# Patient Record
Sex: MALE | Race: WHITE | NOT HISPANIC OR LATINO
[De-identification: names, ages, dates, MRNs, and addresses within clinical notes are randomized per-mention and may not be internally consistent; named-entity substitution may affect disease eponyms.]

---

## 2017-05-16 ENCOUNTER — APPOINTMENT (OUTPATIENT)
Dept: NEPHROLOGY | Facility: CLINIC | Age: 72
End: 2017-05-16

## 2017-05-16 ENCOUNTER — APPOINTMENT (OUTPATIENT)
Dept: TRANSPLANT | Facility: CLINIC | Age: 72
End: 2017-05-16

## 2017-05-16 VITALS
HEIGHT: 71 IN | BODY MASS INDEX: 29.4 KG/M2 | SYSTOLIC BLOOD PRESSURE: 120 MMHG | WEIGHT: 210 LBS | OXYGEN SATURATION: 98 % | TEMPERATURE: 97.6 F | HEART RATE: 72 BPM | DIASTOLIC BLOOD PRESSURE: 78 MMHG | RESPIRATION RATE: 17 BRPM

## 2017-05-16 DIAGNOSIS — N20.0 CALCULUS OF KIDNEY: ICD-10-CM

## 2017-05-16 DIAGNOSIS — Z83.1 FAMILY HISTORY OF OTHER INFECTIOUS AND PARASITIC DISEASES: ICD-10-CM

## 2017-05-16 DIAGNOSIS — Z86.39 PERSONAL HISTORY OF OTHER ENDOCRINE, NUTRITIONAL AND METABOLIC DISEASE: ICD-10-CM

## 2017-05-16 DIAGNOSIS — Z98.890 OTHER SPECIFIED POSTPROCEDURAL STATES: ICD-10-CM

## 2017-05-16 DIAGNOSIS — Z87.19 PERSONAL HISTORY OF OTHER DISEASES OF THE DIGESTIVE SYSTEM: ICD-10-CM

## 2017-05-16 DIAGNOSIS — N18.9 CHRONIC KIDNEY DISEASE, UNSPECIFIED: ICD-10-CM

## 2017-05-16 DIAGNOSIS — Z90.49 ACQUIRED ABSENCE OF OTHER SPECIFIED PARTS OF DIGESTIVE TRACT: ICD-10-CM

## 2017-05-16 DIAGNOSIS — Z82.49 FAMILY HISTORY OF ISCHEMIC HEART DISEASE AND OTHER DISEASES OF THE CIRCULATORY SYSTEM: ICD-10-CM

## 2017-05-19 ENCOUNTER — APPOINTMENT (OUTPATIENT)
Dept: TRANSPLANT | Facility: CLINIC | Age: 72
End: 2017-05-19

## 2017-05-29 ENCOUNTER — FORM ENCOUNTER (OUTPATIENT)
Age: 72
End: 2017-05-29

## 2017-05-30 ENCOUNTER — APPOINTMENT (OUTPATIENT)
Dept: RADIOLOGY | Facility: CLINIC | Age: 72
End: 2017-05-30

## 2017-05-30 ENCOUNTER — OUTPATIENT (OUTPATIENT)
Dept: OUTPATIENT SERVICES | Facility: HOSPITAL | Age: 72
LOS: 1 days | End: 2017-05-30
Payer: COMMERCIAL

## 2017-05-30 ENCOUNTER — APPOINTMENT (OUTPATIENT)
Dept: ULTRASOUND IMAGING | Facility: CLINIC | Age: 72
End: 2017-05-30

## 2017-05-30 DIAGNOSIS — Z00.8 ENCOUNTER FOR OTHER GENERAL EXAMINATION: ICD-10-CM

## 2017-05-30 DIAGNOSIS — Z01.818 ENCOUNTER FOR OTHER PREPROCEDURAL EXAMINATION: ICD-10-CM

## 2017-05-30 PROCEDURE — 93975 VASCULAR STUDY: CPT

## 2017-05-30 PROCEDURE — 71046 X-RAY EXAM CHEST 2 VIEWS: CPT

## 2017-06-08 ENCOUNTER — NON-APPOINTMENT (OUTPATIENT)
Age: 72
End: 2017-06-08

## 2017-06-08 ENCOUNTER — APPOINTMENT (OUTPATIENT)
Dept: CARDIOLOGY | Facility: CLINIC | Age: 72
End: 2017-06-08

## 2017-06-08 VITALS
HEART RATE: 66 BPM | SYSTOLIC BLOOD PRESSURE: 119 MMHG | WEIGHT: 212 LBS | DIASTOLIC BLOOD PRESSURE: 82 MMHG | OXYGEN SATURATION: 96 % | BODY MASS INDEX: 29.68 KG/M2 | HEIGHT: 71 IN

## 2017-06-08 DIAGNOSIS — H35.30 UNSPECIFIED MACULAR DEGENERATION: ICD-10-CM

## 2017-06-08 DIAGNOSIS — Z87.891 PERSONAL HISTORY OF NICOTINE DEPENDENCE: ICD-10-CM

## 2017-06-21 ENCOUNTER — OUTPATIENT (OUTPATIENT)
Dept: OUTPATIENT SERVICES | Facility: HOSPITAL | Age: 72
LOS: 1 days | End: 2017-06-21
Payer: COMMERCIAL

## 2017-06-21 DIAGNOSIS — I10 ESSENTIAL (PRIMARY) HYPERTENSION: ICD-10-CM

## 2017-06-21 PROCEDURE — 93018 CV STRESS TEST I&R ONLY: CPT

## 2017-06-21 PROCEDURE — 93306 TTE W/DOPPLER COMPLETE: CPT | Mod: 26

## 2017-06-21 PROCEDURE — 93017 CV STRESS TEST TRACING ONLY: CPT

## 2017-06-21 PROCEDURE — 93016 CV STRESS TEST SUPVJ ONLY: CPT

## 2017-06-21 PROCEDURE — A9500: CPT

## 2017-06-21 PROCEDURE — 78452 HT MUSCLE IMAGE SPECT MULT: CPT

## 2017-06-21 PROCEDURE — 93306 TTE W/DOPPLER COMPLETE: CPT

## 2017-06-21 PROCEDURE — 78452 HT MUSCLE IMAGE SPECT MULT: CPT | Mod: 26

## 2017-08-08 ENCOUNTER — APPOINTMENT (OUTPATIENT)
Dept: NEPHROLOGY | Facility: CLINIC | Age: 72
End: 2017-08-08

## 2017-08-24 ENCOUNTER — OTHER (OUTPATIENT)
Age: 72
End: 2017-08-24

## 2017-09-25 ENCOUNTER — CHART COPY (OUTPATIENT)
Age: 72
End: 2017-09-25

## 2017-11-09 ENCOUNTER — CHART COPY (OUTPATIENT)
Age: 72
End: 2017-11-09

## 2018-01-02 ENCOUNTER — APPOINTMENT (OUTPATIENT)
Dept: TRANSPLANT | Facility: CLINIC | Age: 73
End: 2018-01-02
Payer: COMMERCIAL

## 2018-01-02 VITALS
HEART RATE: 79 BPM | DIASTOLIC BLOOD PRESSURE: 72 MMHG | TEMPERATURE: 97.5 F | SYSTOLIC BLOOD PRESSURE: 113 MMHG | WEIGHT: 220 LBS | BODY MASS INDEX: 30.8 KG/M2 | OXYGEN SATURATION: 96 % | HEIGHT: 71 IN | RESPIRATION RATE: 17 BRPM

## 2018-01-02 PROCEDURE — XXXXX: CPT

## 2018-11-06 ENCOUNTER — APPOINTMENT (OUTPATIENT)
Dept: TRANSPLANT | Facility: CLINIC | Age: 73
End: 2018-11-06

## 2019-01-22 ENCOUNTER — APPOINTMENT (OUTPATIENT)
Dept: TRANSPLANT | Facility: CLINIC | Age: 74
End: 2019-01-22

## 2019-01-22 ENCOUNTER — APPOINTMENT (OUTPATIENT)
Dept: NEPHROLOGY | Facility: CLINIC | Age: 74
End: 2019-01-22

## 2019-01-31 ENCOUNTER — TRANSCRIPTION ENCOUNTER (OUTPATIENT)
Age: 74
End: 2019-01-31

## 2019-01-31 ENCOUNTER — APPOINTMENT (OUTPATIENT)
Dept: TRANSPLANT | Facility: CLINIC | Age: 74
End: 2019-01-31
Payer: COMMERCIAL

## 2019-01-31 VITALS
WEIGHT: 244 LBS | SYSTOLIC BLOOD PRESSURE: 155 MMHG | DIASTOLIC BLOOD PRESSURE: 82 MMHG | OXYGEN SATURATION: 92 % | HEIGHT: 71 IN | RESPIRATION RATE: 17 BRPM | TEMPERATURE: 98.2 F | BODY MASS INDEX: 34.16 KG/M2 | HEART RATE: 104 BPM

## 2019-01-31 PROCEDURE — 99214 OFFICE O/P EST MOD 30 MIN: CPT | Mod: 57

## 2019-02-01 ENCOUNTER — INPATIENT (INPATIENT)
Facility: HOSPITAL | Age: 74
LOS: 4 days | Discharge: HOME | DRG: 652 | End: 2019-02-06
Attending: TRANSPLANT SURGERY | Admitting: TRANSPLANT SURGERY
Payer: MEDICARE

## 2019-02-01 ENCOUNTER — APPOINTMENT (OUTPATIENT)
Dept: TRANSPLANT | Facility: HOSPITAL | Age: 74
End: 2019-02-01

## 2019-02-01 VITALS
SYSTOLIC BLOOD PRESSURE: 118 MMHG | TEMPERATURE: 98 F | OXYGEN SATURATION: 94 % | DIASTOLIC BLOOD PRESSURE: 76 MMHG | HEIGHT: 71 IN | HEART RATE: 87 BPM | WEIGHT: 246.04 LBS | RESPIRATION RATE: 16 BRPM

## 2019-02-01 DIAGNOSIS — D89.9 DISORDER INVOLVING THE IMMUNE MECHANISM, UNSPECIFIED: ICD-10-CM

## 2019-02-01 DIAGNOSIS — Z90.49 ACQUIRED ABSENCE OF OTHER SPECIFIED PARTS OF DIGESTIVE TRACT: Chronic | ICD-10-CM

## 2019-02-01 DIAGNOSIS — E11.9 TYPE 2 DIABETES MELLITUS WITHOUT COMPLICATIONS: ICD-10-CM

## 2019-02-01 DIAGNOSIS — Z94.0 KIDNEY TRANSPLANT STATUS: ICD-10-CM

## 2019-02-01 DIAGNOSIS — Z90.3 ACQUIRED ABSENCE OF STOMACH [PART OF]: Chronic | ICD-10-CM

## 2019-02-01 DIAGNOSIS — Z98.84 BARIATRIC SURGERY STATUS: Chronic | ICD-10-CM

## 2019-02-01 DIAGNOSIS — N18.9 CHRONIC KIDNEY DISEASE, UNSPECIFIED: ICD-10-CM

## 2019-02-01 LAB
ALBUMIN SERPL ELPH-MCNC: 3.4 G/DL — SIGNIFICANT CHANGE UP (ref 3.3–5)
ALBUMIN SERPL ELPH-MCNC: 3.9 G/DL — SIGNIFICANT CHANGE UP (ref 3.3–5)
ALP SERPL-CCNC: 103 U/L — SIGNIFICANT CHANGE UP (ref 40–120)
ALP SERPL-CCNC: 91 U/L — SIGNIFICANT CHANGE UP (ref 40–120)
ALT FLD-CCNC: 17 U/L — SIGNIFICANT CHANGE UP (ref 10–45)
ALT FLD-CCNC: 19 U/L — SIGNIFICANT CHANGE UP (ref 10–45)
ANION GAP SERPL CALC-SCNC: 12 MMOL/L — SIGNIFICANT CHANGE UP (ref 5–17)
ANION GAP SERPL CALC-SCNC: 14 MMOL/L — SIGNIFICANT CHANGE UP (ref 5–17)
ANION GAP SERPL CALC-SCNC: 17 MMOL/L — SIGNIFICANT CHANGE UP (ref 5–17)
APTT BLD: 29.3 SEC — SIGNIFICANT CHANGE UP (ref 27.5–36.3)
APTT BLD: 31.3 SEC — SIGNIFICANT CHANGE UP (ref 27.5–36.3)
AST SERPL-CCNC: 15 U/L — SIGNIFICANT CHANGE UP (ref 10–40)
AST SERPL-CCNC: 22 U/L — SIGNIFICANT CHANGE UP (ref 10–40)
BASOPHILS # BLD AUTO: 0 K/UL — SIGNIFICANT CHANGE UP (ref 0–0.2)
BASOPHILS # BLD AUTO: 0 K/UL — SIGNIFICANT CHANGE UP (ref 0–0.2)
BASOPHILS NFR BLD AUTO: 0 % — SIGNIFICANT CHANGE UP (ref 0–2)
BASOPHILS NFR BLD AUTO: 0.2 % — SIGNIFICANT CHANGE UP (ref 0–2)
BILIRUB SERPL-MCNC: 0.3 MG/DL — SIGNIFICANT CHANGE UP (ref 0.2–1.2)
BILIRUB SERPL-MCNC: 0.3 MG/DL — SIGNIFICANT CHANGE UP (ref 0.2–1.2)
BLD GP AB SCN SERPL QL: NEGATIVE — SIGNIFICANT CHANGE UP
BUN SERPL-MCNC: 44 MG/DL — HIGH (ref 7–23)
BUN SERPL-MCNC: 44 MG/DL — HIGH (ref 7–23)
BUN SERPL-MCNC: 47 MG/DL — HIGH (ref 7–23)
CALCIUM SERPL-MCNC: 7.6 MG/DL — LOW (ref 8.4–10.5)
CALCIUM SERPL-MCNC: 7.7 MG/DL — LOW (ref 8.4–10.5)
CALCIUM SERPL-MCNC: 9.1 MG/DL — SIGNIFICANT CHANGE UP (ref 8.4–10.5)
CHLORIDE SERPL-SCNC: 104 MMOL/L — SIGNIFICANT CHANGE UP (ref 96–108)
CHLORIDE SERPL-SCNC: 106 MMOL/L — SIGNIFICANT CHANGE UP (ref 96–108)
CHLORIDE SERPL-SCNC: 106 MMOL/L — SIGNIFICANT CHANGE UP (ref 96–108)
CO2 SERPL-SCNC: 17 MMOL/L — LOW (ref 22–31)
CO2 SERPL-SCNC: 19 MMOL/L — LOW (ref 22–31)
CO2 SERPL-SCNC: 19 MMOL/L — LOW (ref 22–31)
CREAT SERPL-MCNC: 4.52 MG/DL — HIGH (ref 0.5–1.3)
CREAT SERPL-MCNC: 4.56 MG/DL — HIGH (ref 0.5–1.3)
CREAT SERPL-MCNC: 4.76 MG/DL — HIGH (ref 0.5–1.3)
EOSINOPHIL # BLD AUTO: 0 K/UL — SIGNIFICANT CHANGE UP (ref 0–0.5)
EOSINOPHIL # BLD AUTO: 0 K/UL — SIGNIFICANT CHANGE UP (ref 0–0.5)
EOSINOPHIL NFR BLD AUTO: 0.2 % — SIGNIFICANT CHANGE UP (ref 0–6)
EOSINOPHIL NFR BLD AUTO: 0.4 % — SIGNIFICANT CHANGE UP (ref 0–6)
GAS PNL BLDV: SIGNIFICANT CHANGE UP
GLUCOSE SERPL-MCNC: 146 MG/DL — HIGH (ref 70–99)
GLUCOSE SERPL-MCNC: 245 MG/DL — HIGH (ref 70–99)
GLUCOSE SERPL-MCNC: 250 MG/DL — HIGH (ref 70–99)
HCT VFR BLD CALC: 33.6 % — LOW (ref 39–50)
HCT VFR BLD CALC: 34.5 % — LOW (ref 39–50)
HCT VFR BLD CALC: 36.6 % — LOW (ref 39–50)
HGB BLD-MCNC: 11.2 G/DL — LOW (ref 13–17)
HGB BLD-MCNC: 11.7 G/DL — LOW (ref 13–17)
HGB BLD-MCNC: 12.5 G/DL — LOW (ref 13–17)
INR BLD: 0.96 RATIO — SIGNIFICANT CHANGE UP (ref 0.88–1.16)
INR BLD: 1.03 RATIO — SIGNIFICANT CHANGE UP (ref 0.88–1.16)
LYMPHOCYTES # BLD AUTO: 0.4 K/UL — LOW (ref 1–3.3)
LYMPHOCYTES # BLD AUTO: 0.8 K/UL — LOW (ref 1–3.3)
LYMPHOCYTES # BLD AUTO: 10.2 % — LOW (ref 13–44)
LYMPHOCYTES # BLD AUTO: 3.6 % — LOW (ref 13–44)
MAGNESIUM SERPL-MCNC: 2 MG/DL — SIGNIFICANT CHANGE UP (ref 1.6–2.6)
MCHC RBC-ENTMCNC: 31.6 PG — SIGNIFICANT CHANGE UP (ref 27–34)
MCHC RBC-ENTMCNC: 32 PG — SIGNIFICANT CHANGE UP (ref 27–34)
MCHC RBC-ENTMCNC: 32.1 PG — SIGNIFICANT CHANGE UP (ref 27–34)
MCHC RBC-ENTMCNC: 33.4 GM/DL — SIGNIFICANT CHANGE UP (ref 32–36)
MCHC RBC-ENTMCNC: 33.9 GM/DL — SIGNIFICANT CHANGE UP (ref 32–36)
MCHC RBC-ENTMCNC: 34.2 GM/DL — SIGNIFICANT CHANGE UP (ref 32–36)
MCV RBC AUTO: 93.5 FL — SIGNIFICANT CHANGE UP (ref 80–100)
MCV RBC AUTO: 94.5 FL — SIGNIFICANT CHANGE UP (ref 80–100)
MCV RBC AUTO: 94.7 FL — SIGNIFICANT CHANGE UP (ref 80–100)
MONOCYTES # BLD AUTO: 0.2 K/UL — SIGNIFICANT CHANGE UP (ref 0–0.9)
MONOCYTES # BLD AUTO: 0.3 K/UL — SIGNIFICANT CHANGE UP (ref 0–0.9)
MONOCYTES NFR BLD AUTO: 2.5 % — SIGNIFICANT CHANGE UP (ref 2–14)
MONOCYTES NFR BLD AUTO: 2.9 % — SIGNIFICANT CHANGE UP (ref 2–14)
NEUTROPHILS # BLD AUTO: 11 K/UL — HIGH (ref 1.8–7.4)
NEUTROPHILS # BLD AUTO: 6.9 K/UL — SIGNIFICANT CHANGE UP (ref 1.8–7.4)
NEUTROPHILS NFR BLD AUTO: 86.8 % — HIGH (ref 43–77)
NEUTROPHILS NFR BLD AUTO: 93.3 % — HIGH (ref 43–77)
PHOSPHATE SERPL-MCNC: 4.2 MG/DL — SIGNIFICANT CHANGE UP (ref 2.5–4.5)
PLATELET # BLD AUTO: 194 K/UL — SIGNIFICANT CHANGE UP (ref 150–400)
PLATELET # BLD AUTO: 199 K/UL — SIGNIFICANT CHANGE UP (ref 150–400)
PLATELET # BLD AUTO: 230 K/UL — SIGNIFICANT CHANGE UP (ref 150–400)
POTASSIUM SERPL-MCNC: 3.7 MMOL/L — SIGNIFICANT CHANGE UP (ref 3.5–5.3)
POTASSIUM SERPL-MCNC: 3.8 MMOL/L — SIGNIFICANT CHANGE UP (ref 3.5–5.3)
POTASSIUM SERPL-MCNC: 5 MMOL/L — SIGNIFICANT CHANGE UP (ref 3.5–5.3)
POTASSIUM SERPL-SCNC: 3.7 MMOL/L — SIGNIFICANT CHANGE UP (ref 3.5–5.3)
POTASSIUM SERPL-SCNC: 3.8 MMOL/L — SIGNIFICANT CHANGE UP (ref 3.5–5.3)
POTASSIUM SERPL-SCNC: 5 MMOL/L — SIGNIFICANT CHANGE UP (ref 3.5–5.3)
PROT SERPL-MCNC: 7.1 G/DL — SIGNIFICANT CHANGE UP (ref 6–8.3)
PROT SERPL-MCNC: 8.2 G/DL — SIGNIFICANT CHANGE UP (ref 6–8.3)
PROTHROM AB SERPL-ACNC: 10.9 SEC — SIGNIFICANT CHANGE UP (ref 10–12.9)
PROTHROM AB SERPL-ACNC: 11.7 SEC — SIGNIFICANT CHANGE UP (ref 10–12.9)
RBC # BLD: 3.56 M/UL — LOW (ref 4.2–5.8)
RBC # BLD: 3.65 M/UL — LOW (ref 4.2–5.8)
RBC # BLD: 3.91 M/UL — LOW (ref 4.2–5.8)
RBC # FLD: 12.3 % — SIGNIFICANT CHANGE UP (ref 10.3–14.5)
RBC # FLD: 12.4 % — SIGNIFICANT CHANGE UP (ref 10.3–14.5)
RBC # FLD: 12.5 % — SIGNIFICANT CHANGE UP (ref 10.3–14.5)
RH IG SCN BLD-IMP: POSITIVE — SIGNIFICANT CHANGE UP
SODIUM SERPL-SCNC: 137 MMOL/L — SIGNIFICANT CHANGE UP (ref 135–145)
SODIUM SERPL-SCNC: 137 MMOL/L — SIGNIFICANT CHANGE UP (ref 135–145)
SODIUM SERPL-SCNC: 140 MMOL/L — SIGNIFICANT CHANGE UP (ref 135–145)
WBC # BLD: 11.7 K/UL — HIGH (ref 3.8–10.5)
WBC # BLD: 6.6 K/UL — SIGNIFICANT CHANGE UP (ref 3.8–10.5)
WBC # BLD: 7.9 K/UL — SIGNIFICANT CHANGE UP (ref 3.8–10.5)
WBC # FLD AUTO: 11.7 K/UL — HIGH (ref 3.8–10.5)
WBC # FLD AUTO: 6.6 K/UL — SIGNIFICANT CHANGE UP (ref 3.8–10.5)
WBC # FLD AUTO: 7.9 K/UL — SIGNIFICANT CHANGE UP (ref 3.8–10.5)

## 2019-02-01 PROCEDURE — 50605 INSERT URETERAL SUPPORT: CPT | Mod: GC

## 2019-02-01 PROCEDURE — 93010 ELECTROCARDIOGRAM REPORT: CPT | Mod: 76

## 2019-02-01 PROCEDURE — 71045 X-RAY EXAM CHEST 1 VIEW: CPT | Mod: 26

## 2019-02-01 PROCEDURE — 76998 US GUIDE INTRAOP: CPT | Mod: 26,GC

## 2019-02-01 PROCEDURE — 50360 RNL ALTRNSPLJ W/O RCP NFRCT: CPT | Mod: GC

## 2019-02-01 PROCEDURE — 76776 US EXAM K TRANSPL W/DOPPLER: CPT | Mod: 26,RT

## 2019-02-01 PROCEDURE — 74176 CT ABD & PELVIS W/O CONTRAST: CPT | Mod: 26

## 2019-02-01 RX ORDER — MYCOPHENOLATE MOFETIL 250 MG/1
1 CAPSULE ORAL EVERY 12 HOURS
Qty: 0 | Refills: 0 | Status: DISCONTINUED | OUTPATIENT
Start: 2019-02-02 | End: 2019-02-06

## 2019-02-01 RX ORDER — DEXTROSE 50 % IN WATER 50 %
12.5 SYRINGE (ML) INTRAVENOUS ONCE
Qty: 0 | Refills: 0 | Status: DISCONTINUED | OUTPATIENT
Start: 2019-02-01 | End: 2019-02-01

## 2019-02-01 RX ORDER — SODIUM CHLORIDE 9 MG/ML
1000 INJECTION INTRAMUSCULAR; INTRAVENOUS; SUBCUTANEOUS
Qty: 0 | Refills: 0 | Status: DISCONTINUED | OUTPATIENT
Start: 2019-02-01 | End: 2019-02-02

## 2019-02-01 RX ORDER — HYDROMORPHONE HYDROCHLORIDE 2 MG/ML
0.25 INJECTION INTRAMUSCULAR; INTRAVENOUS; SUBCUTANEOUS
Qty: 0 | Refills: 0 | Status: DISCONTINUED | OUTPATIENT
Start: 2019-02-01 | End: 2019-02-01

## 2019-02-01 RX ORDER — SODIUM CHLORIDE 9 MG/ML
1000 INJECTION, SOLUTION INTRAVENOUS
Qty: 0 | Refills: 0 | Status: DISCONTINUED | OUTPATIENT
Start: 2019-02-01 | End: 2019-02-01

## 2019-02-01 RX ORDER — FAMOTIDINE 10 MG/ML
20 INJECTION INTRAVENOUS DAILY
Qty: 0 | Refills: 0 | Status: DISCONTINUED | OUTPATIENT
Start: 2019-02-02 | End: 2019-02-06

## 2019-02-01 RX ORDER — INSULIN LISPRO 100/ML
VIAL (ML) SUBCUTANEOUS EVERY 6 HOURS
Qty: 0 | Refills: 0 | Status: DISCONTINUED | OUTPATIENT
Start: 2019-02-01 | End: 2019-02-01

## 2019-02-01 RX ORDER — ONDANSETRON 8 MG/1
4 TABLET, FILM COATED ORAL EVERY 6 HOURS
Qty: 0 | Refills: 0 | Status: DISCONTINUED | OUTPATIENT
Start: 2019-02-01 | End: 2019-02-06

## 2019-02-01 RX ORDER — BASILIXIMAB 20 MG/5ML
20 INJECTION, POWDER, FOR SOLUTION INTRAVENOUS ONCE
Qty: 0 | Refills: 0 | Status: DISCONTINUED | OUTPATIENT
Start: 2019-02-01 | End: 2019-02-01

## 2019-02-01 RX ORDER — GLUCAGON INJECTION, SOLUTION 0.5 MG/.1ML
1 INJECTION, SOLUTION SUBCUTANEOUS ONCE
Qty: 0 | Refills: 0 | Status: DISCONTINUED | OUTPATIENT
Start: 2019-02-01 | End: 2019-02-01

## 2019-02-01 RX ORDER — BASILIXIMAB 20 MG/5ML
20 INJECTION, POWDER, FOR SOLUTION INTRAVENOUS ONCE
Qty: 0 | Refills: 0 | Status: COMPLETED | OUTPATIENT
Start: 2019-02-05 | End: 2019-02-05

## 2019-02-01 RX ORDER — CEFAZOLIN SODIUM 1 G
2000 VIAL (EA) INJECTION ONCE
Qty: 0 | Refills: 0 | Status: COMPLETED | OUTPATIENT
Start: 2019-02-01 | End: 2019-02-01

## 2019-02-01 RX ORDER — DEXTROSE 50 % IN WATER 50 %
25 SYRINGE (ML) INTRAVENOUS ONCE
Qty: 0 | Refills: 0 | Status: DISCONTINUED | OUTPATIENT
Start: 2019-02-01 | End: 2019-02-06

## 2019-02-01 RX ORDER — HYDROMORPHONE HYDROCHLORIDE 2 MG/ML
0.5 INJECTION INTRAMUSCULAR; INTRAVENOUS; SUBCUTANEOUS ONCE
Qty: 0 | Refills: 0 | Status: DISCONTINUED | OUTPATIENT
Start: 2019-02-01 | End: 2019-02-03

## 2019-02-01 RX ORDER — HYDROMORPHONE HYDROCHLORIDE 2 MG/ML
30 INJECTION INTRAMUSCULAR; INTRAVENOUS; SUBCUTANEOUS
Qty: 0 | Refills: 0 | Status: DISCONTINUED | OUTPATIENT
Start: 2019-02-01 | End: 2019-02-03

## 2019-02-01 RX ORDER — INSULIN LISPRO 100/ML
VIAL (ML) SUBCUTANEOUS
Qty: 0 | Refills: 0 | Status: DISCONTINUED | OUTPATIENT
Start: 2019-02-01 | End: 2019-02-06

## 2019-02-01 RX ORDER — HYDROMORPHONE HYDROCHLORIDE 2 MG/ML
0.5 INJECTION INTRAMUSCULAR; INTRAVENOUS; SUBCUTANEOUS
Qty: 0 | Refills: 0 | Status: DISCONTINUED | OUTPATIENT
Start: 2019-02-01 | End: 2019-02-03

## 2019-02-01 RX ORDER — CEFTRIAXONE 500 MG/1
1 INJECTION, POWDER, FOR SOLUTION INTRAMUSCULAR; INTRAVENOUS EVERY 24 HOURS
Qty: 0 | Refills: 0 | Status: DISCONTINUED | OUTPATIENT
Start: 2019-02-02 | End: 2019-02-03

## 2019-02-01 RX ORDER — CEFTRIAXONE 500 MG/1
1 INJECTION, POWDER, FOR SOLUTION INTRAMUSCULAR; INTRAVENOUS ONCE
Qty: 0 | Refills: 0 | Status: COMPLETED | OUTPATIENT
Start: 2019-02-01 | End: 2019-02-01

## 2019-02-01 RX ORDER — LEVOTHYROXINE SODIUM 125 MCG
200 TABLET ORAL DAILY
Qty: 0 | Refills: 0 | Status: DISCONTINUED | OUTPATIENT
Start: 2019-02-01 | End: 2019-02-01

## 2019-02-01 RX ORDER — NALOXONE HYDROCHLORIDE 4 MG/.1ML
0.1 SPRAY NASAL
Qty: 0 | Refills: 0 | Status: DISCONTINUED | OUTPATIENT
Start: 2019-02-01 | End: 2019-02-03

## 2019-02-01 RX ORDER — DEXTROSE 50 % IN WATER 50 %
15 SYRINGE (ML) INTRAVENOUS ONCE
Qty: 0 | Refills: 0 | Status: DISCONTINUED | OUTPATIENT
Start: 2019-02-01 | End: 2019-02-01

## 2019-02-01 RX ORDER — DEXTROSE 50 % IN WATER 50 %
12.5 SYRINGE (ML) INTRAVENOUS ONCE
Qty: 0 | Refills: 0 | Status: DISCONTINUED | OUTPATIENT
Start: 2019-02-01 | End: 2019-02-06

## 2019-02-01 RX ORDER — HYDROMORPHONE HYDROCHLORIDE 2 MG/ML
0.25 INJECTION INTRAMUSCULAR; INTRAVENOUS; SUBCUTANEOUS
Qty: 0 | Refills: 0 | Status: DISCONTINUED | OUTPATIENT
Start: 2019-02-01 | End: 2019-02-03

## 2019-02-01 RX ORDER — NYSTATIN 500MM UNIT
500000 POWDER (EA) MISCELLANEOUS
Qty: 0 | Refills: 0 | Status: DISCONTINUED | OUTPATIENT
Start: 2019-02-02 | End: 2019-02-06

## 2019-02-01 RX ORDER — TACROLIMUS 5 MG/1
2 CAPSULE ORAL EVERY 12 HOURS
Qty: 0 | Refills: 0 | Status: DISCONTINUED | OUTPATIENT
Start: 2019-02-01 | End: 2019-02-03

## 2019-02-01 RX ORDER — HYDROMORPHONE HYDROCHLORIDE 2 MG/ML
0.5 INJECTION INTRAMUSCULAR; INTRAVENOUS; SUBCUTANEOUS ONCE
Qty: 0 | Refills: 0 | Status: DISCONTINUED | OUTPATIENT
Start: 2019-02-01 | End: 2019-02-01

## 2019-02-01 RX ORDER — VALGANCICLOVIR 450 MG/1
450 TABLET, FILM COATED ORAL DAILY
Qty: 0 | Refills: 0 | Status: DISCONTINUED | OUTPATIENT
Start: 2019-02-02 | End: 2019-02-03

## 2019-02-01 RX ORDER — DEXTROSE 50 % IN WATER 50 %
25 SYRINGE (ML) INTRAVENOUS ONCE
Qty: 0 | Refills: 0 | Status: DISCONTINUED | OUTPATIENT
Start: 2019-02-01 | End: 2019-02-01

## 2019-02-01 RX ORDER — GLUCAGON INJECTION, SOLUTION 0.5 MG/.1ML
1 INJECTION, SOLUTION SUBCUTANEOUS ONCE
Qty: 0 | Refills: 0 | Status: DISCONTINUED | OUTPATIENT
Start: 2019-02-01 | End: 2019-02-06

## 2019-02-01 RX ORDER — SODIUM CHLORIDE 9 MG/ML
1000 INJECTION, SOLUTION INTRAVENOUS
Qty: 0 | Refills: 0 | Status: DISCONTINUED | OUTPATIENT
Start: 2019-02-01 | End: 2019-02-06

## 2019-02-01 RX ORDER — CEFTRIAXONE 500 MG/1
INJECTION, POWDER, FOR SOLUTION INTRAMUSCULAR; INTRAVENOUS
Qty: 0 | Refills: 0 | Status: DISCONTINUED | OUTPATIENT
Start: 2019-02-01 | End: 2019-02-03

## 2019-02-01 RX ORDER — DEXTROSE 50 % IN WATER 50 %
15 SYRINGE (ML) INTRAVENOUS ONCE
Qty: 0 | Refills: 0 | Status: DISCONTINUED | OUTPATIENT
Start: 2019-02-01 | End: 2019-02-06

## 2019-02-01 RX ADMIN — HYDROMORPHONE HYDROCHLORIDE 0.5 MILLIGRAM(S): 2 INJECTION INTRAMUSCULAR; INTRAVENOUS; SUBCUTANEOUS at 13:11

## 2019-02-01 RX ADMIN — HYDROMORPHONE HYDROCHLORIDE 0.5 MILLIGRAM(S): 2 INJECTION INTRAMUSCULAR; INTRAVENOUS; SUBCUTANEOUS at 12:54

## 2019-02-01 RX ADMIN — TACROLIMUS 2 MILLIGRAM(S): 5 CAPSULE ORAL at 18:47

## 2019-02-01 RX ADMIN — HYDROMORPHONE HYDROCHLORIDE 0.5 MILLIGRAM(S): 2 INJECTION INTRAMUSCULAR; INTRAVENOUS; SUBCUTANEOUS at 13:06

## 2019-02-01 RX ADMIN — HYDROMORPHONE HYDROCHLORIDE 0.5 MILLIGRAM(S): 2 INJECTION INTRAMUSCULAR; INTRAVENOUS; SUBCUTANEOUS at 13:30

## 2019-02-01 RX ADMIN — Medication 500000 UNIT(S): at 23:03

## 2019-02-01 RX ADMIN — HYDROMORPHONE HYDROCHLORIDE 30 MILLILITER(S): 2 INJECTION INTRAMUSCULAR; INTRAVENOUS; SUBCUTANEOUS at 20:30

## 2019-02-01 RX ADMIN — CEFTRIAXONE 100 GRAM(S): 500 INJECTION, POWDER, FOR SOLUTION INTRAMUSCULAR; INTRAVENOUS at 21:03

## 2019-02-01 RX ADMIN — Medication 1: at 21:22

## 2019-02-01 RX ADMIN — HYDROMORPHONE HYDROCHLORIDE 0.5 MILLIGRAM(S): 2 INJECTION INTRAMUSCULAR; INTRAVENOUS; SUBCUTANEOUS at 13:05

## 2019-02-01 RX ADMIN — HYDROMORPHONE HYDROCHLORIDE 30 MILLILITER(S): 2 INJECTION INTRAMUSCULAR; INTRAVENOUS; SUBCUTANEOUS at 20:05

## 2019-02-01 RX ADMIN — HYDROMORPHONE HYDROCHLORIDE 30 MILLILITER(S): 2 INJECTION INTRAMUSCULAR; INTRAVENOUS; SUBCUTANEOUS at 16:31

## 2019-02-01 RX ADMIN — Medication 100 MILLIGRAM(S): at 05:54

## 2019-02-01 RX ADMIN — HYDROMORPHONE HYDROCHLORIDE 0.25 MILLIGRAM(S): 2 INJECTION INTRAMUSCULAR; INTRAVENOUS; SUBCUTANEOUS at 12:41

## 2019-02-01 RX ADMIN — HYDROMORPHONE HYDROCHLORIDE 0.25 MILLIGRAM(S): 2 INJECTION INTRAMUSCULAR; INTRAVENOUS; SUBCUTANEOUS at 12:50

## 2019-02-01 RX ADMIN — SODIUM CHLORIDE 70 MILLILITER(S): 9 INJECTION INTRAMUSCULAR; INTRAVENOUS; SUBCUTANEOUS at 12:00

## 2019-02-01 RX ADMIN — Medication 3: at 16:05

## 2019-02-01 RX ADMIN — HYDROMORPHONE HYDROCHLORIDE 0.25 MILLIGRAM(S): 2 INJECTION INTRAMUSCULAR; INTRAVENOUS; SUBCUTANEOUS at 12:32

## 2019-02-01 NOTE — H&P ADULT - ASSESSMENT
73yM w/ ESRD on PD    - Admit to Transplant surgery, Dr. Herbert  - OR for DDRT  - CT noncon abd/pelvis to assess iliac vessels  - F/U preop labs  - HD if electrolyte abnormalities  - NPO  - Pt discussed w/ Dr. Herbert  - Please page [phone] w/ any questions    LARRY Arita PGY-3

## 2019-02-01 NOTE — H&P ADULT - PSH
History of cholecystectomy    History of partial gastrectomy    History of Porsha-en-Y gastric bypass

## 2019-02-01 NOTE — CONSULT NOTE ADULT - ATTENDING COMMENTS
Pt POD 0 s/p DDRT  ABELARDO of kidney due to ATN  Consented for dialysis  Monitor electrolytes.  Insulin ss for history of DM  BP stable

## 2019-02-01 NOTE — PROGRESS NOTE ADULT - SUBJECTIVE AND OBJECTIVE BOX
STATUS POST:   Decreased Donor Renal transplant - Right    POD#:  0    INTERVAL EVENTS:  Patient has pain at incision site.  Denies any CP or SOB.    SUBJECTIVE: Pt seen + examined  SOB:  [] YES [x] NO  Dyspnea: []YES [x]NO  Chest Discomfort: [] YES [x] NO    Nausea: [] YES [x] NO           Vomiting: [] YES [x] NO  Flatus: [] YES [x] NO             Bowel Movement: [] YES [x] NO  Diarrhea: [] YES [x] NO         Constipation: [] YES [x] NO     Pain Control Adequate: [x] YES [] NO  Nguyen: Nguyen in place    VITALS  T(C): 36.6 (02-01-19 @ 19:00), Max: 36.6 (02-01-19 @ 04:26)  HR: 92 (02-01-19 @ 19:30) (79 - 98)  BP: 131/88 (02-01-19 @ 19:30) (118/76 - 151/72)  RR: 18 (02-01-19 @ 19:30) (14 - 20)  SpO2: 100% (02-01-19 @ 19:30) (92% - 100%)  CAPILLARY BLOOD GLUCOSE      POCT Blood Glucose.: 251 mg/dL (01 Feb 2019 15:56)  POCT Blood Glucose.: 150 mg/dL (01 Feb 2019 05:58)      Is/Os    01-31 @ 07:01  -  02-01 @ 07:00  --------------------------------------------------------  IN:  Total IN: 0 mL    OUT:  Total OUT: 0 mL    Total NET: 0 mL      02-01 @ 07:01  -  02-01 @ 20:06  --------------------------------------------------------  IN:    Oral Fluid: 100 mL    sodium chloride 0.9%.: 175 mL    sodium chloride 0.9%.: 630 mL  Total IN: 905 mL    OUT:    Indwelling Catheter - Urethral: 690 mL  Total OUT: 690 mL    Total NET: 215 mL          PHYSICAL EXAM:  General: NAD, Lying in bed comfortably  Neuro: alert, oriented x3  HEENT: NC/AT  Cardio: Regular rate  Resp: Good effort, no respiratory distress, no use of accessory muscles  GI/Abd: Soft, appropriately tender at incision.  PD catheter in place.  Vascular: All 4 extremities warm.  Palpable DPs b/l 2+.  Musculoskeletal: All 4 extremities moving spontaneously.    MEDICATIONS (STANDING): cefTRIAXone   IVPB      dextrose 5%. 1000 milliLiter(s) IV Continuous <Continuous>  dextrose 50% Injectable 12.5 Gram(s) IV Push once  dextrose 50% Injectable 25 Gram(s) IV Push once  dextrose 50% Injectable 25 Gram(s) IV Push once  HYDROmorphone  Injectable 0.5 milliGRAM(s) IV Push once  HYDROmorphone PCA (1 mG/mL) 30 milliLiter(s) PCA Continuous PCA Continuous  insulin lispro (HumaLOG) corrective regimen sliding scale   SubCutaneous Before meals and at bedtime  sodium chloride 0.9%. 1000 milliLiter(s) IV Continuous <Continuous>  sodium chloride 0.9%. 1000 milliLiter(s) IV Continuous <Continuous>  tacrolimus 2 milliGRAM(s) Oral every 12 hours    MEDICATIONS (PRN):dextrose 40% Gel 15 Gram(s) Oral once PRN Blood Glucose LESS THAN 70 milliGRAM(s)/deciliter  glucagon  Injectable 1 milliGRAM(s) IntraMuscular once PRN Glucose LESS THAN 70 milligrams/deciliter  HYDROmorphone  Injectable 0.25 milliGRAM(s) IV Push every 10 minutes PRN Moderate Pain (4 - 6)  HYDROmorphone  Injectable 0.5 milliGRAM(s) IV Push every 10 minutes PRN Severe Pain (7 - 10)  HYDROmorphone PCA (1 mG/mL) Rescue Clinician Bolus 0.25 milliGRAM(s) IV Push every 15 minutes PRN for Pain Scale GREATER THAN 6  naloxone Injectable 0.1 milliGRAM(s) IV Push every 3 minutes PRN For ANY of the following changes in patient status:  A. RR LESS THAN 10 breaths per minute, B. Oxygen saturation LESS THAN 90%, C. Sedation score of 6  ondansetron Injectable 4 milliGRAM(s) IV Push every 6 hours PRN Nausea and/or Vomiting      LABS  CBC (02-01 @ 18:05)                              11.7<L>                         11.7<H>  )----------------(  199        93.3<H>% Neutrophils, 3.6<L>% Lymphocytes, ANC: 11.0<H>                              34.5<L>  CBC (02-01 @ 12:38)                              11.2<L>                         7.9     )----------------(  194        86.8<H>% Neutrophils, 10.2<L>% Lymphocytes, ANC: 6.9                                 33.6<L>    BMP (02-01 @ 18:05)             137     |  106     |  47<H> 		Ca++ --      Ca 7.6<L>             ---------------------------------( 250<H>		Mg --                 5.0     |  17<L>   |  4.76<H>			Ph --      BMP (02-01 @ 12:38)             137     |  106     |  44<H> 		Ca++ --      Ca 7.7<L>             ---------------------------------( 245<H>		Mg 1.8                3.8     |  19<L>   |  4.52<H>			Ph 4.3       LFTs (02-01 @ 12:38)      TPro 7.1 / Alb 3.4 / TBili 0.3 / DBili -- / AST 22 / ALT 17 / AlkPhos 91  LFTs (02-01 @ 04:55)      TPro 8.2 / Alb 3.9 / TBili 0.3 / DBili -- / AST 15 / ALT 19 / AlkPhos 103    Coags (02-01 @ 18:05)  aPTT 31.3 / INR -- / PT --  Coags (02-01 @ 12:38)  aPTT -- / INR 1.03 / PT 11.7        VBG (02-01 @ 05:30)     7.36 / 41 / 70<H> / 23 / -2.1<L> / 92<H>%     Lactate: 1.7      IMAGING STUDIES    ASSESSMENT  73y male with PMH of DM II, HLD, and ESRD s/p Right DDRT postop day 0.    PLAN  - Diet: CLD  - Pain control with PO/IV medications.    - CFTX for positive donor culture.  - SCDs for VTE px.  - Start Cellcept and tacro.  - continue chemical VTE ppx  - Valcyte and bactrim for px coverage  - Methylprednisolone taper  - ISS for glucose control  - Continue IVFs @ 70ml/hr with replacement fluids 1:1 for urine output.  - Nguyen for strict Is and Os.

## 2019-02-01 NOTE — CONSULT NOTE ADULT - PROBLEM SELECTOR RECOMMENDATION 3
Start patient on ISS when needed to control hyperglycemia. Check FSGS ACHS. Diabetic diet when ordered.

## 2019-02-01 NOTE — BRIEF OPERATIVE NOTE - OPERATION/FINDINGS
2 renal arteries anastomosed to iliac via carrel patch, 1 vein, 1 ureter  Rec:   73 M DM, CAD, Hx of MI, Sepsis, hypothyroidism, gastric ulcer, morbid obesity, s/p gastric bypass, kidney stones, fatty pseudotumor of right eye  CPRA: 0%  ABO: O  last HD: PD 22:00 (1/31)    Donor:  [facility] donor incarcerated  COD: Drug overdose, anoxia  Donor ID: [id number]  60 y/o   cross clamp time: 01/31/2019 16:32  CMV - /EBV +  right kidney  ABO: O  KDPI: 81% 2 renal arteries anastomosed to iliac via carrel patch, 1 vein, 1 ureter  Rec:   73 M DM, CAD, Hx of MI, Sepsis, hypothyroidism, gastric ulcer, morbid obesity, s/p gastric bypass, kidney stones, fatty pseudotumor of right eye  CPRA: 0%  ABO: O  last HD: PD 22:00 (1/31)    Donor:  [facility] donor incarcerated  COD: Drug overdose, anoxia  Donor ID: [id number]  58 y/o   cross clamp time: 01/31/2019 16:32 (clamp off at 9:43, total ischemic time 16 hours and 19 minutes of ischemic time)  CMV - /EBV +  right kidney  ABO: O  KDPI: 81%

## 2019-02-01 NOTE — PROVIDER CONTACT NOTE (OTHER) - ACTION/TREATMENT ORDERED:
Continue to monitor for another hour, if output is still low after the next hour will give pt bolus.

## 2019-02-01 NOTE — H&P ADULT - PMH
DM2 (diabetes mellitus, type 2)    ESRD on peritoneal dialysis    HLD (hyperlipidemia)    Hypothyroidism    Morbid obesity

## 2019-02-01 NOTE — H&P ADULT - ATTENDING COMMENTS
VICKI COLEMAN was seen and evaluated this morning.  As documented, Mr Coleman is a 73y Male with DM causing end-stage renal disease.  A  donor organ has been made available to him, and he has agreed to proceed with renal transplantation. He has had no major illnesses or hospitalizations recently.    We discussed the risks and benefits of kidney transplantation in depth.  Surgical risks included but were not limited to bleeding, infection, graft thrombosis, delayed graft function, urine leak, and potential need for re operation postoperatively.  We also discussed the risks of postoperative immunosuppression including but not limited to increased risk of infection, cancer, weight gain, new onset or worsening of diabetes or hypertension on a temporary or permanent basis, water retention, back pain, constipation, diarrhea, dizziness, headache, joint pain, loss of appetite, nausea, stomach pain or upset, trouble sleeping, vomiting, and/or mental or mood changes were fully disclosed. VICKI COLEMAN understands these risks and is willing to proceed with kidney transplantation. We discussed the differences in quality of life and patient survival between remaining on dialysis versus receiving a kidney transplant.      VICKI COLEMAN is aware that this organ is deemed PHS increased risk, and as such there is a risk that though the donor has negative serology and nucleic acid testing for HIV, HBV or HCV, the donor may be within the window period for infection.  The patient was advised that this risk of this is <0.1% for HIV and 0.3% for HCV. VICKI COLEMAN signed a consent and agreed to proceed.

## 2019-02-01 NOTE — CONSULT NOTE ADULT - ASSESSMENT
Date & Time: 1/20/2025, 4:58 PM  Patient: Tiara Baxter  Encounter Provider(s):    Libertad Rivas APRN       To Whom It May Concern:    Tiara Baxter was seen and treated in our department on 1/20/2025. She should not return to work until 01/23/2025 .    If you have any questions or concerns, please do not hesitate to call.        _____________________________  Physician/APC Signature           
73 yo M with ESRD on PD, hx of morbid obesity s/p RNYGB, DM2, and CAD/MI is admitted s/p directed DDRT on 2/1/19. Nephrology consulted for management of immunosuppression.

## 2019-02-01 NOTE — H&P ADULT - NSHPPHYSICALEXAM_GEN_ALL_CORE
Gen: WD, WN, NAD  HEENT: PERRLA, EOMI, Oropharynx clear  Neck: Supple, no JVD/Bruit, No thyromegaly  Lungs: CTA B/L  Heart: RRR, S1 S2, No m/r/g  Abd: Soft, Distended w/ PD fluid, NT, No HSM, No rebound or guarding  Ext: WWP, No clubbing, cyanosis, or edema  Vasc: Palp DP b/l  Neuro: AAOx3, CN II-XII grossly intact, No focal deficits

## 2019-02-01 NOTE — PROGRESS NOTE ADULT - SUBJECTIVE AND OBJECTIVE BOX
73y M admitted for a  donor kidney transplant on 19    Outpatient medication reconciliation reviewed and will be re-started appropriately.  Plan discussed with multidisciplinary team.     Continue current immunosuppressive regimen.  Induction:  Simulect 20 mg on post-operative day 0 (intra-operative) and POD4  Methylprednisolone taper (500 mg, 125 mg twice daily, 60 mg twice daily, 30 mg twice daily) then to PO prednisone taper    Maintenance:  Tacrolimus adjusted to trough (8-10 ng/mL)  Mycophenolate mofetil 1000 mg q12h  Prednisone taper starting POD4 (20 mg twice daily, 10 mg twice daily, 5 mg twice daily, 5 mg daily)    Anti-infective prophylaxis:  Bactrim SS daily  Valcyte (adjusted to renal function)  Nystatin swish and swallow four times daily     GI/DVT ppx:  Famotidine 20 mg daily

## 2019-02-01 NOTE — H&P ADULT - HISTORY OF PRESENT ILLNESS
73yM w/PMH sig for ESRD on PD, HLD, DM2, Hypothyroidism, h/o morbid obesity s/p RNYGB 2005 [place], h/o bleeding peptic ulcer s/p partial gastrectomy 7/2016. Pt presents to [facility] as a direct admit for DDRT. The patient denies fever, chills; chest pain, SOB, palpitation; dizziness, weakness; nausea, vomiting; diarrhea, constipation; abdominal pain; leg swelling; sick contacts; and recent travel. 73yM w/PMH sig for ESRD on PD, HLD, DM2, Hypothyroidism, h/o morbid obesity s/p RNYGB 2005 [place], h/o bleeding peptic ulcer s/p partial gastrectomy 7/2016. Pt presents to [facility] as a direct admit for DDRT. Pt states he instilled 1 bag of PD fluid at 10PM PTA. Pt continues to make urine. The patient denies fever, chills; chest pain, SOB, palpitation; dizziness, weakness; nausea, vomiting; diarrhea, constipation; abdominal pain; dysuria, hematuria; leg swelling; sick contacts; and recent travel.

## 2019-02-02 DIAGNOSIS — I25.10 ATHEROSCLEROTIC HEART DISEASE OF NATIVE CORONARY ARTERY WITHOUT ANGINA PECTORIS: ICD-10-CM

## 2019-02-02 LAB
ANION GAP SERPL CALC-SCNC: 13 MMOL/L — SIGNIFICANT CHANGE UP (ref 5–17)
APTT BLD: 28.2 SEC — SIGNIFICANT CHANGE UP (ref 27.5–36.3)
BASOPHILS # BLD AUTO: 0 K/UL — SIGNIFICANT CHANGE UP (ref 0–0.2)
BASOPHILS NFR BLD AUTO: 0.1 % — SIGNIFICANT CHANGE UP (ref 0–2)
BUN SERPL-MCNC: 55 MG/DL — HIGH (ref 7–23)
CALCIUM SERPL-MCNC: 7 MG/DL — LOW (ref 8.4–10.5)
CHLORIDE SERPL-SCNC: 106 MMOL/L — SIGNIFICANT CHANGE UP (ref 96–108)
CO2 SERPL-SCNC: 18 MMOL/L — LOW (ref 22–31)
CREAT SERPL-MCNC: 5.08 MG/DL — HIGH (ref 0.5–1.3)
EOSINOPHIL # BLD AUTO: 0 K/UL — SIGNIFICANT CHANGE UP (ref 0–0.5)
EOSINOPHIL NFR BLD AUTO: 0.2 % — SIGNIFICANT CHANGE UP (ref 0–6)
GLUCOSE SERPL-MCNC: 204 MG/DL — HIGH (ref 70–99)
HBA1C BLD-MCNC: 6.2 % — HIGH (ref 4–5.6)
HBV CORE AB SER-ACNC: SIGNIFICANT CHANGE UP
HBV SURFACE AB SER-ACNC: 6.8 MIU/ML — LOW
HBV SURFACE AG SER-ACNC: SIGNIFICANT CHANGE UP
HCT VFR BLD CALC: 32.7 % — LOW (ref 39–50)
HCV AB S/CO SERPL IA: 0.14 S/CO — SIGNIFICANT CHANGE UP
HCV AB SERPL-IMP: SIGNIFICANT CHANGE UP
HGB BLD-MCNC: 10.8 G/DL — LOW (ref 13–17)
INR BLD: 1.03 RATIO — SIGNIFICANT CHANGE UP (ref 0.88–1.16)
LDH SERPL L TO P-CCNC: 211 U/L — SIGNIFICANT CHANGE UP (ref 50–242)
LYMPHOCYTES # BLD AUTO: 0.8 K/UL — LOW (ref 1–3.3)
LYMPHOCYTES # BLD AUTO: 5.7 % — LOW (ref 13–44)
MAGNESIUM SERPL-MCNC: 1.8 MG/DL — SIGNIFICANT CHANGE UP (ref 1.6–2.6)
MCHC RBC-ENTMCNC: 32.3 PG — SIGNIFICANT CHANGE UP (ref 27–34)
MCHC RBC-ENTMCNC: 33.2 GM/DL — SIGNIFICANT CHANGE UP (ref 32–36)
MCV RBC AUTO: 97.4 FL — SIGNIFICANT CHANGE UP (ref 80–100)
MONOCYTES # BLD AUTO: 0.9 K/UL — SIGNIFICANT CHANGE UP (ref 0–0.9)
MONOCYTES NFR BLD AUTO: 5.9 % — SIGNIFICANT CHANGE UP (ref 2–14)
NEUTROPHILS # BLD AUTO: 13.1 K/UL — HIGH (ref 1.8–7.4)
NEUTROPHILS NFR BLD AUTO: 88.2 % — HIGH (ref 43–77)
PHOSPHATE SERPL-MCNC: 5.5 MG/DL — HIGH (ref 2.5–4.5)
PLATELET # BLD AUTO: 208 K/UL — SIGNIFICANT CHANGE UP (ref 150–400)
POTASSIUM SERPL-MCNC: 5.6 MMOL/L — HIGH (ref 3.5–5.3)
POTASSIUM SERPL-SCNC: 5.6 MMOL/L — HIGH (ref 3.5–5.3)
PROTHROM AB SERPL-ACNC: 11.8 SEC — SIGNIFICANT CHANGE UP (ref 10–12.9)
RBC # BLD: 3.36 M/UL — LOW (ref 4.2–5.8)
RBC # FLD: 12.6 % — SIGNIFICANT CHANGE UP (ref 10.3–14.5)
SODIUM SERPL-SCNC: 137 MMOL/L — SIGNIFICANT CHANGE UP (ref 135–145)
TACROLIMUS SERPL-MCNC: 2.9 NG/ML — SIGNIFICANT CHANGE UP
WBC # BLD: 14.8 K/UL — HIGH (ref 3.8–10.5)
WBC # FLD AUTO: 14.8 K/UL — HIGH (ref 3.8–10.5)

## 2019-02-02 PROCEDURE — 90945 DIALYSIS ONE EVALUATION: CPT

## 2019-02-02 PROCEDURE — 99232 SBSQ HOSP IP/OBS MODERATE 35: CPT | Mod: GC,24

## 2019-02-02 PROCEDURE — 93010 ELECTROCARDIOGRAM REPORT: CPT

## 2019-02-02 RX ORDER — FUROSEMIDE 40 MG
80 TABLET ORAL ONCE
Qty: 0 | Refills: 0 | Status: COMPLETED | OUTPATIENT
Start: 2019-02-02 | End: 2019-02-02

## 2019-02-02 RX ORDER — INSULIN DETEMIR 100/ML (3)
18 INSULIN PEN (ML) SUBCUTANEOUS
Qty: 0 | Refills: 0 | Status: DISCONTINUED | OUTPATIENT
Start: 2019-02-02 | End: 2019-02-04

## 2019-02-02 RX ORDER — SODIUM CHLORIDE 9 MG/ML
1000 INJECTION INTRAMUSCULAR; INTRAVENOUS; SUBCUTANEOUS ONCE
Qty: 0 | Refills: 0 | Status: COMPLETED | OUTPATIENT
Start: 2019-02-02 | End: 2019-02-02

## 2019-02-02 RX ADMIN — Medication 80 MILLIGRAM(S): at 06:13

## 2019-02-02 RX ADMIN — Medication 18 UNIT(S): at 13:59

## 2019-02-02 RX ADMIN — Medication 2: at 08:33

## 2019-02-02 RX ADMIN — Medication 125 MILLIGRAM(S): at 06:06

## 2019-02-02 RX ADMIN — HYDROMORPHONE HYDROCHLORIDE 30 MILLILITER(S): 2 INJECTION INTRAMUSCULAR; INTRAVENOUS; SUBCUTANEOUS at 19:07

## 2019-02-02 RX ADMIN — SODIUM CHLORIDE 2000 MILLILITER(S): 9 INJECTION INTRAMUSCULAR; INTRAVENOUS; SUBCUTANEOUS at 00:15

## 2019-02-02 RX ADMIN — FAMOTIDINE 20 MILLIGRAM(S): 10 INJECTION INTRAVENOUS at 12:43

## 2019-02-02 RX ADMIN — Medication 125 MILLIGRAM(S): at 18:20

## 2019-02-02 RX ADMIN — CEFTRIAXONE 100 GRAM(S): 500 INJECTION, POWDER, FOR SOLUTION INTRAMUSCULAR; INTRAVENOUS at 22:18

## 2019-02-02 RX ADMIN — TACROLIMUS 2 MILLIGRAM(S): 5 CAPSULE ORAL at 18:20

## 2019-02-02 RX ADMIN — Medication 500000 UNIT(S): at 22:18

## 2019-02-02 RX ADMIN — Medication 500000 UNIT(S): at 18:20

## 2019-02-02 RX ADMIN — MYCOPHENOLATE MOFETIL 1 GRAM(S): 250 CAPSULE ORAL at 18:20

## 2019-02-02 RX ADMIN — Medication 2: at 22:17

## 2019-02-02 RX ADMIN — VALGANCICLOVIR 450 MILLIGRAM(S): 450 TABLET, FILM COATED ORAL at 12:42

## 2019-02-02 RX ADMIN — Medication 500000 UNIT(S): at 12:42

## 2019-02-02 RX ADMIN — TACROLIMUS 2 MILLIGRAM(S): 5 CAPSULE ORAL at 06:07

## 2019-02-02 RX ADMIN — HYDROMORPHONE HYDROCHLORIDE 30 MILLILITER(S): 2 INJECTION INTRAMUSCULAR; INTRAVENOUS; SUBCUTANEOUS at 07:21

## 2019-02-02 RX ADMIN — Medication 1: at 18:21

## 2019-02-02 RX ADMIN — Medication 1 TABLET(S): at 18:26

## 2019-02-02 RX ADMIN — MYCOPHENOLATE MOFETIL 1 GRAM(S): 250 CAPSULE ORAL at 06:06

## 2019-02-02 RX ADMIN — Medication 500000 UNIT(S): at 06:07

## 2019-02-02 RX ADMIN — Medication 5: at 12:43

## 2019-02-02 NOTE — PROGRESS NOTE ADULT - SUBJECTIVE AND OBJECTIVE BOX
Transplant Surgery - Multidisciplinary Rounds  --------------------------------------------------------------  Directed donor DDRT    Date: 2/1/19        POD# 1    Present:   Patient seen with multidisciplinary team including ( Transplant Surgeon: Dr. Herbert. Transplant Nephrologist:  Dr. Waggoner. Nurse Practitioner: Mariana Velásquez in am rounds and examined with Dr. Herbert.  Disciplines not in attendance will be notified of the plan.     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 Pt presents to [facility] as a direct admit for DDRT. Pt states he instilled 1 bag of PD fluid at 10PM PTA. Pt continues to make urine. The patient denies fever, chills; chest pain, SOB, palpitation; dizziness, weakness; nausea, vomiting; diarrhea, constipation; abdominal pain; dysuria, hematuria; leg swelling; sick contacts; and recent travel.  Interval Events:    Potential Discharge date:    Education:  Medications    Plan of care:  See Below    MEDICATIONS  (STANDING):  cefTRIAXone   IVPB      cefTRIAXone   IVPB 1 Gram(s) IV Intermittent every 24 hours  dextrose 5%. 1000 milliLiter(s) (50 mL/Hr) IV Continuous <Continuous>  dextrose 50% Injectable 12.5 Gram(s) IV Push once  dextrose 50% Injectable 25 Gram(s) IV Push once  dextrose 50% Injectable 25 Gram(s) IV Push once  famotidine    Tablet 20 milliGRAM(s) Oral daily  HYDROmorphone  Injectable 0.5 milliGRAM(s) IV Push once  HYDROmorphone PCA (1 mG/mL) 30 milliLiter(s) PCA Continuous PCA Continuous  insulin detemir injectable (LEVEMIR) 18 Unit(s) SubCutaneous before breakfast  insulin lispro (HumaLOG) corrective regimen sliding scale   SubCutaneous Before meals and at bedtime  methylPREDNISolone sodium succinate Injectable 125 milliGRAM(s) IV Push two times a day  mycophenolate mofetil 1 Gram(s) Oral every 12 hours  nystatin    Suspension 426481 Unit(s) Swish and Swallow four times a day  tacrolimus 2 milliGRAM(s) Oral every 12 hours  trimethoprim   80 mG/sulfamethoxazole 400 mG 1 Tablet(s) Oral daily  valGANciclovir 450 milliGRAM(s) Oral daily    MEDICATIONS  (PRN):  dextrose 40% Gel 15 Gram(s) Oral once PRN Blood Glucose LESS THAN 70 milliGRAM(s)/deciliter  glucagon  Injectable 1 milliGRAM(s) IntraMuscular once PRN Glucose LESS THAN 70 milligrams/deciliter  HYDROmorphone  Injectable 0.5 milliGRAM(s) IV Push every 10 minutes PRN Severe Pain (7 - 10)  HYDROmorphone PCA (1 mG/mL) Rescue Clinician Bolus 0.25 milliGRAM(s) IV Push every 15 minutes PRN for Pain Scale GREATER THAN 6  naloxone Injectable 0.1 milliGRAM(s) IV Push every 3 minutes PRN For ANY of the following changes in patient status:  A. RR LESS THAN 10 breaths per minute, B. Oxygen saturation LESS THAN 90%, C. Sedation score of 6  ondansetron Injectable 4 milliGRAM(s) IV Push every 6 hours PRN Nausea and/or Vomiting      PAST MEDICAL & SURGICAL HISTORY:  DM2 (diabetes mellitus, type 2)  Morbid obesity  Hypothyroidism  ESRD on peritoneal dialysis  HLD (hyperlipidemia)  History of partial gastrectomy  History of cholecystectomy  History of Porsha-en-Y gastric bypass      Vital Signs Last 24 Hrs  T(C): 36.1 (02 Feb 2019 12:40), Max: 36.8 (02 Feb 2019 06:38)  T(F): 97 (02 Feb 2019 12:40), Max: 98.3 (02 Feb 2019 06:38)  HR: 92 (02 Feb 2019 12:40) (79 - 98)  BP: 109/68 (02 Feb 2019 12:40) (92/54 - 151/72)  BP(mean): 97 (01 Feb 2019 19:00) (85 - 106)  RR: 18 (02 Feb 2019 12:40) (14 - 20)  SpO2: 95% (02 Feb 2019 12:40) (90% - 100%)    I&O's Summary    01 Feb 2019 07:01 - 02 Feb 2019 07:00  --------------------------------------------------------  IN: 3325 mL / OUT: 1055 mL / NET: 2270 mL    02 Feb 2019 07:01  -  02 Feb 2019 12:46  --------------------------------------------------------  IN: 570 mL / OUT: 210 mL / NET: 360 mL                              10.8   14.8  )-----------( 208      ( 02 Feb 2019 04:55 )             32.7     02-02    137  |  106  |  55<H>  ----------------------------<  204<H>  5.6<H>   |  18<L>  |  5.08<H>    Ca    7.0<L>      02 Feb 2019 04:55  Phos  5.5     02-02  Mg     1.8     02-02    TPro  7.1  /  Alb  3.4  /  TBili  0.3  /  DBili  x   /  AST  22  /  ALT  17  /  AlkPhos  91  02-01    Tacrolimus (), Serum: 2.9 ng/mL (02-02 @ 08:24)      Review of systems  Gen: No weight changes, fatigue, fevers/chills, weakness  Skin: No rashes  Head/Eyes/Ears/Mouth: No headache; Normal hearing; Normal vision w/o blurriness; No sinus pain/discomfort, sore throat  Respiratory: No dyspnea, cough, wheezing, hemoptysis  CV: No chest pain, PND, orthopnea  GI: C/O mild abdominal pain at surgical site, diarrhea, constipation, nausea, vomiting, melena, hematochezia  : No increased frequency, dysuria, hematuria, nocturia  MSK: No joint pain/swelling; no back pain; no edema  Neuro: No dizziness/lightheadedness, weakness, seizures, numbness, tingling  Heme: No easy bruising or bleeding  Endo: No heat/cold intolerance  Psych: No significant nervousness, anxiety, stress, depression  All other systems were reviewed and are negative, except as noted.      PHYSICAL EXAM:  Constitutional: Well developed / well nourished  Eyes: Anicteric, PERRLA  ENMT: nc/at  Neck: central line *****************  Respiratory: CTA B/L  Cardiovascular: RRR  Gastrointestinal: Soft abdomen, mild tender to touch at surgical site, ND  Genitourinary: Urinary catheter in place*****Voiding spontaneously  Extremities: SCD's in place and working bilaterally  Vascular: Palpable dp pulses bilaterally  Neurological: A&O x3  Skin: Mild serosanguinous ronn on wound dressing*******Wound open to air no erythema and evidence of infection noted  Musculoskeletal: Moving all extremities  Psychiatric: Responsive Transplant Surgery - Multidisciplinary Rounds  --------------------------------------------------------------  Directed donation DDRT    Date: 2/1/19        POD# 1    Present:   Patient seen with multidisciplinary team including ( Transplant Surgeon: Dr. Herbert. Transplant Nephrologist:  Dr. Waggoner. Nurse Practitioner: Mariana Velásquez in am rounds and examined with Dr. Herbert.  Disciplines not in attendance will be notified of the plan.     74 y/o gentleman w/PMH sig for fatty pseudo tumor right requiring high dose steroids, DM2 (on Levemir), ESRD on PD (last session 22:00 1/31), h/o morbid obesity s/p RNYGB 2005 [place], h/o bleeding peptic ulcer s/p partial gastrectomy 7/2016, HLD, Hypothyroidism.  Pt presents to [facility] as a direct admit for DDRT. Pt states he instilled 1 bag of PD fluid at 10PM PTA.  Pt continues to make urine.  The patient denies fever, chills; chest pain, SOB, palpitation; dizziness, weakness; nausea, vomiting; diarrhea, constipation; abdominal pain; dysuria, hematuria; leg swelling; sick contacts; and recent travel.  Now s/p directed donor renal transplant on 2/1/2019.  2 renal arteries anastomosed to iliac via carrel patch, 1 vein, 1 ureter.   Recipient:  PRA: 0%  ABO: O last HD: PD 22:00 (1/31)  Donor: [facility] donor incarcerated COD: Drug overdose, anoxia Donor ID: [id number]  60y/o  CMV - /EBV + right kidney ABO: O KDPI: 81% cross clamp time: 01/31/2019 16:32.     Interval Events: Ultrasound in PACU limited but no obvious BLESSING, tardus parvus, collection or hydro.  Received 1L NS for low uop overnight.  SCr 5.08, 1L uop. K+ 5.6.  Lasix 80mg at 6am without response.  Pain well controlled with PCA.  Tolerating clear liquids. Passing flatus. No BM. Ceftriaxone for positive donor culture. Ambulating    Potential Discharge date:  2/5/19    Education:  Medications    Plan of care:  See Below    MEDICATIONS  (STANDING):  cefTRIAXone   IVPB      cefTRIAXone   IVPB 1 Gram(s) IV Intermittent every 24 hours  dextrose 5%. 1000 milliLiter(s) (50 mL/Hr) IV Continuous <Continuous>  dextrose 50% Injectable 12.5 Gram(s) IV Push once  dextrose 50% Injectable 25 Gram(s) IV Push once  dextrose 50% Injectable 25 Gram(s) IV Push once  famotidine    Tablet 20 milliGRAM(s) Oral daily  HYDROmorphone  Injectable 0.5 milliGRAM(s) IV Push once  HYDROmorphone PCA (1 mG/mL) 30 milliLiter(s) PCA Continuous PCA Continuous  insulin detemir injectable (LEVEMIR) 18 Unit(s) SubCutaneous before breakfast  insulin lispro (HumaLOG) corrective regimen sliding scale   SubCutaneous Before meals and at bedtime  methylPREDNISolone sodium succinate Injectable 125 milliGRAM(s) IV Push two times a day  mycophenolate mofetil 1 Gram(s) Oral every 12 hours  nystatin    Suspension [id number] Unit(s) Swish and Swallow four times a day  tacrolimus 2 milliGRAM(s) Oral every 12 hours  trimethoprim   80 mG/sulfamethoxazole 400 mG 1 Tablet(s) Oral daily  valGANciclovir 450 milliGRAM(s) Oral daily    MEDICATIONS  (PRN):  dextrose 40% Gel 15 Gram(s) Oral once PRN Blood Glucose LESS THAN 70 milliGRAM(s)/deciliter  glucagon  Injectable 1 milliGRAM(s) IntraMuscular once PRN Glucose LESS THAN 70 milligrams/deciliter  HYDROmorphone  Injectable 0.5 milliGRAM(s) IV Push every 10 minutes PRN Severe Pain (7 - 10)  HYDROmorphone PCA (1 mG/mL) Rescue Clinician Bolus 0.25 milliGRAM(s) IV Push every 15 minutes PRN for Pain Scale GREATER THAN 6  naloxone Injectable 0.1 milliGRAM(s) IV Push every 3 minutes PRN For ANY of the following changes in patient status:  A. RR LESS THAN 10 breaths per minute, B. Oxygen saturation LESS THAN 90%, C. Sedation score of 6  ondansetron Injectable 4 milliGRAM(s) IV Push every 6 hours PRN Nausea and/or Vomiting      PAST MEDICAL & SURGICAL HISTORY:  DM2 (diabetes mellitus, type 2)  Morbid obesity  Hypothyroidism  ESRD on peritoneal dialysis  HLD (hyperlipidemia)  History of partial gastrectomy  History of cholecystectomy  History of Porsha-en-Y gastric bypass      Vital Signs Last 24 Hrs  T(C): 36.1 (02 Feb 2019 12:40), Max: 36.8 (02 Feb 2019 06:38)  T(F): 97 (02 Feb 2019 12:40), Max: 98.3 (02 Feb 2019 06:38)  HR: 92 (02 Feb 2019 12:40) (79 - 98)  BP: 109/68 (02 Feb 2019 12:40) (92/54 - 151/72)  BP(mean): 97 (01 Feb 2019 19:00) (85 - 106)  RR: 18 (02 Feb 2019 12:40) (14 - 20)  SpO2: 95% (02 Feb 2019 12:40) (90% - 100%)    I&O's Summary    01 Feb 2019 07:01  -  02 Feb 2019 07:00  --------------------------------------------------------  IN: 3325 mL / OUT: 1055 mL / NET: 2270 mL    02 Feb 2019 07:01  -  02 Feb 2019 12:46  --------------------------------------------------------  IN: 570 mL / OUT: 210 mL / NET: 360 mL                              10.8   14.8  )-----------( 208      ( 02 Feb 2019 04:55 )             32.7     02-02    137  |  106  |  55<H>  ----------------------------<  204<H>  5.6<H>   |  18<L>  |  5.08<H>    Ca    7.0<L>      02 Feb 2019 04:55  Phos  5.5     02-02  Mg     1.8     02-02    TPro  7.1  /  Alb  3.4  /  TBili  0.3  /  DBili  x   /  AST  22  /  ALT  17  /  AlkPhos  91  02-01    Tacrolimus (), Serum: 2.9 ng/mL (02-02 @ 08:24)    EXAM:  US KIDNEY TRANSPLANT W DOPP RT                       PROCEDURE DATE:  02/01/2019   INTERPRETATION:  CLINICAL INFORMATION: Postoperative renal transplant.  COMPARISON: None available.  TECHNIQUE: Grayscale, Color and spectral Doppler evaluation of a RIGHT   renal transplant.   FINDINGS:    Limited evaluation due to body habitus and positioning.    Renal Transplant: 10.2 cm. No large renal mass, hydronephrosis or calculi.    Urinary bladder: Underdistended around a Nguyen catheter. Small urine   within the bladder.    Peak iliac artery velocity is 175 cm/sec pre-anastomosis, 204 cm/sec at   the anastomosis, and 197 cm/sec post anastomosis.    A single transplant renal artery with early branching.  Transplant Renal Artery:   Peak systolic velocity is 236 cm/sec anastomosis, 101 cm/sec proximal, 75   cm/sec mid, 145 cm/sec distal. The hilum is not clearly defined. No   elevated velocities.    No tardus parvus waveforms.    Transplant Renal Vein: Patent.    IMPRESSION:     Limited evaluation due to body habitus and positioning.    No evidence of a significant renal artery stenosis. No peritransplant   collection.        Review of systems  Gen: No weight changes, fatigue, fevers/chills, weakness  Skin: No rashes  Head/Eyes/Ears/Mouth: No headache; Normal hearing; Normal vision w/o blurriness; No sinus pain/discomfort, sore throat  Respiratory: No dyspnea, cough, wheezing, hemoptysis  CV: No chest pain, PND, orthopnea  GI: C/O moderate abdominal pain at surgical site. Denies diarrhea, constipation, nausea, vomiting, melena, hematochezia  : No increased frequency, dysuria, hematuria, nocturia  MSK: No joint pain/swelling; no back pain; some generalized edema  Neuro: No dizziness/lightheadedness, weakness, seizures, numbness, tingling  Heme: No easy bruising or bleeding  Endo: No heat/cold intolerance  Psych: No significant nervousness, anxiety, stress, depression  All other systems were reviewed and are negative, except as noted.      PHYSICAL EXAM:  Constitutional: Well developed / well nourished  Eyes: Anicteric, PERRLA  ENMT: nc/at  Neck: supple  Respiratory: CTA B/L  Cardiovascular: RRR  Gastrointestinal: Soft abdomen, mild distension, mild tender to touch at surgical site, +BS  Genitourinary: Urinary catheter in place  Extremities: SCD's in place and working bilaterally, trace BLE edema, + R knee swelling (stable since knee replacement)  Vascular: Palpable dp pulses bilaterally  Neurological: A&O x3  Skin: Mild serosanguinous ronn on wound dressing, no erythema and evidence of infection noted  Musculoskeletal: Moving all extremities  Psychiatric: Responsive

## 2019-02-02 NOTE — PROGRESS NOTE ADULT - ASSESSMENT
73 yo M with ESRD on PD, hx of morbid obesity s/p RNYGB, DM2, and CAD/MI is admitted s/p directed DDRT on 2/1/19.

## 2019-02-02 NOTE — PROGRESS NOTE ADULT - ASSESSMENT
72 y/o gentleman w/PMH sig for fatty pseudo tumor right requiring high dose steroids, DM2, ESRD on PD h/o morbid obesity s/p RNYGB 2005 [place], h/o bleeding peptic ulcer s/p partial gastrectomy 7/2016, HLD, Hypothyroidism now s/p directed donation DDRT on 1/31/19

## 2019-02-02 NOTE — PROGRESS NOTE ADULT - SUBJECTIVE AND OBJECTIVE BOX
[facility] DIVISION OF KIDNEY DISEASES AND HYPERTENSION -- FOLLOW UP NOTE  --------------------------------------------------------------------------------  Chief Complaint:  kidney transplant    24 hour events/subjective:  Pt still with 9/10 pain but better than yesterday.  UO dropping.  Passed gas.       PAST HISTORY  --------------------------------------------------------------------------------  No significant changes to PMH, PSH, FHx, SHx, unless otherwise noted    ALLERGIES & MEDICATIONS  --------------------------------------------------------------------------------  Allergies    Lipitor (Hepatotoxicity)    Intolerances      Standing Inpatient Medications  cefTRIAXone   IVPB      cefTRIAXone   IVPB 1 Gram(s) IV Intermittent every 24 hours  dextrose 5%. 1000 milliLiter(s) IV Continuous <Continuous>  dextrose 50% Injectable 12.5 Gram(s) IV Push once  dextrose 50% Injectable 25 Gram(s) IV Push once  dextrose 50% Injectable 25 Gram(s) IV Push once  famotidine    Tablet 20 milliGRAM(s) Oral daily  HYDROmorphone  Injectable 0.5 milliGRAM(s) IV Push once  HYDROmorphone PCA (1 mG/mL) 30 milliLiter(s) PCA Continuous PCA Continuous  insulin lispro (HumaLOG) corrective regimen sliding scale   SubCutaneous Before meals and at bedtime  methylPREDNISolone sodium succinate Injectable 125 milliGRAM(s) IV Push two times a day  mycophenolate mofetil 1 Gram(s) Oral every 12 hours  nystatin    Suspension 616331 Unit(s) Swish and Swallow four times a day  sodium chloride 0.9%. 1000 milliLiter(s) IV Continuous <Continuous>  sodium chloride 0.9%. 1000 milliLiter(s) IV Continuous <Continuous>  tacrolimus 2 milliGRAM(s) Oral every 12 hours  trimethoprim   80 mG/sulfamethoxazole 400 mG 1 Tablet(s) Oral daily  valGANciclovir 450 milliGRAM(s) Oral daily    PRN Inpatient Medications  dextrose 40% Gel 15 Gram(s) Oral once PRN  glucagon  Injectable 1 milliGRAM(s) IntraMuscular once PRN  HYDROmorphone  Injectable 0.5 milliGRAM(s) IV Push every 10 minutes PRN  HYDROmorphone PCA (1 mG/mL) Rescue Clinician Bolus 0.25 milliGRAM(s) IV Push every 15 minutes PRN  naloxone Injectable 0.1 milliGRAM(s) IV Push every 3 minutes PRN  ondansetron Injectable 4 milliGRAM(s) IV Push every 6 hours PRN      REVIEW OF SYSTEMS  --------------------------------------------------------------------------------  Gen: No fatigue, fevers/chills, weakness  Skin: No rashes  Head/Eyes/Ears/Mouth: No headache;No sore throat  Respiratory: No dyspnea, cough,   CV: No chest pain, PND, orthopnea  GI: No abdominal pain, diarrhea, constipation, nausea, vomiting  Transplant: + pain  : No increased frequency, dysuria, hematuria, nocturia  MSK: No joint pain/swelling; no back pain; no edema  Neuro: No dizziness/lightheadedness, weakness, seizures, numbness, tingling  Psych: No significant nervousness, anxiety, stress, depression    All other systems were reviewed and are negative, except as noted.    VITALS/PHYSICAL EXAM  --------------------------------------------------------------------------------  T(C): 36.4 (02-02-19 @ 07:46), Max: 36.8 (02-02-19 @ 06:38)  HR: 90 (02-02-19 @ 07:46) (79 - 98)  BP: 92/54 (02-02-19 @ 07:46) (92/54 - 151/72)  RR: 18 (02-02-19 @ 07:46) (14 - 20)  SpO2: 94% (02-02-19 @ 07:46) (90% - 100%)  Wt(kg): --  Height (cm): 180.34 (02-01-19 @ 05:57)  Weight (kg): 111.6 (02-01-19 @ 05:57)  BMI (kg/m2): 34.3 (02-01-19 @ 05:57)  BSA (m2): 2.3 (02-01-19 @ 05:57)      02-01-19 @ 07:01  -  02-02-19 @ 07:00  --------------------------------------------------------  IN: 3325 mL / OUT: 1055 mL / NET: 2270 mL    02-02-19 @ 07:01  -  02-02-19 @ 10:52  --------------------------------------------------------  IN: 450 mL / OUT: 170 mL / NET: 280 mL      Physical Exam:  	Gen: NAD, well-appearing  	HEENT: PERRL, supple neck, clear oropharynx  	Pulm: CTA B/L  	CV: RRR, S1S2; no rub  	Back: No spinal or CVA tenderness; no sacral edema  	Abd: +BS, soft, nontender/nondistended                      Transplant: No tenderness, swelling, incision c/d/i  	: No suprapubic tenderness  	UE: Warm, FROM, intact strength; no edema; no asterixis, LUE AVF  	LE: Warm, FROM, intact strength; no edema  	Neuro: No focal deficits=  	Psych: Normal affect and mood  	Skin: Warm, without rashes      LABS/STUDIES  --------------------------------------------------------------------------------              10.8   14.8  >-----------<  208      [02-02-19 @ 04:55]              32.7     137  |  106  |  55  ----------------------------<  204      [02-02-19 @ 04:55]  5.6   |  18  |  5.08        Ca     7.0     [02-02-19 @ 04:55]      Mg     1.8     [02-02-19 @ 04:55]      Phos  5.5     [02-02-19 @ 04:55]    TPro  7.1  /  Alb  3.4  /  TBili  0.3  /  DBili  x   /  AST  22  /  ALT  17  /  AlkPhos  91  [02-01-19 @ 12:38]    PT/INR: PT 11.8 , INR 1.03       [02-02-19 @ 04:55]  PTT: 28.2       [02-02-19 @ 04:55]          [02-02-19 @ 04:55]    Creatinine Trend:  SCr 5.08 [02-02 @ 04:55]  SCr 4.76 [02-01 @ 18:05]  SCr 4.52 [02-01 @ 12:38]  SCr 4.56 [02-01 @ 04:55]    Tacrolimus (), Serum: 2.9 ng/mL (02-02 @ 08:24)                HbA1c 6.2      [02-02-19 @ 08:28]

## 2019-02-02 NOTE — PROGRESS NOTE ADULT - SUBJECTIVE AND OBJECTIVE BOX
Day _1__ of Anesthesia Pain Management Service    SUBJECTIVE:    Pain Scale Score	At rest: ___ 	With Activity: ___ 	[x ] Refer to charted pain scores    THERAPY:    [ ] IV PCA Morphine		[ ] 5 mg/mL	[ ] 1 mg/mL  [x ] IV PCA Hydromorphone	[ ] 5 mg/mL	[x ] 1 mg/mL  [ ] IV PCA Fentanyl		[ ] 50 micrograms/mL    Demand dose 0.2    lockout 6   (minutes) Continuous Rate 0      MEDICATIONS  (STANDING):  cefTRIAXone   IVPB      cefTRIAXone   IVPB 1 Gram(s) IV Intermittent every 24 hours  dextrose 5%. 1000 milliLiter(s) (50 mL/Hr) IV Continuous <Continuous>  dextrose 50% Injectable 12.5 Gram(s) IV Push once  dextrose 50% Injectable 25 Gram(s) IV Push once  dextrose 50% Injectable 25 Gram(s) IV Push once  famotidine    Tablet 20 milliGRAM(s) Oral daily  HYDROmorphone  Injectable 0.5 milliGRAM(s) IV Push once  HYDROmorphone PCA (1 mG/mL) 30 milliLiter(s) PCA Continuous PCA Continuous  insulin lispro (HumaLOG) corrective regimen sliding scale   SubCutaneous Before meals and at bedtime  methylPREDNISolone sodium succinate Injectable 125 milliGRAM(s) IV Push two times a day  mycophenolate mofetil 1 Gram(s) Oral every 12 hours  nystatin    Suspension 207323 Unit(s) Swish and Swallow four times a day  sodium chloride 0.9%. 1000 milliLiter(s) (30 mL/Hr) IV Continuous <Continuous>  sodium chloride 0.9%. 1000 milliLiter(s) (70 mL/Hr) IV Continuous <Continuous>  tacrolimus 2 milliGRAM(s) Oral every 12 hours  trimethoprim   80 mG/sulfamethoxazole 400 mG 1 Tablet(s) Oral daily  valGANciclovir 450 milliGRAM(s) Oral daily    MEDICATIONS  (PRN):  dextrose 40% Gel 15 Gram(s) Oral once PRN Blood Glucose LESS THAN 70 milliGRAM(s)/deciliter  glucagon  Injectable 1 milliGRAM(s) IntraMuscular once PRN Glucose LESS THAN 70 milligrams/deciliter  HYDROmorphone  Injectable 0.5 milliGRAM(s) IV Push every 10 minutes PRN Severe Pain (7 - 10)  HYDROmorphone PCA (1 mG/mL) Rescue Clinician Bolus 0.25 milliGRAM(s) IV Push every 15 minutes PRN for Pain Scale GREATER THAN 6  naloxone Injectable 0.1 milliGRAM(s) IV Push every 3 minutes PRN For ANY of the following changes in patient status:  A. RR LESS THAN 10 breaths per minute, B. Oxygen saturation LESS THAN 90%, C. Sedation score of 6  ondansetron Injectable 4 milliGRAM(s) IV Push every 6 hours PRN Nausea and/or Vomiting      OBJECTIVE:    Sedation Score:	[x ] Alert	[ ] Drowsy 	[ ] Arousable	[ ] Asleep	[ ] Unresponsive    Side Effects:	[x ] None	[ ] Nausea	[ ] Vomiting	[ ] Pruritus  		[ ] Other:    Vital Signs Last 24 Hrs  T(C): 36.7 (02 Feb 2019 04:28), Max: 36.7 (01 Feb 2019 22:30)  T(F): 98.1 (02 Feb 2019 04:28), Max: 98.1 (01 Feb 2019 22:30)  HR: 86 (02 Feb 2019 04:54) (79 - 98)  BP: 98/61 (02 Feb 2019 04:54) (93/54 - 151/72)  BP(mean): 97 (01 Feb 2019 19:00) (85 - 107)  RR: 18 (02 Feb 2019 04:54) (14 - 20)  SpO2: 95% (02 Feb 2019 04:54) (92% - 100%)    ASSESSMENT/ PLAN    Therapy to  be:	[x ] Continue   [ ] Discontinued   [ ] Change to prn Analgesics    Documentation and Verification of current medications:   [X] Done	[ ] Not done, not eligible    Comments:

## 2019-02-03 LAB
ALBUMIN SERPL ELPH-MCNC: 3.1 G/DL — LOW (ref 3.3–5)
ALP SERPL-CCNC: 80 U/L — SIGNIFICANT CHANGE UP (ref 40–120)
ALT FLD-CCNC: 13 U/L — SIGNIFICANT CHANGE UP (ref 10–45)
ANION GAP SERPL CALC-SCNC: 14 MMOL/L — SIGNIFICANT CHANGE UP (ref 5–17)
AST SERPL-CCNC: 15 U/L — SIGNIFICANT CHANGE UP (ref 10–40)
BASOPHILS # BLD AUTO: 0 K/UL — SIGNIFICANT CHANGE UP (ref 0–0.2)
BASOPHILS NFR BLD AUTO: 0.1 % — SIGNIFICANT CHANGE UP (ref 0–2)
BILIRUB SERPL-MCNC: 0.2 MG/DL — SIGNIFICANT CHANGE UP (ref 0.2–1.2)
BUN SERPL-MCNC: 46 MG/DL — HIGH (ref 7–23)
CALCIUM SERPL-MCNC: 6.9 MG/DL — LOW (ref 8.4–10.5)
CHLORIDE SERPL-SCNC: 97 MMOL/L — SIGNIFICANT CHANGE UP (ref 96–108)
CO2 SERPL-SCNC: 23 MMOL/L — SIGNIFICANT CHANGE UP (ref 22–31)
CREAT SERPL-MCNC: 4.17 MG/DL — HIGH (ref 0.5–1.3)
EOSINOPHIL # BLD AUTO: 0.1 K/UL — SIGNIFICANT CHANGE UP (ref 0–0.5)
EOSINOPHIL NFR BLD AUTO: 0.5 % — SIGNIFICANT CHANGE UP (ref 0–6)
GLUCOSE SERPL-MCNC: 345 MG/DL — HIGH (ref 70–99)
HCT VFR BLD CALC: 28.2 % — LOW (ref 39–50)
HGB BLD-MCNC: 9.8 G/DL — LOW (ref 13–17)
LDH SERPL L TO P-CCNC: 183 U/L — SIGNIFICANT CHANGE UP (ref 50–242)
LYMPHOCYTES # BLD AUTO: 0.5 K/UL — LOW (ref 1–3.3)
LYMPHOCYTES # BLD AUTO: 3.5 % — LOW (ref 13–44)
MAGNESIUM SERPL-MCNC: 1.9 MG/DL — SIGNIFICANT CHANGE UP (ref 1.6–2.6)
MCHC RBC-ENTMCNC: 33.7 PG — SIGNIFICANT CHANGE UP (ref 27–34)
MCHC RBC-ENTMCNC: 34.8 GM/DL — SIGNIFICANT CHANGE UP (ref 32–36)
MCV RBC AUTO: 96.7 FL — SIGNIFICANT CHANGE UP (ref 80–100)
MONOCYTES # BLD AUTO: 0.8 K/UL — SIGNIFICANT CHANGE UP (ref 0–0.9)
MONOCYTES NFR BLD AUTO: 5.4 % — SIGNIFICANT CHANGE UP (ref 2–14)
NEUTROPHILS # BLD AUTO: 12.8 K/UL — HIGH (ref 1.8–7.4)
NEUTROPHILS NFR BLD AUTO: 90.4 % — HIGH (ref 43–77)
PHOSPHATE SERPL-MCNC: 5 MG/DL — HIGH (ref 2.5–4.5)
PLATELET # BLD AUTO: 170 K/UL — SIGNIFICANT CHANGE UP (ref 150–400)
POTASSIUM SERPL-MCNC: 4.6 MMOL/L — SIGNIFICANT CHANGE UP (ref 3.5–5.3)
POTASSIUM SERPL-SCNC: 4.6 MMOL/L — SIGNIFICANT CHANGE UP (ref 3.5–5.3)
PROT SERPL-MCNC: 6.8 G/DL — SIGNIFICANT CHANGE UP (ref 6–8.3)
RBC # BLD: 2.92 M/UL — LOW (ref 4.2–5.8)
RBC # FLD: 12.5 % — SIGNIFICANT CHANGE UP (ref 10.3–14.5)
SODIUM SERPL-SCNC: 134 MMOL/L — LOW (ref 135–145)
TACROLIMUS SERPL-MCNC: 3.3 NG/ML — SIGNIFICANT CHANGE UP
WBC # BLD: 14.1 K/UL — HIGH (ref 3.8–10.5)
WBC # FLD AUTO: 14.1 K/UL — HIGH (ref 3.8–10.5)

## 2019-02-03 PROCEDURE — 99232 SBSQ HOSP IP/OBS MODERATE 35: CPT

## 2019-02-03 PROCEDURE — 76776 US EXAM K TRANSPL W/DOPPLER: CPT | Mod: 26,RT

## 2019-02-03 PROCEDURE — 99232 SBSQ HOSP IP/OBS MODERATE 35: CPT | Mod: GC,24

## 2019-02-03 RX ORDER — SENNA PLUS 8.6 MG/1
2 TABLET ORAL AT BEDTIME
Qty: 0 | Refills: 0 | Status: DISCONTINUED | OUTPATIENT
Start: 2019-02-03 | End: 2019-02-06

## 2019-02-03 RX ORDER — INSULIN DETEMIR 100/ML (3)
18 INSULIN PEN (ML) SUBCUTANEOUS AT BEDTIME
Qty: 0 | Refills: 0 | Status: DISCONTINUED | OUTPATIENT
Start: 2019-02-03 | End: 2019-02-04

## 2019-02-03 RX ORDER — INSULIN LISPRO 100/ML
5 VIAL (ML) SUBCUTANEOUS
Qty: 0 | Refills: 0 | Status: DISCONTINUED | OUTPATIENT
Start: 2019-02-03 | End: 2019-02-04

## 2019-02-03 RX ORDER — MAGNESIUM SULFATE 500 MG/ML
1 VIAL (ML) INJECTION ONCE
Qty: 0 | Refills: 0 | Status: COMPLETED | OUTPATIENT
Start: 2019-02-03 | End: 2019-02-03

## 2019-02-03 RX ORDER — VALGANCICLOVIR 450 MG/1
450 TABLET, FILM COATED ORAL
Qty: 0 | Refills: 0 | Status: DISCONTINUED | OUTPATIENT
Start: 2019-02-03 | End: 2019-02-06

## 2019-02-03 RX ORDER — TACROLIMUS 5 MG/1
4 CAPSULE ORAL
Qty: 0 | Refills: 0 | Status: DISCONTINUED | OUTPATIENT
Start: 2019-02-03 | End: 2019-02-04

## 2019-02-03 RX ORDER — OXYCODONE HYDROCHLORIDE 5 MG/1
5 TABLET ORAL EVERY 4 HOURS
Qty: 0 | Refills: 0 | Status: DISCONTINUED | OUTPATIENT
Start: 2019-02-03 | End: 2019-02-06

## 2019-02-03 RX ORDER — DOCUSATE SODIUM 100 MG
100 CAPSULE ORAL
Qty: 0 | Refills: 0 | Status: DISCONTINUED | OUTPATIENT
Start: 2019-02-03 | End: 2019-02-06

## 2019-02-03 RX ORDER — ACETAMINOPHEN 500 MG
650 TABLET ORAL EVERY 6 HOURS
Qty: 0 | Refills: 0 | Status: DISCONTINUED | OUTPATIENT
Start: 2019-02-03 | End: 2019-02-06

## 2019-02-03 RX ORDER — CEFTRIAXONE 500 MG/1
1 INJECTION, POWDER, FOR SOLUTION INTRAMUSCULAR; INTRAVENOUS EVERY 24 HOURS
Qty: 0 | Refills: 0 | Status: COMPLETED | OUTPATIENT
Start: 2019-02-03 | End: 2019-02-03

## 2019-02-03 RX ORDER — OXYCODONE HYDROCHLORIDE 5 MG/1
10 TABLET ORAL EVERY 4 HOURS
Qty: 0 | Refills: 0 | Status: DISCONTINUED | OUTPATIENT
Start: 2019-02-03 | End: 2019-02-06

## 2019-02-03 RX ADMIN — CEFTRIAXONE 100 GRAM(S): 500 INJECTION, POWDER, FOR SOLUTION INTRAMUSCULAR; INTRAVENOUS at 23:14

## 2019-02-03 RX ADMIN — FAMOTIDINE 20 MILLIGRAM(S): 10 INJECTION INTRAVENOUS at 12:29

## 2019-02-03 RX ADMIN — Medication 5 UNIT(S): at 12:27

## 2019-02-03 RX ADMIN — HYDROMORPHONE HYDROCHLORIDE 30 MILLILITER(S): 2 INJECTION INTRAMUSCULAR; INTRAVENOUS; SUBCUTANEOUS at 07:26

## 2019-02-03 RX ADMIN — Medication 100 MILLIGRAM(S): at 13:44

## 2019-02-03 RX ADMIN — Medication 500000 UNIT(S): at 06:22

## 2019-02-03 RX ADMIN — MYCOPHENOLATE MOFETIL 1 GRAM(S): 250 CAPSULE ORAL at 18:02

## 2019-02-03 RX ADMIN — Medication 5 UNIT(S): at 16:50

## 2019-02-03 RX ADMIN — TACROLIMUS 4 MILLIGRAM(S): 5 CAPSULE ORAL at 17:40

## 2019-02-03 RX ADMIN — Medication 50 GRAM(S): at 10:51

## 2019-02-03 RX ADMIN — Medication 100 MILLIGRAM(S): at 23:15

## 2019-02-03 RX ADMIN — Medication 3: at 12:28

## 2019-02-03 RX ADMIN — Medication 4: at 08:27

## 2019-02-03 RX ADMIN — Medication 500000 UNIT(S): at 17:40

## 2019-02-03 RX ADMIN — TACROLIMUS 2 MILLIGRAM(S): 5 CAPSULE ORAL at 06:19

## 2019-02-03 RX ADMIN — OXYCODONE HYDROCHLORIDE 10 MILLIGRAM(S): 5 TABLET ORAL at 14:44

## 2019-02-03 RX ADMIN — SENNA PLUS 2 TABLET(S): 8.6 TABLET ORAL at 23:16

## 2019-02-03 RX ADMIN — Medication 3: at 16:50

## 2019-02-03 RX ADMIN — OXYCODONE HYDROCHLORIDE 5 MILLIGRAM(S): 5 TABLET ORAL at 16:48

## 2019-02-03 RX ADMIN — Medication 60 MILLIGRAM(S): at 17:39

## 2019-02-03 RX ADMIN — MYCOPHENOLATE MOFETIL 1 GRAM(S): 250 CAPSULE ORAL at 06:22

## 2019-02-03 RX ADMIN — Medication 18 UNIT(S): at 23:15

## 2019-02-03 RX ADMIN — Medication 18 UNIT(S): at 08:38

## 2019-02-03 RX ADMIN — Medication 60 MILLIGRAM(S): at 06:22

## 2019-02-03 RX ADMIN — Medication 2: at 23:15

## 2019-02-03 RX ADMIN — OXYCODONE HYDROCHLORIDE 5 MILLIGRAM(S): 5 TABLET ORAL at 17:48

## 2019-02-03 RX ADMIN — Medication 500000 UNIT(S): at 23:17

## 2019-02-03 RX ADMIN — Medication 500000 UNIT(S): at 12:28

## 2019-02-03 RX ADMIN — OXYCODONE HYDROCHLORIDE 10 MILLIGRAM(S): 5 TABLET ORAL at 13:44

## 2019-02-03 RX ADMIN — Medication 1 TABLET(S): at 12:29

## 2019-02-03 NOTE — PROGRESS NOTE ADULT - SUBJECTIVE AND OBJECTIVE BOX
[facility] DIVISION OF KIDNEY DISEASES AND HYPERTENSION -- FOLLOW UP NOTE  --------------------------------------------------------------------------------  Chief Complaint:  kidney transplant    24 hour events/subjective:  Pt feels ok, has some LE edema.  Still has pain by incision      PAST HISTORY  --------------------------------------------------------------------------------  No significant changes to PMH, PSH, FHx, SHx, unless otherwise noted    ALLERGIES & MEDICATIONS  --------------------------------------------------------------------------------  Allergies    Lipitor (Hepatotoxicity)    Intolerances      Standing Inpatient Medications  cefTRIAXone   IVPB 1 Gram(s) IV Intermittent every 24 hours  dextrose 5%. 1000 milliLiter(s) IV Continuous <Continuous>  dextrose 50% Injectable 12.5 Gram(s) IV Push once  dextrose 50% Injectable 25 Gram(s) IV Push once  dextrose 50% Injectable 25 Gram(s) IV Push once  docusate sodium 100 milliGRAM(s) Oral two times a day  famotidine    Tablet 20 milliGRAM(s) Oral daily  insulin detemir injectable (LEVEMIR) 18 Unit(s) SubCutaneous before breakfast  insulin detemir injectable (LEVEMIR) 18 Unit(s) SubCutaneous at bedtime  insulin lispro (HumaLOG) corrective regimen sliding scale   SubCutaneous Before meals and at bedtime  insulin lispro Injectable (HumaLOG) 5 Unit(s) SubCutaneous three times a day before meals  methylPREDNISolone sodium succinate Injectable 60 milliGRAM(s) IV Push two times a day  mycophenolate mofetil 1 Gram(s) Oral every 12 hours  nystatin    Suspension 922778 Unit(s) Swish and Swallow four times a day  senna 2 Tablet(s) Oral at bedtime  tacrolimus 2 milliGRAM(s) Oral every 12 hours  trimethoprim   80 mG/sulfamethoxazole 400 mG 1 Tablet(s) Oral daily  valGANciclovir 450 milliGRAM(s) Oral <User Schedule>    PRN Inpatient Medications  acetaminophen   Tablet .. 650 milliGRAM(s) Oral every 6 hours PRN  dextrose 40% Gel 15 Gram(s) Oral once PRN  glucagon  Injectable 1 milliGRAM(s) IntraMuscular once PRN  ondansetron Injectable 4 milliGRAM(s) IV Push every 6 hours PRN  oxyCODONE    IR 5 milliGRAM(s) Oral every 4 hours PRN  oxyCODONE    IR 10 milliGRAM(s) Oral every 4 hours PRN      REVIEW OF SYSTEMS  --------------------------------------------------------------------------------  Gen: No fatigue, fevers/chills, weakness  Skin: No rashes  Head/Eyes/Ears/Mouth: No headache;No sore throat  Respiratory: No dyspnea, cough,   CV: No chest pain, PND, orthopnea  GI: No abdominal pain, diarrhea, constipation, nausea, vomiting  Transplant: pain by incision   : No increased frequency, dysuria, hematuria, nocturia  MSK: has edema  Neuro: No dizziness/lightheadedness, weakness, seizures, numbness, tingling  Psych: No significant nervousness, anxiety, stress, depression    All other systems were reviewed and are negative, except as noted.    VITALS/PHYSICAL EXAM  --------------------------------------------------------------------------------  T(C): 36.9 (02-03-19 @ 10:44), Max: 37.3 (02-03-19 @ 05:02)  HR: 99 (02-03-19 @ 10:44) (86 - 107)  BP: 132/80 (02-03-19 @ 10:44) (101/68 - 132/80)  RR: 18 (02-03-19 @ 10:44) (17 - 18)  SpO2: 96% (02-03-19 @ 10:44) (94% - 96%)  Wt(kg): --        02-02-19 @ 07:01  -  02-03-19 @ 07:00  --------------------------------------------------------  IN: 1710 mL / OUT: 675 mL / NET: 1035 mL    02-03-19 @ 07:01  -  02-03-19 @ 12:20  --------------------------------------------------------  IN: 240 mL / OUT: 50 mL / NET: 190 mL      Physical Exam:  	Gen: NAD, well-appearing  	HEENT: PERRL, supple neck, clear oropharynx  	Pulm: CTA B/L  	CV: RRR, S1S2; no rub  	Back: No spinal or CVA tenderness; no sacral edema  	Abd: +BS, soft, nontender/nondistended                      Transplant: No tenderness, swelling, incision c/d/i  	: No suprapubic tenderness  	UE: Warm, FROM, intact strength; no edema; no asterixis  	LE: Warm, FROM, intact strength; +1 LE edema  	Neuro: No focal deficits, intact gait  	Psych: Normal affect and mood  	Skin: Warm, without rashes      LABS/STUDIES  --------------------------------------------------------------------------------              9.8    14.1  >-----------<  170      [02-03-19 @ 06:13]              28.2     134  |  97  |  46  ----------------------------<  345      [02-03-19 @ 06:13]  4.6   |  23  |  4.17        Ca     6.9     [02-03-19 @ 06:13]      Mg     1.9     [02-03-19 @ 06:13]      Phos  5.0     [02-03-19 @ 06:13]    TPro  6.8  /  Alb  3.1  /  TBili  0.2  /  DBili  x   /  AST  15  /  ALT  13  /  AlkPhos  80  [02-03-19 @ 06:13]    PT/INR: PT 11.8 , INR 1.03       [02-02-19 @ 04:55]  PTT: 28.2       [02-02-19 @ 04:55]          [02-03-19 @ 06:13]    Creatinine Trend:  SCr 4.17 [02-03 @ 06:13]  SCr 5.08 [02-02 @ 04:55]  SCr 4.76 [02-01 @ 18:05]  SCr 4.52 [02-01 @ 12:38]  SCr 4.56 [02-01 @ 04:55]    Tacrolimus (), Serum: 3.3 ng/mL (02-03 @ 08:14)  Tacrolimus (), Serum: 2.9 ng/mL (02-02 @ 08:24)                HbA1c 6.2      [02-02-19 @ 08:28]    HBsAb 6.8      [02-02-19 @ 20:46]  HBsAg Nonreact      [02-02-19 @ 20:46]  HBcAb Nonreact      [02-02-19 @ 20:46]  HCV 0.14, Nonreact      [02-02-19 @ 20:46]

## 2019-02-03 NOTE — PROGRESS NOTE ADULT - SUBJECTIVE AND OBJECTIVE BOX
Transplant Surgery - Multidisciplinary Rounds  --------------------------------------------------------------  Directed donation DDRT    Date: 2/1/19        POD# 2    Present:   Patient seen with multidisciplinary team including ( Transplant Surgeon: Dr. Herbert. Transplant Nephrologist:  Dr. Waggoner. Nurse Practitioner: Mariana Velásquez in am rounds and examined with Dr. Herbert.  Disciplines not in attendance will be notified of the plan.     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  Pt presents to [facility] as a direct admit for DDRT. Pt states he instilled 1 bag of PD fluid at 10PM PTA.  Pt continues to make urine.  The patient denies fever, chills; chest pain, SOB, palpitation; dizziness, weakness; nausea, vomiting; diarrhea, constipation; abdominal pain; dysuria, hematuria; leg swelling; sick contacts; and recent travel.  Now s/p directed donor renal transplant on 2/1/2019.  2 renal arteries anastomosed to iliac via carrel patch, 1 vein, 1 ureter.   Recipient:  PRA: 0%  ABO: O last HD: PD 22:00 (1/31)  Donor: [facility] donor incarcerated COD: Drug overdose, anoxia. Elevated terminal creatinine in donor kidney.  Donor ID: [id number]  58y/o  CMV - /EBV + right kidney ABO: O KDPI: 81% cross clamp time: 01/31/2019 16:32.     Interval Events: Session HD yesterday without fluid removal. PD overnight. Tolerating CHO restricted diet.  Hyperglycemic.  Levemir started at home dose. +Flatus. Pain well controlled with PCA.  Ceftriaxone for positive donor culture.  Ambulating    Potential Discharge date:  2/6/19    Education:  Medications    Plan of care:  See Below    MEDICATIONS  (STANDING):  cefTRIAXone   IVPB 1 Gram(s) IV Intermittent every 24 hours  dextrose 5%. 1000 milliLiter(s) (50 mL/Hr) IV Continuous <Continuous>  dextrose 50% Injectable 12.5 Gram(s) IV Push once  dextrose 50% Injectable 25 Gram(s) IV Push once  dextrose 50% Injectable 25 Gram(s) IV Push once  docusate sodium 100 milliGRAM(s) Oral two times a day  famotidine    Tablet 20 milliGRAM(s) Oral daily  insulin detemir injectable (LEVEMIR) 18 Unit(s) SubCutaneous before breakfast  insulin detemir injectable (LEVEMIR) 18 Unit(s) SubCutaneous at bedtime  insulin lispro (HumaLOG) corrective regimen sliding scale   SubCutaneous Before meals and at bedtime  insulin lispro Injectable (HumaLOG) 5 Unit(s) SubCutaneous three times a day before meals  methylPREDNISolone sodium succinate Injectable 60 milliGRAM(s) IV Push two times a day  mycophenolate mofetil 1 Gram(s) Oral every 12 hours  nystatin    Suspension [id number] Unit(s) Swish and Swallow four times a day  senna 2 Tablet(s) Oral at bedtime  tacrolimus 2 milliGRAM(s) Oral every 12 hours  trimethoprim   80 mG/sulfamethoxazole 400 mG 1 Tablet(s) Oral daily  valGANciclovir 450 milliGRAM(s) Oral <User Schedule>    MEDICATIONS  (PRN):  acetaminophen   Tablet .. 650 milliGRAM(s) Oral every 6 hours PRN Mild Pain (1 - 3)  dextrose 40% Gel 15 Gram(s) Oral once PRN Blood Glucose LESS THAN 70 milliGRAM(s)/deciliter  glucagon  Injectable 1 milliGRAM(s) IntraMuscular once PRN Glucose LESS THAN 70 milligrams/deciliter  ondansetron Injectable 4 milliGRAM(s) IV Push every 6 hours PRN Nausea and/or Vomiting  oxyCODONE    IR 5 milliGRAM(s) Oral every 4 hours PRN Moderate Pain (4 - 6)  oxyCODONE    IR 10 milliGRAM(s) Oral every 4 hours PRN Severe Pain (7 - 10)      PAST MEDICAL & SURGICAL HISTORY:  DM2 (diabetes mellitus, type 2)  Morbid obesity  Hypothyroidism  ESRD on peritoneal dialysis  HLD (hyperlipidemia)  History of partial gastrectomy  History of cholecystectomy  History of Porsha-en-Y gastric bypass      Vital Signs Last 24 Hrs  T(C): 36.9 (03 Feb 2019 10:44), Max: 37.3 (03 Feb 2019 05:02)  T(F): 98.4 (03 Feb 2019 10:44), Max: 99.1 (03 Feb 2019 05:02)  HR: 99 (03 Feb 2019 10:44) (86 - 107)  BP: 132/80 (03 Feb 2019 10:44) (101/68 - 132/80)  BP(mean): --  RR: 18 (03 Feb 2019 10:44) (17 - 18)  SpO2: 96% (03 Feb 2019 10:44) (94% - 96%)    I&O's Summary    02 Feb 2019 07:01  -  03 Feb 2019 07:00  --------------------------------------------------------  IN: 1710 mL / OUT: 675 mL / NET: 1035 mL    03 Feb 2019 07:01  -  03 Feb 2019 12:00  --------------------------------------------------------  IN: 240 mL / OUT: 50 mL / NET: 190 mL                              9.8    14.1  )-----------( 170      ( 03 Feb 2019 06:13 )             28.2     02-03    134<L>  |  97  |  46<H>  ----------------------------<  345<H>  4.6   |  23  |  4.17<H>    Ca    6.9<L>      03 Feb 2019 06:13  Phos  5.0     02-03  Mg     1.9     02-03    TPro  6.8  /  Alb  3.1<L>  /  TBili  0.2  /  DBili  x   /  AST  15  /  ALT  13  /  AlkPhos  80  02-03    Tacrolimus (), Serum: 3.3 ng/mL (02-03 @ 08:14)      Review of systems  Gen: No weight changes, fatigue, fevers/chills, weakness  Skin: No rashes  Head/Eyes/Ears/Mouth: No headache; Normal hearing; Normal vision w/o blurriness; No sinus pain/discomfort, sore throat  Respiratory: No dyspnea, cough, wheezing, hemoptysis  CV: No chest pain, PND, orthopnea  GI: C/O moderate abdominal pain at surgical site. Denies diarrhea, constipation, nausea, vomiting, melena, hematochezia  : No increased frequency, dysuria, hematuria, nocturia  MSK: No joint pain/swelling; no back pain; some generalized edema  Neuro: No dizziness/lightheadedness, weakness, seizures, numbness, tingling  Heme: No easy bruising or bleeding  Endo: No heat/cold intolerance  Psych: No significant nervousness, anxiety, stress, depression  All other systems were reviewed and are negative, except as noted.      PHYSICAL EXAM:  Constitutional: Well developed / well nourished  Eyes: Anicteric, PERRLA  ENMT: nc/at  Neck: supple  Respiratory: CTA B/L  Cardiovascular: RRR  Gastrointestinal: Soft abdomen, ND, mild tender to touch at surgical site, +BS  Genitourinary: Urinary catheter in place  Extremities: SCD's in place and working bilaterally, trace BLE edema, + R knee swelling (stable since knee replacement)  Vascular: Palpable dp pulses bilaterally  Neurological: A&O x3  Skin: Mild serosanguinous ronn on wound dressing, no erythema and evidence of infection noted  Musculoskeletal: Moving all extremities  Psychiatric: Responsive

## 2019-02-03 NOTE — PROGRESS NOTE ADULT - SUBJECTIVE AND OBJECTIVE BOX
Day 2 of Anesthesia Pain Management Service    SUBJECTIVE: Patient is doing well with IV PCA    Pain Scale Score:	[X] Refer to charted pain scores    THERAPY:    [ ] IV PCA Morphine		[ ] 5 mg/mL	[ ] 1 mg/mL  [X] IV PCA Hydromorphone	[ ] 5 mg/mL	[X] 1 mg/mL  [ ] IV PCA Fentanyl		[ ] 50 micrograms/mL    Demand dose: 0.2 mg     Lockout: 6 minutes   Continuous Rate: 0 mg/hr  4 Hour Limit: 4 mg    MEDICATIONS  (STANDING):  cefTRIAXone   IVPB 1 Gram(s) IV Intermittent every 24 hours  dextrose 5%. 1000 milliLiter(s) (50 mL/Hr) IV Continuous <Continuous>  dextrose 50% Injectable 12.5 Gram(s) IV Push once  dextrose 50% Injectable 25 Gram(s) IV Push once  dextrose 50% Injectable 25 Gram(s) IV Push once  docusate sodium 100 milliGRAM(s) Oral two times a day  famotidine    Tablet 20 milliGRAM(s) Oral daily  HYDROmorphone  Injectable 0.5 milliGRAM(s) IV Push once  HYDROmorphone PCA (1 mG/mL) 30 milliLiter(s) PCA Continuous PCA Continuous  insulin detemir injectable (LEVEMIR) 18 Unit(s) SubCutaneous before breakfast  insulin lispro (HumaLOG) corrective regimen sliding scale   SubCutaneous Before meals and at bedtime  magnesium sulfate  IVPB 1 Gram(s) IV Intermittent once  methylPREDNISolone sodium succinate Injectable 60 milliGRAM(s) IV Push two times a day  mycophenolate mofetil 1 Gram(s) Oral every 12 hours  nystatin    Suspension [id number] Unit(s) Swish and Swallow four times a day  senna 2 Tablet(s) Oral at bedtime  tacrolimus 2 milliGRAM(s) Oral every 12 hours  trimethoprim   80 mG/sulfamethoxazole 400 mG 1 Tablet(s) Oral daily  valGANciclovir 450 milliGRAM(s) Oral daily    MEDICATIONS  (PRN):  dextrose 40% Gel 15 Gram(s) Oral once PRN Blood Glucose LESS THAN 70 milliGRAM(s)/deciliter  glucagon  Injectable 1 milliGRAM(s) IntraMuscular once PRN Glucose LESS THAN 70 milligrams/deciliter  HYDROmorphone  Injectable 0.5 milliGRAM(s) IV Push every 10 minutes PRN Severe Pain (7 - 10)  HYDROmorphone PCA (1 mG/mL) Rescue Clinician Bolus 0.25 milliGRAM(s) IV Push every 15 minutes PRN for Pain Scale GREATER THAN 6  naloxone Injectable 0.1 milliGRAM(s) IV Push every 3 minutes PRN For ANY of the following changes in patient status:  A. RR LESS THAN 10 breaths per minute, B. Oxygen saturation LESS THAN 90%, C. Sedation score of 6  ondansetron Injectable 4 milliGRAM(s) IV Push every 6 hours PRN Nausea and/or Vomiting      OBJECTIVE:    Sedation Score:	[ X] Alert	[ ] Drowsy 	[ ] Arousable	[ ] Asleep	[ ] Unresponsive    Side Effects:	[X ] None	[ ] Nausea	[ ] Vomiting	[ ] Pruritus  		[ ] Other:    Vital Signs Last 24 Hrs  T(C): 37.3 (03 Feb 2019 05:02), Max: 37.3 (03 Feb 2019 05:02)  T(F): 99.1 (03 Feb 2019 05:02), Max: 99.1 (03 Feb 2019 05:02)  HR: 102 (03 Feb 2019 05:02) (86 - 107)  BP: 116/70 (03 Feb 2019 05:02) (101/68 - 116/70)  BP(mean): --  RR: 18 (03 Feb 2019 05:02) (17 - 18)  SpO2: 95% (03 Feb 2019 05:02) (94% - 95%)    ASSESSMENT/ PLAN    Therapy to  be:               [X] Continued   [ ] Discontinued   [ ] Changed to PRN Analgesics    Documentation and Verification of current medications:   [X] Done	[ ] Not done, not eligible    Comments: Change to PO once tolerating diet

## 2019-02-03 NOTE — PROGRESS NOTE ADULT - ASSESSMENT
71 yo M with ESRD on PD, hx of morbid obesity s/p RNYGB, DM2, and CAD/MI is admitted s/p directed DDRT on 2/1/19 (59 y.o with ABELARDO terminal creatinine about 3).

## 2019-02-03 NOTE — PROGRESS NOTE ADULT - ASSESSMENT
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

## 2019-02-04 DIAGNOSIS — N17.9 ACUTE KIDNEY FAILURE, UNSPECIFIED: ICD-10-CM

## 2019-02-04 LAB
ALBUMIN SERPL ELPH-MCNC: 3.1 G/DL — LOW (ref 3.3–5)
ALP SERPL-CCNC: 84 U/L — SIGNIFICANT CHANGE UP (ref 40–120)
ALT FLD-CCNC: 10 U/L — SIGNIFICANT CHANGE UP (ref 10–45)
ANION GAP SERPL CALC-SCNC: 16 MMOL/L — SIGNIFICANT CHANGE UP (ref 5–17)
AST SERPL-CCNC: 16 U/L — SIGNIFICANT CHANGE UP (ref 10–40)
BASOPHILS # BLD AUTO: 0 K/UL — SIGNIFICANT CHANGE UP (ref 0–0.2)
BASOPHILS NFR BLD AUTO: 0.2 % — SIGNIFICANT CHANGE UP (ref 0–2)
BILIRUB SERPL-MCNC: 0.3 MG/DL — SIGNIFICANT CHANGE UP (ref 0.2–1.2)
BUN SERPL-MCNC: 67 MG/DL — HIGH (ref 7–23)
CALCIUM SERPL-MCNC: 7.2 MG/DL — LOW (ref 8.4–10.5)
CHLORIDE SERPL-SCNC: 97 MMOL/L — SIGNIFICANT CHANGE UP (ref 96–108)
CO2 SERPL-SCNC: 22 MMOL/L — SIGNIFICANT CHANGE UP (ref 22–31)
CREAT SERPL-MCNC: 4.45 MG/DL — HIGH (ref 0.5–1.3)
EOSINOPHIL # BLD AUTO: 0.1 K/UL — SIGNIFICANT CHANGE UP (ref 0–0.5)
EOSINOPHIL NFR BLD AUTO: 0.6 % — SIGNIFICANT CHANGE UP (ref 0–6)
GLUCOSE SERPL-MCNC: 283 MG/DL — HIGH (ref 70–99)
HCT VFR BLD CALC: 32.8 % — LOW (ref 39–50)
HGB BLD-MCNC: 10.8 G/DL — LOW (ref 13–17)
LDH SERPL L TO P-CCNC: 191 U/L — SIGNIFICANT CHANGE UP (ref 50–242)
LYMPHOCYTES # BLD AUTO: 0.6 K/UL — LOW (ref 1–3.3)
LYMPHOCYTES # BLD AUTO: 5.7 % — LOW (ref 13–44)
MAGNESIUM SERPL-MCNC: 2.2 MG/DL — SIGNIFICANT CHANGE UP (ref 1.6–2.6)
MCHC RBC-ENTMCNC: 31.5 PG — SIGNIFICANT CHANGE UP (ref 27–34)
MCHC RBC-ENTMCNC: 32.9 GM/DL — SIGNIFICANT CHANGE UP (ref 32–36)
MCV RBC AUTO: 95.8 FL — SIGNIFICANT CHANGE UP (ref 80–100)
MONOCYTES # BLD AUTO: 0.5 K/UL — SIGNIFICANT CHANGE UP (ref 0–0.9)
MONOCYTES NFR BLD AUTO: 5 % — SIGNIFICANT CHANGE UP (ref 2–14)
NEUTROPHILS # BLD AUTO: 8.6 K/UL — HIGH (ref 1.8–7.4)
NEUTROPHILS NFR BLD AUTO: 88.5 % — HIGH (ref 43–77)
PHOSPHATE SERPL-MCNC: 5.2 MG/DL — HIGH (ref 2.5–4.5)
PLATELET # BLD AUTO: 142 K/UL — LOW (ref 150–400)
POTASSIUM SERPL-MCNC: 4.2 MMOL/L — SIGNIFICANT CHANGE UP (ref 3.5–5.3)
POTASSIUM SERPL-SCNC: 4.2 MMOL/L — SIGNIFICANT CHANGE UP (ref 3.5–5.3)
PROT SERPL-MCNC: 6.7 G/DL — SIGNIFICANT CHANGE UP (ref 6–8.3)
RBC # BLD: 3.43 M/UL — LOW (ref 4.2–5.8)
RBC # FLD: 12.5 % — SIGNIFICANT CHANGE UP (ref 10.3–14.5)
SODIUM SERPL-SCNC: 135 MMOL/L — SIGNIFICANT CHANGE UP (ref 135–145)
TACROLIMUS SERPL-MCNC: 5.1 NG/ML — SIGNIFICANT CHANGE UP
WBC # BLD: 9.8 K/UL — SIGNIFICANT CHANGE UP (ref 3.8–10.5)
WBC # FLD AUTO: 9.8 K/UL — SIGNIFICANT CHANGE UP (ref 3.8–10.5)

## 2019-02-04 PROCEDURE — 99222 1ST HOSP IP/OBS MODERATE 55: CPT

## 2019-02-04 PROCEDURE — 99232 SBSQ HOSP IP/OBS MODERATE 35: CPT | Mod: GC,24

## 2019-02-04 PROCEDURE — 90945 DIALYSIS ONE EVALUATION: CPT | Mod: GC

## 2019-02-04 RX ORDER — INSULIN DETEMIR 100/ML (3)
25 INSULIN PEN (ML) SUBCUTANEOUS AT BEDTIME
Qty: 0 | Refills: 0 | Status: DISCONTINUED | OUTPATIENT
Start: 2019-02-04 | End: 2019-02-05

## 2019-02-04 RX ORDER — INSULIN DETEMIR 100/ML (3)
25 INSULIN PEN (ML) SUBCUTANEOUS
Qty: 0 | Refills: 0 | Status: DISCONTINUED | OUTPATIENT
Start: 2019-02-04 | End: 2019-02-04

## 2019-02-04 RX ORDER — TACROLIMUS 5 MG/1
6 CAPSULE ORAL
Qty: 0 | Refills: 0 | Status: DISCONTINUED | OUTPATIENT
Start: 2019-02-04 | End: 2019-02-06

## 2019-02-04 RX ORDER — INSULIN DETEMIR 100/ML (3)
30 INSULIN PEN (ML) SUBCUTANEOUS AT BEDTIME
Qty: 0 | Refills: 0 | Status: DISCONTINUED | OUTPATIENT
Start: 2019-02-04 | End: 2019-02-04

## 2019-02-04 RX ORDER — TACROLIMUS 5 MG/1
2 CAPSULE ORAL ONCE
Qty: 0 | Refills: 0 | Status: COMPLETED | OUTPATIENT
Start: 2019-02-04 | End: 2019-02-04

## 2019-02-04 RX ORDER — INSULIN DETEMIR 100/ML (3)
30 INSULIN PEN (ML) SUBCUTANEOUS
Qty: 0 | Refills: 0 | Status: DISCONTINUED | OUTPATIENT
Start: 2019-02-04 | End: 2019-02-06

## 2019-02-04 RX ORDER — INSULIN DETEMIR 100/ML (3)
25 INSULIN PEN (ML) SUBCUTANEOUS AT BEDTIME
Qty: 0 | Refills: 0 | Status: DISCONTINUED | OUTPATIENT
Start: 2019-02-04 | End: 2019-02-04

## 2019-02-04 RX ORDER — INSULIN LISPRO 100/ML
7 VIAL (ML) SUBCUTANEOUS
Qty: 0 | Refills: 0 | Status: DISCONTINUED | OUTPATIENT
Start: 2019-02-04 | End: 2019-02-06

## 2019-02-04 RX ADMIN — Medication 5 UNIT(S): at 08:29

## 2019-02-04 RX ADMIN — OXYCODONE HYDROCHLORIDE 10 MILLIGRAM(S): 5 TABLET ORAL at 22:05

## 2019-02-04 RX ADMIN — OXYCODONE HYDROCHLORIDE 10 MILLIGRAM(S): 5 TABLET ORAL at 14:07

## 2019-02-04 RX ADMIN — OXYCODONE HYDROCHLORIDE 10 MILLIGRAM(S): 5 TABLET ORAL at 15:08

## 2019-02-04 RX ADMIN — OXYCODONE HYDROCHLORIDE 10 MILLIGRAM(S): 5 TABLET ORAL at 18:08

## 2019-02-04 RX ADMIN — Medication 500000 UNIT(S): at 11:58

## 2019-02-04 RX ADMIN — OXYCODONE HYDROCHLORIDE 10 MILLIGRAM(S): 5 TABLET ORAL at 10:00

## 2019-02-04 RX ADMIN — Medication 10 MILLIGRAM(S): at 11:59

## 2019-02-04 RX ADMIN — OXYCODONE HYDROCHLORIDE 10 MILLIGRAM(S): 5 TABLET ORAL at 11:00

## 2019-02-04 RX ADMIN — TACROLIMUS 4 MILLIGRAM(S): 5 CAPSULE ORAL at 06:06

## 2019-02-04 RX ADMIN — Medication 500000 UNIT(S): at 06:06

## 2019-02-04 RX ADMIN — MYCOPHENOLATE MOFETIL 1 GRAM(S): 250 CAPSULE ORAL at 06:06

## 2019-02-04 RX ADMIN — ONDANSETRON 4 MILLIGRAM(S): 8 TABLET, FILM COATED ORAL at 02:20

## 2019-02-04 RX ADMIN — OXYCODONE HYDROCHLORIDE 10 MILLIGRAM(S): 5 TABLET ORAL at 19:27

## 2019-02-04 RX ADMIN — SENNA PLUS 2 TABLET(S): 8.6 TABLET ORAL at 22:04

## 2019-02-04 RX ADMIN — Medication 30 MILLIGRAM(S): at 18:11

## 2019-02-04 RX ADMIN — Medication 2: at 17:16

## 2019-02-04 RX ADMIN — Medication 25 UNIT(S): at 22:04

## 2019-02-04 RX ADMIN — OXYCODONE HYDROCHLORIDE 10 MILLIGRAM(S): 5 TABLET ORAL at 07:16

## 2019-02-04 RX ADMIN — Medication 25 UNIT(S): at 10:24

## 2019-02-04 RX ADMIN — OXYCODONE HYDROCHLORIDE 10 MILLIGRAM(S): 5 TABLET ORAL at 06:04

## 2019-02-04 RX ADMIN — Medication 2: at 12:38

## 2019-02-04 RX ADMIN — TACROLIMUS 2 MILLIGRAM(S): 5 CAPSULE ORAL at 15:43

## 2019-02-04 RX ADMIN — Medication 500000 UNIT(S): at 18:10

## 2019-02-04 RX ADMIN — Medication 7 UNIT(S): at 12:37

## 2019-02-04 RX ADMIN — Medication 3: at 08:30

## 2019-02-04 RX ADMIN — Medication 30 MILLIGRAM(S): at 06:05

## 2019-02-04 RX ADMIN — MYCOPHENOLATE MOFETIL 1 GRAM(S): 250 CAPSULE ORAL at 18:12

## 2019-02-04 RX ADMIN — Medication 1 TABLET(S): at 11:58

## 2019-02-04 RX ADMIN — FAMOTIDINE 20 MILLIGRAM(S): 10 INJECTION INTRAVENOUS at 11:57

## 2019-02-04 RX ADMIN — OXYCODONE HYDROCHLORIDE 10 MILLIGRAM(S): 5 TABLET ORAL at 23:03

## 2019-02-04 RX ADMIN — Medication 7 UNIT(S): at 17:16

## 2019-02-04 RX ADMIN — Medication 100 MILLIGRAM(S): at 18:11

## 2019-02-04 RX ADMIN — TACROLIMUS 6 MILLIGRAM(S): 5 CAPSULE ORAL at 18:10

## 2019-02-04 RX ADMIN — VALGANCICLOVIR 450 MILLIGRAM(S): 450 TABLET, FILM COATED ORAL at 08:44

## 2019-02-04 RX ADMIN — Medication 100 MILLIGRAM(S): at 06:04

## 2019-02-04 NOTE — PROGRESS NOTE ADULT - PROBLEM SELECTOR PLAN 4
BG remains elevated.   - Increase levemir to 25units BID  - Increase insulin premeal to 7 units TID.  - C/w ISS.  - Monitor BG

## 2019-02-04 NOTE — PATIENT PROFILE ADULT - HAS THE PATIENT RECEIVED THE INFLUENZA VACCINE THIS SEASON?
January 31, 2018      Michael Olmstead MD  [address]  [facility] [address]           [facility]  [address]  [facility] [address]  Phone: [phone]  Fax: [phone]          Patient: Rochelle Reed   MR Number: [id number]   YOB: 1954   Date of Visit: 1/31/2018       Dear Dr. Michael Olmstead:    Thank you for referring Rochelle Reed to me for evaluation. Attached you will find relevant portions of my assessment and plan of care.    If you have questions, please do not hesitate to call me. I look forward to following Rochelle Reed along with you.    Sincerely,    Han Hidalgo MD    Enclosure  CC:  No Recipients    If you would like to receive this communication electronically, please contact [email or web address] or [phone] to request more information on [facility] Link access.    For providers and/or their staff who would like to refer a patient to [facility], please contact us through our one-stop-shop provider referral line, [facility], at [phone].    If you feel you have received this communication in error or would no longer like to receive these types of communications, please e-mail [email or web address]          yes...

## 2019-02-04 NOTE — PROGRESS NOTE ADULT - ASSESSMENT
72yoM with ESRD on PD, hx of morbid obesity s/p RNYGB, DM2, and CAD/MI is admitted s/p directed DDRT on 2/1/19.

## 2019-02-04 NOTE — CONSULT NOTE ADULT - SUBJECTIVE AND OBJECTIVE BOX
[facility] DIVISION OF KIDNEY DISEASES AND HYPERTENSION -- INITIAL CONSULT NOTE  --------------------------------------------------------------------------------  HPI: 73 yo M with ESRD on PD, hx of morbid obesity s/p RNYGB, HTN, DM2, CAD/MI, hypothyroidism, and nephrolithiasis is admitted s/p directed DDRT completed today (2/1/19). Nephrology consulted for management of immunosuppression.     Patient seen and examined at bedside in PACU. Patient states that he followed with Dr. Jones for nephrology history. States that he had a renal biopsy several years ago that showed chronic scarring. Has been tolerating PD, last fill yesterday, drained prior to DDRT. Currently denies CP, SOB, N/V/F/C.     CPRA: 0%   [employer] Donor ID: [id number]  Match: [id number]  OPO:  [place] on [place]  Donor Age: 59  ABO:  O  KDPI: 81%  COD: anoxia – drug intoxication  X Clamp Time : 01/31/2019 16:32  Medical Hx: Asthma, Depression, drug use (cocaine), ETOH  Terminal Scr ~2.8-3.0  CMV- /EBV +    PAST HISTORY  --------------------------------------------------------------------------------  PAST MEDICAL & SURGICAL HISTORY:  DM2 (diabetes mellitus, type 2)  Morbid obesity  Hypothyroidism  ESRD on peritoneal dialysis  HLD (hyperlipidemia)  History of partial gastrectomy  History of cholecystectomy  History of Porsha-en-Y gastric bypass    FAMILY HISTORY:  Denies family history of kidney disease    PAST SOCIAL HISTORY:  Denies alcohol/tobacco use    ALLERGIES & MEDICATIONS  --------------------------------------------------------------------------------  Allergies    Lipitor (Hepatotoxicity)    Intolerances    Standing Inpatient Medications  dextrose 5%. 1000 milliLiter(s) IV Continuous <Continuous>  dextrose 50% Injectable 12.5 Gram(s) IV Push once  dextrose 50% Injectable 25 Gram(s) IV Push once  dextrose 50% Injectable 25 Gram(s) IV Push once  HYDROmorphone  Injectable 0.5 milliGRAM(s) IV Push once  HYDROmorphone PCA (1 mG/mL) 30 milliLiter(s) PCA Continuous PCA Continuous  insulin lispro (HumaLOG) corrective regimen sliding scale   SubCutaneous Before meals and at bedtime  sodium chloride 0.9%. 1000 milliLiter(s) IV Continuous <Continuous>  sodium chloride 0.9%. 1000 milliLiter(s) IV Continuous <Continuous>  tacrolimus 2 milliGRAM(s) Oral every 12 hours    REVIEW OF SYSTEMS  --------------------------------------------------------------------------------  Gen: No fatigue  Respiratory: No dyspnea  CV: No chest pain  GI: + abdominal pain  MSK: No LE edema  Neuro: No dizziness  Heme: No bleeding  Psych: No nervousness  Skin: No rashes    All other systems were reviewed and are negative, except as noted.    VITALS/PHYSICAL EXAM  --------------------------------------------------------------------------------  T(C): 35.7 (02-01-19 @ 11:45), Max: 36.6 (02-01-19 @ 04:26)  HR: 89 (02-01-19 @ 11:45) (87 - 89)  BP: 130/59 (02-01-19 @ 11:45) (118/76 - 130/59)  RR: 18 (02-01-19 @ 11:45) (16 - 18)  SpO2: 96% (02-01-19 @ 11:45) (94% - 96%)  Wt(kg): --  Height (cm): 180.34 (02-01-19 @ 05:57)  Weight (kg): 111.6 (02-01-19 @ 05:57)  BMI (kg/m2): 34.3 (02-01-19 @ 05:57)  BSA (m2): 2.3 (02-01-19 @ 05:57)    01-31-19 @ 07:01  -  02-01-19 @ 07:00  --------------------------------------------------------  IN: 0 mL / OUT: 0 mL / NET: 0 mL    Physical Exam:  	Gen: Shallow breathing  	HEENT: Supple neck, eyes closed  	Pulm: Shallow breathing, poor effort  	CV: RRR, S1S2; no rub  	Abd: +BS, + unsaturated surgical dressing, + PD catheter  	: No suprapubic tenderness  	LE: No edema  	Skin: Warm, without rashes  	Psych: + sedated post-anesthesia  	Vascular Access: L AVF +thrill/+bruit  LABS/STUDIES  --------------------------------------------------------------------------------              11.2   7.9   >-----------<  194      [02-01-19 @ 12:38]              33.6     137  |  106  |  44  ----------------------------<  245      [02-01-19 @ 12:38]  3.8   |  19  |  4.52        Ca     7.7     [02-01-19 @ 12:38]      Mg     1.8     [02-01-19 @ 12:38]      Phos  4.3     [02-01-19 @ 12:38]    Creatinine Trend:  SCr 4.52 [02-01 @ 12:38]  SCr 4.56 [02-01 @ 04:55]
ID CONSULTATION--Art Tyler MD  Pager [phone]    The patient has ESRD on PD>  Underwent DDRT 2/1.  Post-transplant data revealed donor with UC 5000 CFU GNRs---not identified---UA unremarkable.  BC in 1/2 bottles with Strep Salivarius---collected 1/23 and growth 4+ days later.  BC before and after (including 1/30 were neg).  Sputum with MSSA.  Recipient has received 3 days of ceftriaxone---now off of abx.    No complaints.    PAST MEDICAL & SURGICAL HISTORY:  DM2 (diabetes mellitus, type 2)  Morbid obesity  Hypothyroidism  ESRD on peritoneal dialysis  HLD (hyperlipidemia)  History of partial gastrectomy  History of cholecystectomy  History of Porsha-en-Y gastric bypass    SOCIAL: lives alone  no pets  no travel    FAMILY HISTORY: dm    REVIEW OF SYSTEMS  General: Denies any malaise fatigue or chills. Fevers absent  Skin:No rash	  Ophthalmologic:Denies any visual complaints,discharge redness or photophobia  ENMT:No nasal discharge,headache,sinus congestion or throat pain.No dental complaints  Respiratory and Thorax:No cough,sputum or chest pain.Denies shortness of breath	  Cardiovascular:	No chest pain,palpitaions or dizziness  Gastrointestinal:	NO nausea,abdominal pain or diarrhea.  Genitourinary:	No dysuria,frequency. No flank pain  Musculoskeletal:	No joint swelling or pain.No weakness  Neurological:No confusion,diziness.No extremity weakness.No bladder or bowel incontinence	  Psychiatric:No delusions or hallucinations	  Hematology/Lymphatics:	No LN swelling.No gum bleeding     Allergies  Lipitor (Hepatotoxicity)    ANTIMICROBIALS:  nystatin    Suspension 546179 Unit(s) Swish and Swallow four times a day  trimethoprim   80 mG/sulfamethoxazole 400 mG 1 Tablet(s) Oral daily  valGANciclovir 450 milliGRAM(s) Oral <User Schedule>    Vital Signs Last 24 Hrs  T(C): 36.4 (04 Feb 2019 11:09), Max: 36.9 (03 Feb 2019 21:07)  T(F): 97.5 (04 Feb 2019 11:09), Max: 98.4 (03 Feb 2019 21:07)  HR: 80 (04 Feb 2019 11:09) (72 - 96)  BP: 121/64 (04 Feb 2019 11:09) (119/68 - 138/75)  BP(mean): --  RR: 18 (04 Feb 2019 11:09) (18 - 20)  SpO2: 97% (04 Feb 2019 11:09) (93% - 97%)    PHYSICAL EXAM:Pleasant patient in no acute distress.  Constitutional:Comfortable.Awake and alert  No cachexia   Eyes:PERRL EOMI.NO discharge or conjunctival injection  ENMT:No sinus tenderness.No thrush.No pharyngeal exudate or erythema.Fair dental hygiene  Neck:Supple,No LN,no JVD  Respiratory:Good air entry bilaterally,CTA  Cardiovascular:S1 S2 wnl, No murmurs,rub or gallops  Gastrointestinal:Soft BS(+) no tenderness no masses ,No rebound or guarding  Genitourinary:No pain over kidney  Extremities:No cyanosis,clubbing or edema.  Neurological:AAO X 3,No grossly focal deficits  Skin:No rash   Lymph Nodes:No palpable LNs  Musculoskeletal:No joint swelling or LOM  Psychiatric:Affect normal.                     10.8   9.8   )-----------( 142      ( 04 Feb 2019 06:11 )             32.8     02-04    135  |  97  |  67<H>  ----------------------------<  283<H>  4.2   |  22  |  4.45<H>    Ca    7.2<L>      04 Feb 2019 06:11  Phos  5.2     02-04  Mg     2.2     02-04    TPro  6.7  /  Alb  3.1<L>  /  TBili  0.3  /  DBili  x   /  AST  16  /  ALT  10  /  AlkPhos  84  02-04      RECENT CULTURES:  02-03 @ 08:43  .Blood Blood-Peripheral  --  No growth to date.  --  02-03 @ 01:05  .Blood Blood-Peripheral  --No growth to date.  --  IMPRESSION:  The patient is pod 3 from DDRT.  Donor with Strep salivarius in blood in 1/2 bottles.  It seems to have taken over 3 days to have growth in this one bottle, and cultures collected before and after were sterile.  Most likely, this was a procurement contaminant.  A single bottle of an alpha-Strep that takes several days for growth is more likely than not, a contaminant.  This is especially so, with all of the other negative BC.  With an abundance of caution 3 days ceftriaxone was administered.    At this point, I do not believe there to be an indication for ongoing abx.    Please call if any further questions/issues.

## 2019-02-04 NOTE — PROGRESS NOTE ADULT - SUBJECTIVE AND OBJECTIVE BOX
Transplant Surgery - Multidisciplinary Rounds  --------------------------------------------------------------  Directed donation DDRT    Date: 2/1/19        POD# 3    Present:  Patient seen with multidisciplinary team icluding (Transplant Surgeon: Dr. Herbert. Transplant Nephrologist: Dr. Urbano, renal Fellow Dr. Ruff, Pharmacist: Jesus Barnett, RUTH Garnett, RN, and RN manager Antonia in am rounds and examined with Dr. Rolle.  Disciplines not in attendance will be notified of the plan.     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  Pt [facility] as a direct admit for DDRT. Pt states he instilled 1 bag of PD fluid at 10PM PTA.  Pt continues to make urine.  The patient denies fever, chills; chest pain, SOB, palpitation; dizziness, weakness; nausea, vomiting; diarrhea, constipation; abdominal pain; dysuria, hematuria; leg swelling; sick contacts; and recent travel.  Now s/p directed donor renal transplant on 2/1/2019.  2 renal arteries anastomosed to iliac via carrel patch, 1 vein, 1 ureter.   Recipient:  PRA: 0%  ABO: O last HD: PD 22:00 (1/31)  Donor: [facility] donor incarcerated COD: Drug overdose, anoxia. Elevated terminal creatinine in donor kidney.  Donor ID: [id number]  58y/o  CMV - /EBV + right kidney ABO: O KDPI: 81% cross clamp time: 01/31/2019 16:32hours. Post op HD x1 without fluid removal and continue on PD.    Interval Events:  Tolerating CHO restricted diet.  Hyperglycemic.  Levemir increased to BID 18units yesterday. +Flatus. Pain well controlled with PRN.  Ceftriaxone for positive donor culture x 3 days completed yesterday.  Ambulating    Potential Discharge date:  2/6/19    Education:  Medications    Plan of care:  See Below      MEDICATIONS  (STANDING):  bisacodyl Suppository 10 milliGRAM(s) Rectal once  dextrose 5%. 1000 milliLiter(s) (50 mL/Hr) IV Continuous <Continuous>  dextrose 50% Injectable 12.5 Gram(s) IV Push once  dextrose 50% Injectable 25 Gram(s) IV Push once  dextrose 50% Injectable 25 Gram(s) IV Push once  docusate sodium 100 milliGRAM(s) Oral two times a day  famotidine    Tablet 20 milliGRAM(s) Oral daily  insulin detemir injectable (LEVEMIR) 25 Unit(s) SubCutaneous at bedtime  insulin detemir injectable (LEVEMIR) 25 Unit(s) SubCutaneous before breakfast  insulin lispro (HumaLOG) corrective regimen sliding scale   SubCutaneous Before meals and at bedtime  insulin lispro Injectable (HumaLOG) 7 Unit(s) SubCutaneous three times a day before meals  methylPREDNISolone sodium succinate Injectable 30 milliGRAM(s) IV Push two times a day  methylPREDNISolone sodium succinate Injectable   IV Push   mycophenolate mofetil 1 Gram(s) Oral every 12 hours  nystatin    Suspension [id number] Unit(s) Swish and Swallow four times a day  senna 2 Tablet(s) Oral at bedtime  tacrolimus 4 milliGRAM(s) Oral two times a day  trimethoprim   80 mG/sulfamethoxazole 400 mG 1 Tablet(s) Oral daily  valGANciclovir 450 milliGRAM(s) Oral <User Schedule>    MEDICATIONS  (PRN):  acetaminophen   Tablet .. 650 milliGRAM(s) Oral every 6 hours PRN Mild Pain (1 - 3)  dextrose 40% Gel 15 Gram(s) Oral once PRN Blood Glucose LESS THAN 70 milliGRAM(s)/deciliter  glucagon  Injectable 1 milliGRAM(s) IntraMuscular once PRN Glucose LESS THAN 70 milligrams/deciliter  ondansetron Injectable 4 milliGRAM(s) IV Push every 6 hours PRN Nausea and/or Vomiting  oxyCODONE    IR 5 milliGRAM(s) Oral every 4 hours PRN Moderate Pain (4 - 6)  oxyCODONE    IR 10 milliGRAM(s) Oral every 4 hours PRN Severe Pain (7 - 10)      PAST MEDICAL & SURGICAL HISTORY:  DM2 (diabetes mellitus, type 2)  Morbid obesity  Hypothyroidism  ESRD on peritoneal dialysis  HLD (hyperlipidemia)  History of partial gastrectomy  History of cholecystectomy  History of Porsha-en-Y gastric bypass      Vital Signs Last 24 Hrs  T(C): 36.6 (04 Feb 2019 05:51), Max: 36.9 (03 Feb 2019 10:44)  T(F): 97.8 (04 Feb 2019 05:51), Max: 98.4 (03 Feb 2019 10:44)  HR: 75 (04 Feb 2019 05:51) (72 - 99)  BP: 138/75 (04 Feb 2019 05:51) (119/68 - 138/75)  BP(mean): --  RR: 18 (04 Feb 2019 05:51) (18 - 20)  SpO2: 97% (04 Feb 2019 05:51) (93% - 97%)    I&O's Summary    03 Feb 2019 07:01  -  04 Feb 2019 07:00  --------------------------------------------------------  IN: 1100 mL / OUT: 1170 mL / NET: -70 mL                              10.8   9.8   )-----------( 142      ( 04 Feb 2019 06:11 )             32.8     02-04    135  |  97  |  67<H>  ----------------------------<  283<H>  4.2   |  22  |  4.45<H>    Ca    7.2<L>      04 Feb 2019 06:11  Phos  5.2     02-04  Mg     2.2     02-04    TPro  6.7  /  Alb  3.1<L>  /  TBili  0.3  /  DBili  x   /  AST  16  /  ALT  10  /  AlkPhos  84  02-04    Tacrolimus (), Serum: 3.3 ng/mL (02-03 @ 08:14)      Review of systems  Gen: No weight changes, fatigue, fevers/chills, weakness  Skin: No rashes  Head/Eyes/Ears/Mouth: No headache; Normal hearing; Normal vision w/o blurriness; No sinus pain/discomfort, sore throat  Respiratory: No dyspnea, cough, wheezing, hemoptysis  CV: No chest pain, PND, orthopnea  GI: C/O moderate abdominal pain at surgical site. Denies diarrhea, constipation, nausea, vomiting, melena, hematochezia  : No increased frequency, dysuria, hematuria, nocturia  MSK: No joint pain/swelling; no back pain; some generalized edema  Neuro: No dizziness/lightheadedness, weakness, seizures, numbness, tingling  Heme: No easy bruising or bleeding  Endo: No heat/cold intolerance  Psych: No significant nervousness, anxiety, stress, depression  All other systems were reviewed and are negative, except as noted.      PHYSICAL EXAM:  Constitutional: Well developed / well nourished  Eyes: Anicteric, PERRLA  ENMT: nc/at  Neck: supple  Respiratory: CTA B/L  Cardiovascular: RRR  Gastrointestinal: Soft abdomen, ND, mild tender to touch at surgical site, +BS  Genitourinary: Urinary catheter in place, PD catheter in use  Extremities: SCD's in place and working bilaterally, trace BLE edema, + R knee swelling (stable since knee replacement)  Vascular: Palpable dp pulses bilaterally  Neurological: A&O x3  Skin: Mild serosanguinous ronn on wound dressing, no erythema and evidence of infection noted  Musculoskeletal: Moving all extremities  Psychiatric: Responsive

## 2019-02-04 NOTE — PROGRESS NOTE ADULT - SUBJECTIVE AND OBJECTIVE BOX
[facility] DIVISION OF KIDNEY DISEASES AND HYPERTENSION -- FOLLOW UP NOTE  --------------------------------------------------------------------------------  Chief Complaint:  renal transplant    24 hour events/subjective:  Having some gas pains. No SOB.      PAST HISTORY  --------------------------------------------------------------------------------  No significant changes to PMH, PSH, FHx, SHx, unless otherwise noted    ALLERGIES & MEDICATIONS  --------------------------------------------------------------------------------  Allergies    Lipitor (Hepatotoxicity)    Intolerances      Standing Inpatient Medications  bisacodyl Suppository 10 milliGRAM(s) Rectal once  dextrose 5%. 1000 milliLiter(s) IV Continuous <Continuous>  dextrose 50% Injectable 12.5 Gram(s) IV Push once  dextrose 50% Injectable 25 Gram(s) IV Push once  dextrose 50% Injectable 25 Gram(s) IV Push once  docusate sodium 100 milliGRAM(s) Oral two times a day  famotidine    Tablet 20 milliGRAM(s) Oral daily  insulin detemir injectable (LEVEMIR) 25 Unit(s) SubCutaneous at bedtime  insulin detemir injectable (LEVEMIR) 25 Unit(s) SubCutaneous before breakfast  insulin lispro (HumaLOG) corrective regimen sliding scale   SubCutaneous Before meals and at bedtime  insulin lispro Injectable (HumaLOG) 7 Unit(s) SubCutaneous three times a day before meals  methylPREDNISolone sodium succinate Injectable 30 milliGRAM(s) IV Push two times a day  methylPREDNISolone sodium succinate Injectable   IV Push   mycophenolate mofetil 1 Gram(s) Oral every 12 hours  nystatin    Suspension [id number] Unit(s) Swish and Swallow four times a day  senna 2 Tablet(s) Oral at bedtime  tacrolimus 4 milliGRAM(s) Oral two times a day  trimethoprim   80 mG/sulfamethoxazole 400 mG 1 Tablet(s) Oral daily  valGANciclovir 450 milliGRAM(s) Oral <User Schedule>    PRN Inpatient Medications  acetaminophen   Tablet .. 650 milliGRAM(s) Oral every 6 hours PRN  dextrose 40% Gel 15 Gram(s) Oral once PRN  glucagon  Injectable 1 milliGRAM(s) IntraMuscular once PRN  ondansetron Injectable 4 milliGRAM(s) IV Push every 6 hours PRN  oxyCODONE    IR 5 milliGRAM(s) Oral every 4 hours PRN  oxyCODONE    IR 10 milliGRAM(s) Oral every 4 hours PRN      REVIEW OF SYSTEMS  --------------------------------------------------------------------------------  Gen: no fatigue  Respiratory: No dyspnea  CV: No chest pain  GI: No abdominal pain  MSK: No joint pain    VITALS/PHYSICAL EXAM  --------------------------------------------------------------------------------  T(C): 36.6 (02-04-19 @ 05:51), Max: 36.9 (02-03-19 @ 21:07)  HR: 75 (02-04-19 @ 05:51) (72 - 96)  BP: 138/75 (02-04-19 @ 05:51) (119/68 - 138/75)  RR: 18 (02-04-19 @ 05:51) (18 - 20)  SpO2: 97% (02-04-19 @ 05:51) (93% - 97%)  Wt(kg): --        02-03-19 @ 07:01  -  02-04-19 @ 07:00  --------------------------------------------------------  IN: 1100 mL / OUT: 1170 mL / NET: -70 mL    02-04-19 @ 07:01  -  02-04-19 @ 10:44  --------------------------------------------------------  IN: 0 mL / OUT: 115 mL / NET: -115 mL      Physical Exam:  	Gen: NAD  	Pulm: CTA B/L  	CV: RRR, S1S2; no rub  	Abd: +BS, soft, nontender/nondistended  	LE: Warm, mild edema  	Neuro: follows commands  	Psych: Normal affect and mood  	Skin: Warm, without rashes  	Vascular access: +PD catheter in situ    LABS/STUDIES  --------------------------------------------------------------------------------              10.8   9.8   >-----------<  142      [02-04-19 @ 06:11]              32.8     135  |  97  |  67  ----------------------------<  283      [02-04-19 @ 06:11]  4.2   |  22  |  4.45        Ca     7.2     [02-04-19 @ 06:11]      Mg     2.2     [02-04-19 @ 06:11]      Phos  5.2     [02-04-19 @ 06:11]    TPro  6.7  /  Alb  3.1  /  TBili  0.3  /  DBili  x   /  AST  16  /  ALT  10  /  AlkPhos  84  [02-04-19 @ 06:11]              [02-04-19 @ 06:11]    Creatinine Trend:  SCr 4.45 [02-04 @ 06:11]  SCr 4.17 [02-03 @ 06:13]  SCr 5.08 [02-02 @ 04:55]  SCr 4.76 [02-01 @ 18:05]  SCr 4.52 [02-01 @ 12:38]        HbA1c 6.2      [02-02-19 @ 08:28]    HBsAb 6.8      [02-02-19 @ 20:46]  HBsAg Nonreact      [02-02-19 @ 20:46]  HBcAb Nonreact      [02-02-19 @ 20:46]  HCV 0.14, Nonreact      [02-02-19 @ 20:46]

## 2019-02-05 ENCOUNTER — CHART COPY (OUTPATIENT)
Age: 74
End: 2019-02-05

## 2019-02-05 LAB
ALBUMIN SERPL ELPH-MCNC: 3.1 G/DL — LOW (ref 3.3–5)
ALP SERPL-CCNC: 91 U/L — SIGNIFICANT CHANGE UP (ref 40–120)
ALT FLD-CCNC: 23 U/L — SIGNIFICANT CHANGE UP (ref 10–45)
ANION GAP SERPL CALC-SCNC: 15 MMOL/L — SIGNIFICANT CHANGE UP (ref 5–17)
AST SERPL-CCNC: 27 U/L — SIGNIFICANT CHANGE UP (ref 10–40)
BASOPHILS # BLD AUTO: 0 K/UL — SIGNIFICANT CHANGE UP (ref 0–0.2)
BASOPHILS NFR BLD AUTO: 0.2 % — SIGNIFICANT CHANGE UP (ref 0–2)
BILIRUB SERPL-MCNC: 0.4 MG/DL — SIGNIFICANT CHANGE UP (ref 0.2–1.2)
BUN SERPL-MCNC: 76 MG/DL — HIGH (ref 7–23)
CALCIUM SERPL-MCNC: 7.2 MG/DL — LOW (ref 8.4–10.5)
CHLORIDE SERPL-SCNC: 101 MMOL/L — SIGNIFICANT CHANGE UP (ref 96–108)
CO2 SERPL-SCNC: 22 MMOL/L — SIGNIFICANT CHANGE UP (ref 22–31)
CREAT SERPL-MCNC: 4.51 MG/DL — HIGH (ref 0.5–1.3)
EOSINOPHIL # BLD AUTO: 0 K/UL — SIGNIFICANT CHANGE UP (ref 0–0.5)
EOSINOPHIL NFR BLD AUTO: 0.3 % — SIGNIFICANT CHANGE UP (ref 0–6)
GLUCOSE SERPL-MCNC: 236 MG/DL — HIGH (ref 70–99)
HCT VFR BLD CALC: 30.5 % — LOW (ref 39–50)
HGB BLD-MCNC: 9.8 G/DL — LOW (ref 13–17)
LDH SERPL L TO P-CCNC: 226 U/L — SIGNIFICANT CHANGE UP (ref 50–242)
LYMPHOCYTES # BLD AUTO: 1.2 K/UL — SIGNIFICANT CHANGE UP (ref 1–3.3)
LYMPHOCYTES # BLD AUTO: 11.9 % — LOW (ref 13–44)
MAGNESIUM SERPL-MCNC: 2.4 MG/DL — SIGNIFICANT CHANGE UP (ref 1.6–2.6)
MCHC RBC-ENTMCNC: 30.7 PG — SIGNIFICANT CHANGE UP (ref 27–34)
MCHC RBC-ENTMCNC: 32 GM/DL — SIGNIFICANT CHANGE UP (ref 32–36)
MCV RBC AUTO: 95.8 FL — SIGNIFICANT CHANGE UP (ref 80–100)
MONOCYTES # BLD AUTO: 0.6 K/UL — SIGNIFICANT CHANGE UP (ref 0–0.9)
MONOCYTES NFR BLD AUTO: 6.4 % — SIGNIFICANT CHANGE UP (ref 2–14)
NEUTROPHILS # BLD AUTO: 8 K/UL — HIGH (ref 1.8–7.4)
NEUTROPHILS NFR BLD AUTO: 81.2 % — HIGH (ref 43–77)
PHOSPHATE SERPL-MCNC: 5.4 MG/DL — HIGH (ref 2.5–4.5)
PLATELET # BLD AUTO: 174 K/UL — SIGNIFICANT CHANGE UP (ref 150–400)
POTASSIUM SERPL-MCNC: 4.3 MMOL/L — SIGNIFICANT CHANGE UP (ref 3.5–5.3)
POTASSIUM SERPL-SCNC: 4.3 MMOL/L — SIGNIFICANT CHANGE UP (ref 3.5–5.3)
PROT SERPL-MCNC: 6.8 G/DL — SIGNIFICANT CHANGE UP (ref 6–8.3)
RBC # BLD: 3.18 M/UL — LOW (ref 4.2–5.8)
RBC # FLD: 12 % — SIGNIFICANT CHANGE UP (ref 10.3–14.5)
SODIUM SERPL-SCNC: 138 MMOL/L — SIGNIFICANT CHANGE UP (ref 135–145)
TACROLIMUS SERPL-MCNC: 9.8 NG/ML — SIGNIFICANT CHANGE UP
WBC # BLD: 9.9 K/UL — SIGNIFICANT CHANGE UP (ref 3.8–10.5)
WBC # FLD AUTO: 9.9 K/UL — SIGNIFICANT CHANGE UP (ref 3.8–10.5)

## 2019-02-05 PROCEDURE — 99232 SBSQ HOSP IP/OBS MODERATE 35: CPT | Mod: GC

## 2019-02-05 PROCEDURE — 99232 SBSQ HOSP IP/OBS MODERATE 35: CPT | Mod: GC,24

## 2019-02-05 RX ORDER — COLESEVELAM HYDROCHLORIDE 625 MG/1
625 TABLET, FILM COATED ORAL TWICE DAILY
Refills: 0 | Status: DISCONTINUED | COMMUNITY
End: 2019-02-05

## 2019-02-05 RX ORDER — EZETIMIBE 10 MG/1
10 TABLET ORAL DAILY
Qty: 30 | Refills: 5 | Status: DISCONTINUED | COMMUNITY
End: 2019-02-05

## 2019-02-05 RX ORDER — SITAGLIPTIN 25 MG/1
25 TABLET, FILM COATED ORAL DAILY
Refills: 0 | Status: DISCONTINUED | COMMUNITY
End: 2019-02-05

## 2019-02-05 RX ORDER — GLIMEPIRIDE 4 MG/1
4 TABLET ORAL TWICE DAILY
Refills: 0 | Status: DISCONTINUED | COMMUNITY
End: 2019-02-05

## 2019-02-05 RX ORDER — INSULIN DETEMIR 100/ML (3)
25 INSULIN PEN (ML) SUBCUTANEOUS AT BEDTIME
Qty: 0 | Refills: 0 | Status: DISCONTINUED | OUTPATIENT
Start: 2019-02-05 | End: 2019-02-06

## 2019-02-05 RX ORDER — LACTULOSE 10 G/15ML
20 SOLUTION ORAL ONCE
Qty: 0 | Refills: 0 | Status: COMPLETED | OUTPATIENT
Start: 2019-02-05 | End: 2019-02-05

## 2019-02-05 RX ORDER — INSULIN DETEMIR 100 [IU]/ML
100 INJECTION, SOLUTION SUBCUTANEOUS DAILY
Refills: 0 | Status: DISCONTINUED | COMMUNITY
End: 2019-02-05

## 2019-02-05 RX ORDER — INSULIN DETEMIR 100/ML (3)
30 INSULIN PEN (ML) SUBCUTANEOUS AT BEDTIME
Qty: 0 | Refills: 0 | Status: DISCONTINUED | OUTPATIENT
Start: 2019-02-05 | End: 2019-02-05

## 2019-02-05 RX ADMIN — Medication 7 UNIT(S): at 08:14

## 2019-02-05 RX ADMIN — OXYCODONE HYDROCHLORIDE 10 MILLIGRAM(S): 5 TABLET ORAL at 12:31

## 2019-02-05 RX ADMIN — MYCOPHENOLATE MOFETIL 1 GRAM(S): 250 CAPSULE ORAL at 06:00

## 2019-02-05 RX ADMIN — Medication 500000 UNIT(S): at 12:24

## 2019-02-05 RX ADMIN — Medication 1 TABLET(S): at 12:25

## 2019-02-05 RX ADMIN — TACROLIMUS 6 MILLIGRAM(S): 5 CAPSULE ORAL at 17:52

## 2019-02-05 RX ADMIN — Medication 100 MILLIGRAM(S): at 06:00

## 2019-02-05 RX ADMIN — LACTULOSE 20 GRAM(S): 10 SOLUTION ORAL at 10:17

## 2019-02-05 RX ADMIN — Medication 1: at 17:48

## 2019-02-05 RX ADMIN — FAMOTIDINE 20 MILLIGRAM(S): 10 INJECTION INTRAVENOUS at 12:27

## 2019-02-05 RX ADMIN — OXYCODONE HYDROCHLORIDE 10 MILLIGRAM(S): 5 TABLET ORAL at 20:09

## 2019-02-05 RX ADMIN — Medication 500000 UNIT(S): at 05:59

## 2019-02-05 RX ADMIN — OXYCODONE HYDROCHLORIDE 10 MILLIGRAM(S): 5 TABLET ORAL at 03:21

## 2019-02-05 RX ADMIN — Medication 100 MILLIGRAM(S): at 17:49

## 2019-02-05 RX ADMIN — OXYCODONE HYDROCHLORIDE 10 MILLIGRAM(S): 5 TABLET ORAL at 04:05

## 2019-02-05 RX ADMIN — Medication 25 UNIT(S): at 22:03

## 2019-02-05 RX ADMIN — OXYCODONE HYDROCHLORIDE 10 MILLIGRAM(S): 5 TABLET ORAL at 07:47

## 2019-02-05 RX ADMIN — Medication 20 MILLIGRAM(S): at 06:00

## 2019-02-05 RX ADMIN — OXYCODONE HYDROCHLORIDE 10 MILLIGRAM(S): 5 TABLET ORAL at 21:23

## 2019-02-05 RX ADMIN — Medication 30 UNIT(S): at 08:12

## 2019-02-05 RX ADMIN — OXYCODONE HYDROCHLORIDE 10 MILLIGRAM(S): 5 TABLET ORAL at 08:20

## 2019-02-05 RX ADMIN — Medication 7 UNIT(S): at 17:48

## 2019-02-05 RX ADMIN — BASILIXIMAB 100 MILLIGRAM(S): 20 INJECTION, POWDER, FOR SOLUTION INTRAVENOUS at 13:22

## 2019-02-05 RX ADMIN — SENNA PLUS 2 TABLET(S): 8.6 TABLET ORAL at 21:39

## 2019-02-05 RX ADMIN — Medication 7 UNIT(S): at 12:26

## 2019-02-05 RX ADMIN — TACROLIMUS 6 MILLIGRAM(S): 5 CAPSULE ORAL at 05:59

## 2019-02-05 RX ADMIN — OXYCODONE HYDROCHLORIDE 10 MILLIGRAM(S): 5 TABLET ORAL at 16:19

## 2019-02-05 RX ADMIN — Medication 2: at 12:25

## 2019-02-05 RX ADMIN — Medication 500000 UNIT(S): at 17:51

## 2019-02-05 RX ADMIN — MYCOPHENOLATE MOFETIL 1 GRAM(S): 250 CAPSULE ORAL at 17:53

## 2019-02-05 RX ADMIN — Medication 20 MILLIGRAM(S): at 17:50

## 2019-02-05 RX ADMIN — Medication 2: at 08:13

## 2019-02-05 NOTE — PROGRESS NOTE ADULT - SUBJECTIVE AND OBJECTIVE BOX
Transplant Surgery - Multidisciplinary Rounds  --------------------------------------------------------------  Directed donation DDRT    Date: 2/1/19        POD# 4    Present:  Patient seen with multidisciplinary team icluding (Transplant Surgeon: Dr. Franklin, Dr. Omer, Dr. Napoles, Dr. Herbert, third year resident MAGO Capps. Transplant Nephrologist: Dr. Urbano, Dr. Waggoner, nephrology fellow NASH Ruff, Transplant Coordinators: Jonathan Casey, Charlene Block, Jamari (Voula) Irais Garcia. Social Work:  Brittny Cross, Olivia Pedraza. Tricia Arthur (Living Donor SW/NOAH). Pharmacist: Jesus Barnett, pharmacy resident and student. Nutrition: Dina Canela. NP Yee Garnett, NP director Amy Nieves, RN, and RN manager in am rounds and examined with Dr. Herbert. Disciplines not in attendance will be notified of the plan.     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  Pt presents to [facility] as a direct admit for DDRT. Pt states he instilled 1 bag of PD fluid at 10PM PTA.  Pt continues to make urine.  The patient denies fever, chills; chest pain, SOB, palpitation; dizziness, weakness; nausea, vomiting; diarrhea, constipation; abdominal pain; dysuria, hematuria; leg swelling; sick contacts; and recent travel.  Now s/p directed donor renal transplant on 2/1/2019.  2 renal arteries anastomosed to iliac via carrel patch, 1 vein, 1 ureter.   Recipient:  PRA: 0%  ABO: O last HD: PD 22:00 (1/31)  Donor: [facility] donor incarcerated COD: Drug overdose, anoxia. Elevated terminal creatinine in donor kidney.  Donor ID: [id number]  58y/o  CMV - /EBV + right kidney ABO: O KDPI: 81% cross clamp time: 01/31/2019 16:32hours. Post op HD x1 without fluid removal and continue on PD. Ceftriaxone for positive donor culture x 3 days completed, pt cultures remains negative to date.    Interval Events:  Tolerating CHO restricted diet.  Hyperglycemic.  Levemir increased to BID 30 units yesterday. +Flatus no BM. Pain well controlled with PRN. On PD at night. Ambulating    Potential Discharge date:  2/6/19    Education:  Medications    Plan of care:  See Below        MEDICATIONS  (STANDING):  basiliximab  IVPB 20 milliGRAM(s) IV Intermittent once  dextrose 5%. 1000 milliLiter(s) (50 mL/Hr) IV Continuous <Continuous>  dextrose 50% Injectable 12.5 Gram(s) IV Push once  dextrose 50% Injectable 25 Gram(s) IV Push once  dextrose 50% Injectable 25 Gram(s) IV Push once  docusate sodium 100 milliGRAM(s) Oral two times a day  famotidine    Tablet 20 milliGRAM(s) Oral daily  insulin detemir injectable (LEVEMIR) 30 Unit(s) SubCutaneous before breakfast  insulin detemir injectable (LEVEMIR) 30 Unit(s) SubCutaneous at bedtime  insulin lispro (HumaLOG) corrective regimen sliding scale   SubCutaneous Before meals and at bedtime  insulin lispro Injectable (HumaLOG) 7 Unit(s) SubCutaneous three times a day before meals  mycophenolate mofetil 1 Gram(s) Oral every 12 hours  nystatin    Suspension 189468 Unit(s) Swish and Swallow four times a day  predniSONE   Tablet 20 milliGRAM(s) Oral two times a day  senna 2 Tablet(s) Oral at bedtime  sorbitol 70%/mineral oil/magnesium hydroxide/glycerin Enema 120 milliLiter(s) Rectal once  tacrolimus 6 milliGRAM(s) Oral two times a day  trimethoprim   80 mG/sulfamethoxazole 400 mG 1 Tablet(s) Oral daily  valGANciclovir 450 milliGRAM(s) Oral <User Schedule>    MEDICATIONS  (PRN):  acetaminophen   Tablet .. 650 milliGRAM(s) Oral every 6 hours PRN Mild Pain (1 - 3)  dextrose 40% Gel 15 Gram(s) Oral once PRN Blood Glucose LESS THAN 70 milliGRAM(s)/deciliter  glucagon  Injectable 1 milliGRAM(s) IntraMuscular once PRN Glucose LESS THAN 70 milligrams/deciliter  ondansetron Injectable 4 milliGRAM(s) IV Push every 6 hours PRN Nausea and/or Vomiting  oxyCODONE    IR 5 milliGRAM(s) Oral every 4 hours PRN Moderate Pain (4 - 6)  oxyCODONE    IR 10 milliGRAM(s) Oral every 4 hours PRN Severe Pain (7 - 10)      PAST MEDICAL & SURGICAL HISTORY:  DM2 (diabetes mellitus, type 2)  Morbid obesity  Hypothyroidism  ESRD on peritoneal dialysis  HLD (hyperlipidemia)  History of partial gastrectomy  History of cholecystectomy  History of Porsha-en-Y gastric bypass      Vital Signs Last 24 Hrs  T(C): 36.4 (05 Feb 2019 10:39), Max: 36.8 (04 Feb 2019 13:53)  T(F): 97.5 (05 Feb 2019 10:39), Max: 98.2 (04 Feb 2019 13:53)  HR: 72 (05 Feb 2019 10:39) (72 - 82)  BP: 145/77 (05 Feb 2019 10:39) (121/64 - 149/78)  BP(mean): --  RR: 18 (05 Feb 2019 10:39) (18 - 20)  SpO2: 96% (05 Feb 2019 10:39) (93% - 97%)    I&O's Summary    04 Feb 2019 07:01  -  05 Feb 2019 07:00  --------------------------------------------------------  IN: 1390 mL / OUT: 1530 mL / NET: -140 mL    05 Feb 2019 07:01  -  05 Feb 2019 10:47  --------------------------------------------------------  IN: 0 mL / OUT: 275 mL / NET: -275 mL                              9.8    9.9   )-----------( 174      ( 05 Feb 2019 05:59 )             30.5     02-05    138  |  101  |  76<H>  ----------------------------<  236<H>  4.3   |  22  |  4.51<H>    Ca    7.2<L>      05 Feb 2019 05:59  Phos  5.4     02-05  Mg     2.4     02-05    TPro  6.8  /  Alb  3.1<L>  /  TBili  0.4  /  DBili  x   /  AST  27  /  ALT  23  /  AlkPhos  91  02-05    Tacrolimus (), Serum: 9.8 ng/mL (02-05 @ 07:46)      Review of systems  Gen: No weight changes, fatigue, fevers/chills, weakness  Skin: No rashes  Head/Eyes/Ears/Mouth: No headache; Normal hearing; Normal vision w/o blurriness; No sinus pain/discomfort, sore throat  Respiratory: No dyspnea, cough, wheezing, hemoptysis  CV: No chest pain, PND, orthopnea  GI: C/O moderate abdominal pain at surgical site. Denies diarrhea, constipation, nausea, vomiting, melena, hematochezia  : No increased frequency, dysuria, hematuria, nocturia  MSK: No joint pain/swelling; no back pain; some generalized edema  Neuro: No dizziness/lightheadedness, weakness, seizures, numbness, tingling  Heme: No easy bruising or bleeding  Endo: No heat/cold intolerance  Psych: No significant nervousness, anxiety, stress, depression  All other systems were reviewed and are negative, except as noted.      PHYSICAL EXAM:  Constitutional: Well developed / well nourished  Eyes: Anicteric, PERRLA  ENMT: nc/at  Neck: supple  Respiratory: CTA B/L  Cardiovascular: RRR  Gastrointestinal: Soft abdomen, ND, mild tender to touch at surgical site, +BS  Genitourinary: Voiding spontaneously, PD catheter in use  Extremities: SCD's in place and working bilaterally, trace BLE edema, + R knee swelling (stable since knee replacement)  Vascular: Palpable dp pulses bilaterally  Neurological: A&O x3  Skin: Mild serosanguinous ronn on wound dressing, no erythema and evidence of infection noted  Musculoskeletal: Moving all extremities  Psychiatric: Responsive

## 2019-02-05 NOTE — PROGRESS NOTE ADULT - PROBLEM SELECTOR PLAN 4
BG remains elevated. Basal/bolus regimen was increased.   - C/w levemir 30units BID.  - C/w insulin premeal to 7 units TID.  - C/w ISS.  - Monitor BG

## 2019-02-05 NOTE — PROGRESS NOTE ADULT - SUBJECTIVE AND OBJECTIVE BOX
[facility] DIVISION OF KIDNEY DISEASES AND HYPERTENSION -- FOLLOW UP NOTE  --------------------------------------------------------------------------------  Chief Complaint:  renal transplant    24 hour events/subjective:  Tolerating PD well. Making urine.       PAST HISTORY  --------------------------------------------------------------------------------  No significant changes to PMH, PSH, FHx, SHx, unless otherwise noted    ALLERGIES & MEDICATIONS  --------------------------------------------------------------------------------  Allergies    Lipitor (Hepatotoxicity)    Intolerances      Standing Inpatient Medications  basiliximab  IVPB 20 milliGRAM(s) IV Intermittent once  dextrose 5%. 1000 milliLiter(s) IV Continuous <Continuous>  dextrose 50% Injectable 12.5 Gram(s) IV Push once  dextrose 50% Injectable 25 Gram(s) IV Push once  dextrose 50% Injectable 25 Gram(s) IV Push once  docusate sodium 100 milliGRAM(s) Oral two times a day  famotidine    Tablet 20 milliGRAM(s) Oral daily  insulin detemir injectable (LEVEMIR) 30 Unit(s) SubCutaneous before breakfast  insulin detemir injectable (LEVEMIR) 30 Unit(s) SubCutaneous at bedtime  insulin lispro (HumaLOG) corrective regimen sliding scale   SubCutaneous Before meals and at bedtime  insulin lispro Injectable (HumaLOG) 7 Unit(s) SubCutaneous three times a day before meals  mycophenolate mofetil 1 Gram(s) Oral every 12 hours  nystatin    Suspension 480002 Unit(s) Swish and Swallow four times a day  predniSONE   Tablet 20 milliGRAM(s) Oral two times a day  senna 2 Tablet(s) Oral at bedtime  sorbitol 70%/mineral oil/magnesium hydroxide/glycerin Enema 120 milliLiter(s) Rectal once  tacrolimus 6 milliGRAM(s) Oral two times a day  trimethoprim   80 mG/sulfamethoxazole 400 mG 1 Tablet(s) Oral daily  valGANciclovir 450 milliGRAM(s) Oral <User Schedule>    PRN Inpatient Medications  acetaminophen   Tablet .. 650 milliGRAM(s) Oral every 6 hours PRN  dextrose 40% Gel 15 Gram(s) Oral once PRN  glucagon  Injectable 1 milliGRAM(s) IntraMuscular once PRN  ondansetron Injectable 4 milliGRAM(s) IV Push every 6 hours PRN  oxyCODONE    IR 5 milliGRAM(s) Oral every 4 hours PRN  oxyCODONE    IR 10 milliGRAM(s) Oral every 4 hours PRN      REVIEW OF SYSTEMS  --------------------------------------------------------------------------------  Gen: no fatigue  Respiratory: No dyspnea  CV: No chest pain  GI: No abdominal pain  MSK: No joint pain/swelling; no edema    VITALS/PHYSICAL EXAM  --------------------------------------------------------------------------------  T(C): 36.4 (02-05-19 @ 10:39), Max: 36.8 (02-04-19 @ 13:53)  HR: 72 (02-05-19 @ 10:39) (72 - 82)  BP: 145/77 (02-05-19 @ 10:39) (123/63 - 149/78)  RR: 18 (02-05-19 @ 10:39) (18 - 20)  SpO2: 96% (02-05-19 @ 10:39) (93% - 97%)  Wt(kg): --        02-04-19 @ 07:01  -  02-05-19 @ 07:00  --------------------------------------------------------  IN: 1390 mL / OUT: 1530 mL / NET: -140 mL    02-05-19 @ 07:01  -  02-05-19 @ 11:30  --------------------------------------------------------  IN: 0 mL / OUT: 275 mL / NET: -275 mL      Physical Exam:  	Gen: NAD  	Pulm: CTA B/L  	CV: RRR, S1S2; no rub  	Abd: obese abdomen, soft, nontender  	LE: Warm, mild edema  	Neuro: follows commands  	Psych: Normal affect and mood  	Skin: Warm, without rashes  	Vascular access: +PD catheter in situ    LABS/STUDIES  --------------------------------------------------------------------------------              9.8    9.9   >-----------<  174      [02-05-19 @ 05:59]              30.5     138  |  101  |  76  ----------------------------<  236      [02-05-19 @ 05:59]  4.3   |  22  |  4.51        Ca     7.2     [02-05-19 @ 05:59]      Mg     2.4     [02-05-19 @ 05:59]      Phos  5.4     [02-05-19 @ 05:59]    TPro  6.8  /  Alb  3.1  /  TBili  0.4  /  DBili  x   /  AST  27  /  ALT  23  /  AlkPhos  91  [02-05-19 @ 05:59]              [02-05-19 @ 05:59]    Creatinine Trend:  SCr 4.51 [02-05 @ 05:59]  SCr 4.45 [02-04 @ 06:11]  SCr 4.17 [02-03 @ 06:13]  SCr 5.08 [02-02 @ 04:55]  SCr 4.76 [02-01 @ 18:05]        HbA1c 6.2      [02-02-19 @ 08:28]    HBsAb 6.8      [02-02-19 @ 20:46]  HBsAg Nonreact      [02-02-19 @ 20:46]  HBcAb Nonreact      [02-02-19 @ 20:46]  HCV 0.14, Nonreact      [02-02-19 @ 20:46]

## 2019-02-05 NOTE — PROGRESS NOTE ADULT - ASSESSMENT
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 Discharge planning in progress.

## 2019-02-06 ENCOUNTER — TRANSCRIPTION ENCOUNTER (OUTPATIENT)
Age: 74
End: 2019-02-06

## 2019-02-06 VITALS
HEART RATE: 73 BPM | OXYGEN SATURATION: 95 % | DIASTOLIC BLOOD PRESSURE: 75 MMHG | RESPIRATION RATE: 20 BRPM | TEMPERATURE: 98 F | SYSTOLIC BLOOD PRESSURE: 137 MMHG

## 2019-02-06 LAB
ALBUMIN SERPL ELPH-MCNC: 3.1 G/DL — LOW (ref 3.3–5)
ALP SERPL-CCNC: 106 U/L — SIGNIFICANT CHANGE UP (ref 40–120)
ALT FLD-CCNC: 77 U/L — HIGH (ref 10–45)
ANION GAP SERPL CALC-SCNC: 15 MMOL/L — SIGNIFICANT CHANGE UP (ref 5–17)
AST SERPL-CCNC: 69 U/L — HIGH (ref 10–40)
BASOPHILS # BLD AUTO: 0 K/UL — SIGNIFICANT CHANGE UP (ref 0–0.2)
BASOPHILS NFR BLD AUTO: 0.4 % — SIGNIFICANT CHANGE UP (ref 0–2)
BILIRUB SERPL-MCNC: 0.4 MG/DL — SIGNIFICANT CHANGE UP (ref 0.2–1.2)
BUN SERPL-MCNC: 75 MG/DL — HIGH (ref 7–23)
CALCIUM SERPL-MCNC: 7.3 MG/DL — LOW (ref 8.4–10.5)
CHLORIDE SERPL-SCNC: 105 MMOL/L — SIGNIFICANT CHANGE UP (ref 96–108)
CO2 SERPL-SCNC: 19 MMOL/L — LOW (ref 22–31)
CREAT SERPL-MCNC: 3.97 MG/DL — HIGH (ref 0.5–1.3)
EOSINOPHIL # BLD AUTO: 0.1 K/UL — SIGNIFICANT CHANGE UP (ref 0–0.5)
EOSINOPHIL NFR BLD AUTO: 1.2 % — SIGNIFICANT CHANGE UP (ref 0–6)
GLUCOSE SERPL-MCNC: 90 MG/DL — SIGNIFICANT CHANGE UP (ref 70–99)
HCT VFR BLD CALC: 30.1 % — LOW (ref 39–50)
HGB BLD-MCNC: 10.2 G/DL — LOW (ref 13–17)
LDH SERPL L TO P-CCNC: 268 U/L — HIGH (ref 50–242)
LYMPHOCYTES # BLD AUTO: 0.8 K/UL — LOW (ref 1–3.3)
LYMPHOCYTES # BLD AUTO: 10.2 % — LOW (ref 13–44)
MAGNESIUM SERPL-MCNC: 2.3 MG/DL — SIGNIFICANT CHANGE UP (ref 1.6–2.6)
MCHC RBC-ENTMCNC: 32.3 PG — SIGNIFICANT CHANGE UP (ref 27–34)
MCHC RBC-ENTMCNC: 33.9 GM/DL — SIGNIFICANT CHANGE UP (ref 32–36)
MCV RBC AUTO: 95.4 FL — SIGNIFICANT CHANGE UP (ref 80–100)
MONOCYTES # BLD AUTO: 0.5 K/UL — SIGNIFICANT CHANGE UP (ref 0–0.9)
MONOCYTES NFR BLD AUTO: 6.8 % — SIGNIFICANT CHANGE UP (ref 2–14)
NEUTROPHILS # BLD AUTO: 6.5 K/UL — SIGNIFICANT CHANGE UP (ref 1.8–7.4)
NEUTROPHILS NFR BLD AUTO: 81.4 % — HIGH (ref 43–77)
PHOSPHATE SERPL-MCNC: 4.6 MG/DL — HIGH (ref 2.5–4.5)
PLATELET # BLD AUTO: 178 K/UL — SIGNIFICANT CHANGE UP (ref 150–400)
POTASSIUM SERPL-MCNC: 4.3 MMOL/L — SIGNIFICANT CHANGE UP (ref 3.5–5.3)
POTASSIUM SERPL-SCNC: 4.3 MMOL/L — SIGNIFICANT CHANGE UP (ref 3.5–5.3)
PROT SERPL-MCNC: 6.7 G/DL — SIGNIFICANT CHANGE UP (ref 6–8.3)
RBC # BLD: 3.15 M/UL — LOW (ref 4.2–5.8)
RBC # FLD: 12.1 % — SIGNIFICANT CHANGE UP (ref 10.3–14.5)
SODIUM SERPL-SCNC: 139 MMOL/L — SIGNIFICANT CHANGE UP (ref 135–145)
TACROLIMUS SERPL-MCNC: 13 NG/ML — SIGNIFICANT CHANGE UP
WBC # BLD: 8 K/UL — SIGNIFICANT CHANGE UP (ref 3.8–10.5)
WBC # FLD AUTO: 8 K/UL — SIGNIFICANT CHANGE UP (ref 3.8–10.5)

## 2019-02-06 PROCEDURE — 82962 GLUCOSE BLOOD TEST: CPT

## 2019-02-06 PROCEDURE — 99239 HOSP IP/OBS DSCHRG MGMT >30: CPT | Mod: 24

## 2019-02-06 PROCEDURE — 85027 COMPLETE CBC AUTOMATED: CPT

## 2019-02-06 PROCEDURE — 82330 ASSAY OF CALCIUM: CPT

## 2019-02-06 PROCEDURE — 80048 BASIC METABOLIC PNL TOTAL CA: CPT

## 2019-02-06 PROCEDURE — 99261: CPT

## 2019-02-06 PROCEDURE — 83615 LACTATE (LD) (LDH) ENZYME: CPT

## 2019-02-06 PROCEDURE — 85730 THROMBOPLASTIN TIME PARTIAL: CPT

## 2019-02-06 PROCEDURE — 84132 ASSAY OF SERUM POTASSIUM: CPT

## 2019-02-06 PROCEDURE — 82947 ASSAY GLUCOSE BLOOD QUANT: CPT

## 2019-02-06 PROCEDURE — 86803 HEPATITIS C AB TEST: CPT

## 2019-02-06 PROCEDURE — 86706 HEP B SURFACE ANTIBODY: CPT

## 2019-02-06 PROCEDURE — 82803 BLOOD GASES ANY COMBINATION: CPT

## 2019-02-06 PROCEDURE — 85014 HEMATOCRIT: CPT

## 2019-02-06 PROCEDURE — 93005 ELECTROCARDIOGRAM TRACING: CPT

## 2019-02-06 PROCEDURE — 83036 HEMOGLOBIN GLYCOSYLATED A1C: CPT

## 2019-02-06 PROCEDURE — 84295 ASSAY OF SERUM SODIUM: CPT

## 2019-02-06 PROCEDURE — 99232 SBSQ HOSP IP/OBS MODERATE 35: CPT | Mod: GC

## 2019-02-06 PROCEDURE — 86850 RBC ANTIBODY SCREEN: CPT

## 2019-02-06 PROCEDURE — 80197 ASSAY OF TACROLIMUS: CPT

## 2019-02-06 PROCEDURE — 83735 ASSAY OF MAGNESIUM: CPT

## 2019-02-06 PROCEDURE — 87040 BLOOD CULTURE FOR BACTERIA: CPT

## 2019-02-06 PROCEDURE — 86900 BLOOD TYPING SEROLOGIC ABO: CPT

## 2019-02-06 PROCEDURE — 80053 COMPREHEN METABOLIC PANEL: CPT

## 2019-02-06 PROCEDURE — 74176 CT ABD & PELVIS W/O CONTRAST: CPT

## 2019-02-06 PROCEDURE — 76776 US EXAM K TRANSPL W/DOPPLER: CPT

## 2019-02-06 PROCEDURE — 86704 HEP B CORE ANTIBODY TOTAL: CPT

## 2019-02-06 PROCEDURE — C2617: CPT

## 2019-02-06 PROCEDURE — 84100 ASSAY OF PHOSPHORUS: CPT

## 2019-02-06 PROCEDURE — 71045 X-RAY EXAM CHEST 1 VIEW: CPT

## 2019-02-06 PROCEDURE — 86901 BLOOD TYPING SEROLOGIC RH(D): CPT

## 2019-02-06 PROCEDURE — 85610 PROTHROMBIN TIME: CPT

## 2019-02-06 PROCEDURE — 87340 HEPATITIS B SURFACE AG IA: CPT

## 2019-02-06 PROCEDURE — 82435 ASSAY OF BLOOD CHLORIDE: CPT

## 2019-02-06 PROCEDURE — 83605 ASSAY OF LACTIC ACID: CPT

## 2019-02-06 RX ORDER — VALGANCICLOVIR 450 MG/1
1 TABLET, FILM COATED ORAL
Qty: 0 | Refills: 0 | COMMUNITY
Start: 2019-02-06

## 2019-02-06 RX ORDER — INSULIN LISPRO 100/ML
1 VIAL (ML) SUBCUTANEOUS
Qty: 0 | Refills: 0 | DISCHARGE
Start: 2019-02-06

## 2019-02-06 RX ORDER — ACETAMINOPHEN 500 MG
2 TABLET ORAL
Qty: 0 | Refills: 0 | DISCHARGE
Start: 2019-02-06

## 2019-02-06 RX ORDER — INSULIN LISPRO 100/ML
5 VIAL (ML) SUBCUTANEOUS
Qty: 0 | Refills: 0 | COMMUNITY
Start: 2019-02-06

## 2019-02-06 RX ORDER — INSULIN ASPART 100 [IU]/ML
5 INJECTION, SOLUTION SUBCUTANEOUS
Qty: 1 | Refills: 0
Start: 2019-02-06 | End: 2019-03-07

## 2019-02-06 RX ORDER — FAMOTIDINE 10 MG/ML
1 INJECTION INTRAVENOUS
Qty: 0 | Refills: 0 | COMMUNITY
Start: 2019-02-06

## 2019-02-06 RX ORDER — INSULIN DETEMIR 100/ML (3)
25 INSULIN PEN (ML) SUBCUTANEOUS
Qty: 0 | Refills: 0 | DISCHARGE
Start: 2019-02-06

## 2019-02-06 RX ORDER — MYCOPHENOLATE MOFETIL 250 MG/1
1000 CAPSULE ORAL
Qty: 0 | Refills: 0 | DISCHARGE
Start: 2019-02-06

## 2019-02-06 RX ORDER — INSULIN LISPRO 100/ML
5 VIAL (ML) SUBCUTANEOUS
Qty: 0 | Refills: 0 | Status: DISCONTINUED | OUTPATIENT
Start: 2019-02-06 | End: 2019-02-06

## 2019-02-06 RX ORDER — TACROLIMUS 5 MG/1
4 CAPSULE ORAL
Qty: 0 | Refills: 0 | COMMUNITY
Start: 2019-02-06

## 2019-02-06 RX ORDER — DOCUSATE SODIUM 100 MG
1 CAPSULE ORAL
Qty: 0 | Refills: 0 | COMMUNITY
Start: 2019-02-06

## 2019-02-06 RX ORDER — NYSTATIN 500MM UNIT
5 POWDER (EA) MISCELLANEOUS
Qty: 0 | Refills: 0 | COMMUNITY
Start: 2019-02-06

## 2019-02-06 RX ORDER — SENNA PLUS 8.6 MG/1
2 TABLET ORAL
Qty: 0 | Refills: 0 | COMMUNITY
Start: 2019-02-06

## 2019-02-06 RX ADMIN — Medication 650 MILLIGRAM(S): at 10:50

## 2019-02-06 RX ADMIN — TACROLIMUS 6 MILLIGRAM(S): 5 CAPSULE ORAL at 06:28

## 2019-02-06 RX ADMIN — OXYCODONE HYDROCHLORIDE 10 MILLIGRAM(S): 5 TABLET ORAL at 00:19

## 2019-02-06 RX ADMIN — Medication 500000 UNIT(S): at 00:19

## 2019-02-06 RX ADMIN — FAMOTIDINE 20 MILLIGRAM(S): 10 INJECTION INTRAVENOUS at 12:41

## 2019-02-06 RX ADMIN — OXYCODONE HYDROCHLORIDE 5 MILLIGRAM(S): 5 TABLET ORAL at 09:35

## 2019-02-06 RX ADMIN — OXYCODONE HYDROCHLORIDE 10 MILLIGRAM(S): 5 TABLET ORAL at 01:23

## 2019-02-06 RX ADMIN — OXYCODONE HYDROCHLORIDE 10 MILLIGRAM(S): 5 TABLET ORAL at 05:36

## 2019-02-06 RX ADMIN — Medication 2: at 12:42

## 2019-02-06 RX ADMIN — MYCOPHENOLATE MOFETIL 1 GRAM(S): 250 CAPSULE ORAL at 06:28

## 2019-02-06 RX ADMIN — VALGANCICLOVIR 450 MILLIGRAM(S): 450 TABLET, FILM COATED ORAL at 09:02

## 2019-02-06 RX ADMIN — Medication 500000 UNIT(S): at 12:41

## 2019-02-06 RX ADMIN — Medication 500000 UNIT(S): at 06:28

## 2019-02-06 RX ADMIN — Medication 10 MILLIGRAM(S): at 06:28

## 2019-02-06 RX ADMIN — Medication 7 UNIT(S): at 09:02

## 2019-02-06 RX ADMIN — OXYCODONE HYDROCHLORIDE 5 MILLIGRAM(S): 5 TABLET ORAL at 09:03

## 2019-02-06 RX ADMIN — OXYCODONE HYDROCHLORIDE 10 MILLIGRAM(S): 5 TABLET ORAL at 04:12

## 2019-02-06 RX ADMIN — Medication 5 UNIT(S): at 12:42

## 2019-02-06 RX ADMIN — Medication 1 TABLET(S): at 12:41

## 2019-02-06 RX ADMIN — Medication 100 MILLIGRAM(S): at 06:28

## 2019-02-06 NOTE — DISCHARGE NOTE ADULT - PLAN OF CARE
To be free of infection and rejection No heavy lifting anything more than 10-15lbs or straining. Otherwise, you may return to your usual level of physical activity. If you are taking narcotic pain medication (such as Percocet), do NOT drive a car, operate machinery or make important decisions.  Call transplant clinic If you developed any of the following, fever, pain, redness, swelling at incision site, cough, nausea, vomiting, painful urination, difficulty urination, or not making any urine.  NOTIFY YOUR SURGEON IF: You have any bleeding that does not stop, any pus draining from your wound, any fever (over 100.4 F) or chills, persistent nausea/vomiting with inability to tolerate food or liquids, persistent diarrhea, or if your pain is not controlled on your discharge pain medications. Keep away from people who have cough, cold, and symptom of flu.  Only take medications that are on your discharge list  If you missed your medications call the transplant office, do not double up medication because you missed a dose.  If you have any question regarding your medication please call transplant office. Follow a low carb low sugar diet. Continue to take all antidiabetic medications/insulin as prescribed. Follow up with your Primary Care Doctor regularly for blood sugar/A1c checks. Be sure to see an eye doctor and foot doctor on an annual basis. Follow a low carb low sugar diet. Continue to take all antidiabetic medications/insulin as prescribed. Follow up with your Primary Care Doctor regularly for blood sugar/A1c checks. Be sure to see an eye doctor and foot doctor on an annual basis.  Document your fingerstick glucose recordings and bring this with you to your follow-up appointment

## 2019-02-06 NOTE — PROGRESS NOTE ADULT - SUBJECTIVE AND OBJECTIVE BOX
Learner: Patient  Barriers: None    Patient received a renal transplant on 2/1/2019    Method used: Verbal discussion and written material    Medication safety was discussed with the patient: allergies, herbals, adherence, interactions, medication precautions, miss dose instructions, purpose, side effects, signs and symptoms to report, and storage & handling    Patient was able to repeat and verbalize key points    Education Summary: Discharge immunosuppressant medications and prophylatic anti-infective agents reviewed with the patient. Outpatient medication schedule was discussed in detail including: medication name, indication, dose, administration times, treatment duration, side effects, drug interactions, and special instructions. Patient questions and concerns were answered and addressed. Patient demonstrated understanding.    Time spent discharge education: 60 minutes    Renal Transplant medications:  Tacrolimus adjusted to trough   Mycophenolate mofetil 1g BID  Prednisone taper to 5mg daily  Sulfamethoxazole/Trimethoprim 1SS daily  Clotrimazole 10mg lozenge twice daily  Valganciclovir 450 mg Mon, Wed, Fri  Pepcid 20 mg daily  Docusate and Senna      Other meds:   Levemir  Novolog

## 2019-02-06 NOTE — PROGRESS NOTE ADULT - ASSESSMENT
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 Discharge planning in progress.       Problem/Plan - 1:  ·  Problem: Kidney transplant recipient.  Plan: S/P DDRT, DGF s/p 1 session HD on 2/2  and PD overnight.  Will hold PD as per renal today and monitor labs in am  Completed Ceftriaxone x3 days to cover positive donor culture  BC x2 NTD  Good UO monitor Strict I/Os  On PRN Oxycodone and Tylenol  Continue bowel regimen, no BM this far, add lactulose and SMOG enema x1 today  Encourage ambulation, IS  SCDs  daily CBC, BMP.  CHO restricted renal diet.      Problem/Plan - 2:  ·  Problem: Immunosuppression.  Plan: Continue Tacrolimus, cellcept, steroid taper, Nystatin S&S, bactrim, and Valcyte TIW  F/U Tacrolimus level daily, tacro goal 8-10  Simulect induction next dose due day 4.      Problem/Plan - 3:  ·  Problem: DM2 (diabetes mellitus, type 2).  Plan: Hyperglycemia on steroid taper  Increase Levemir  to 30 U BID, and continue Humalog to 7units pre-meals  Fingersticks premeal and qhs with SSI coverage. 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

## 2019-02-06 NOTE — PROVIDER CONTACT NOTE (OTHER) - ASSESSMENT
Patient VS as documented; resting comfortably; pt shows no s/s of distress. Pt did have evening diabetic snack. Last nights B, 25 units of levemir given. Recheck @ 0100: B

## 2019-02-06 NOTE — PROGRESS NOTE ADULT - PROBLEM SELECTOR PLAN 3
Hyperglycemia on steroid taper  Increase Levemir  to 30 U BID, and continue Humalog to 7units pre-meals  Fingersticks premeal and qhs with SSI coverage.
BG rising.  REsume home levemir 18 units in the morning and cont sliding scale,.
BG rising.  Used to take 70 units of insulin.  Increase levemir to 18units BID, add 5 units insulin premeal as well.
Hyperglycemia on steroid taper  Increase Levemir  to 25U BID, and Humalog to 7units pre-meals  Fingersticks premeal and qhs with SSI coverage.
S/p simulect induction.  - C/w tacro 4/4. Goal 7-10  - C/w MMF 1gm BID.   - C/w steroid taper.  - Valcyte, Bactrim, nystatin for ppx.
S/p simulect induction.  - C/w tacro 6/6. Goal 7-10  - C/w MMF 1gm BID.   - C/w steroid taper.  - Valcyte, Bactrim, nystatin for ppx.
S/p simulect induction.  - C/w tacro 6/6. Goal 7-10  - C/w MMF 1gm BID.   - C/w steroid taper.  - Valcyte, Bactrim, nystatin for ppx.
Hyperglycemia on steroid taper  Resume home Levemir 18U qam  Fingersticks premeal and qhs with SSI coverage
Blood glucose better controlled, tapered to oral steroids  DC am Levemir  Continue Levemir 25U qpm Humalog to 5units pre-meals  Fingersticks premeal and qhs with SSI coverage.
Hyperglycemia on steroid taper  Levemir 18U qam and 18U qpm  Fingersticks premeal and qhs with SSI coverage.

## 2019-02-06 NOTE — PROGRESS NOTE ADULT - SUBJECTIVE AND OBJECTIVE BOX
[facility] DIVISION OF KIDNEY DISEASES AND HYPERTENSION -- FOLLOW UP NOTE  --------------------------------------------------------------------------------  Chief Complaint:  renal transplant    24 hour events/subjective:  Passing gas. Has a small BM after enema yesterday. Still feeling constipated.       PAST HISTORY  --------------------------------------------------------------------------------  No significant changes to PMH, PSH, FHx, SHx, unless otherwise noted    ALLERGIES & MEDICATIONS  --------------------------------------------------------------------------------  Allergies    Lipitor (Hepatotoxicity)    Intolerances      Standing Inpatient Medications  dextrose 5%. 1000 milliLiter(s) IV Continuous <Continuous>  dextrose 50% Injectable 12.5 Gram(s) IV Push once  dextrose 50% Injectable 25 Gram(s) IV Push once  dextrose 50% Injectable 25 Gram(s) IV Push once  docusate sodium 100 milliGRAM(s) Oral two times a day  famotidine    Tablet 20 milliGRAM(s) Oral daily  insulin detemir injectable (LEVEMIR) 25 Unit(s) SubCutaneous at bedtime  insulin lispro (HumaLOG) corrective regimen sliding scale   SubCutaneous Before meals and at bedtime  insulin lispro Injectable (HumaLOG) 5 Unit(s) SubCutaneous three times a day before meals  mycophenolate mofetil 1 Gram(s) Oral every 12 hours  nystatin    Suspension 742006 Unit(s) Swish and Swallow four times a day  predniSONE   Tablet 10 milliGRAM(s) Oral two times a day  senna 2 Tablet(s) Oral at bedtime  tacrolimus 6 milliGRAM(s) Oral two times a day  trimethoprim   80 mG/sulfamethoxazole 400 mG 1 Tablet(s) Oral daily  valGANciclovir 450 milliGRAM(s) Oral <User Schedule>    PRN Inpatient Medications  acetaminophen   Tablet .. 650 milliGRAM(s) Oral every 6 hours PRN  dextrose 40% Gel 15 Gram(s) Oral once PRN  glucagon  Injectable 1 milliGRAM(s) IntraMuscular once PRN  ondansetron Injectable 4 milliGRAM(s) IV Push every 6 hours PRN  oxyCODONE    IR 5 milliGRAM(s) Oral every 4 hours PRN  oxyCODONE    IR 10 milliGRAM(s) Oral every 4 hours PRN      REVIEW OF SYSTEMS  --------------------------------------------------------------------------------  Gen: No fevers  Respiratory: No dyspnea  CV: No chest pain  GI: +constipation  MSK: No edema    VITALS/PHYSICAL EXAM  --------------------------------------------------------------------------------  T(C): 36.6 (02-06-19 @ 09:58), Max: 36.8 (02-06-19 @ 01:30)  HR: 82 (02-06-19 @ 09:58) (74 - 82)  BP: 132/76 (02-06-19 @ 09:58) (132/76 - 155/80)  RR: 18 (02-06-19 @ 09:58) (18 - 19)  SpO2: 96% (02-06-19 @ 09:58) (96% - 98%)  Wt(kg): --        02-05-19 @ 07:01  -  02-06-19 @ 07:00  --------------------------------------------------------  IN: 780 mL / OUT: 1900 mL / NET: -1120 mL    02-06-19 @ 07:01  -  02-06-19 @ 12:04  --------------------------------------------------------  IN: 380 mL / OUT: 500 mL / NET: -120 mL      Physical Exam:  	Gen: NAD  	Pulm: CTA B/L  	CV: RRR, S1S2; no rub  	Abd: obese abdomen, soft, nontender  	LE: Warm, mild edema  	Neuro: follows commands  	Psych: Normal affect and mood  	Skin: Warm, without rashes  	Vascular access: +PD catheter in situ    LABS/STUDIES  --------------------------------------------------------------------------------              10.2   8.0   >-----------<  178      [02-06-19 @ 07:18]              30.1     139  |  105  |  75  ----------------------------<  90      [02-06-19 @ 07:16]  4.3   |  19  |  3.97        Ca     7.3     [02-06-19 @ 07:16]      Mg     2.3     [02-06-19 @ 07:16]      Phos  4.6     [02-06-19 @ 07:16]    TPro  6.7  /  Alb  3.1  /  TBili  0.4  /  DBili  x   /  AST  69  /  ALT  77  /  AlkPhos  106  [02-06-19 @ 07:16]              [02-06-19 @ 07:16]    Creatinine Trend:  SCr 3.97 [02-06 @ 07:16]  SCr 4.51 [02-05 @ 05:59]  SCr 4.45 [02-04 @ 06:11]  SCr 4.17 [02-03 @ 06:13]  SCr 5.08 [02-02 @ 04:55]        HbA1c 6.2      [02-02-19 @ 08:28]    HBsAb 6.8      [02-02-19 @ 20:46]  HBsAg Nonreact      [02-02-19 @ 20:46]  HBcAb Nonreact      [02-02-19 @ 20:46]  HCV 0.14, Nonreact      [02-02-19 @ 20:46]

## 2019-02-06 NOTE — PROGRESS NOTE ADULT - PROVIDER SPECIALTY LIST ADULT
Anesthesia
Nephrology
Pain Medicine
Pharmacy
Pharmacy
Surgery
Transplant Surgery

## 2019-02-06 NOTE — DISCHARGE NOTE ADULT - CARE PLAN
Principal Discharge DX:	Kidney transplant recipient  Goal:	To be free of infection and rejection  Assessment and plan of treatment:	No heavy lifting anything more than 10-15lbs or straining. Otherwise, you may return to your usual level of physical activity. If you are taking narcotic pain medication (such as Percocet), do NOT drive a car, operate machinery or make important decisions.  Call transplant clinic If you developed any of the following, fever, pain, redness, swelling at incision site, cough, nausea, vomiting, painful urination, difficulty urination, or not making any urine.  NOTIFY YOUR SURGEON IF: You have any bleeding that does not stop, any pus draining from your wound, any fever (over 100.4 F) or chills, persistent nausea/vomiting with inability to tolerate food or liquids, persistent diarrhea, or if your pain is not controlled on your discharge pain medications.  Secondary Diagnosis:	Immunosuppression  Assessment and plan of treatment:	Keep away from people who have cough, cold, and symptom of flu.  Only take medications that are on your discharge list  If you missed your medications call the transplant office, do not double up medication because you missed a dose.  If you have any question regarding your medication please call transplant office.  Secondary Diagnosis:	DM2 (diabetes mellitus, type 2)  Assessment and plan of treatment:	Follow a low carb low sugar diet. Continue to take all antidiabetic medications/insulin as prescribed. Follow up with your Primary Care Doctor regularly for blood sugar/A1c checks. Be sure to see an eye doctor and foot doctor on an annual basis. Principal Discharge DX:	Kidney transplant recipient  Goal:	To be free of infection and rejection  Assessment and plan of treatment:	No heavy lifting anything more than 10-15lbs or straining. Otherwise, you may return to your usual level of physical activity. If you are taking narcotic pain medication (such as Percocet), do NOT drive a car, operate machinery or make important decisions.  Call transplant clinic If you developed any of the following, fever, pain, redness, swelling at incision site, cough, nausea, vomiting, painful urination, difficulty urination, or not making any urine.  NOTIFY YOUR SURGEON IF: You have any bleeding that does not stop, any pus draining from your wound, any fever (over 100.4 F) or chills, persistent nausea/vomiting with inability to tolerate food or liquids, persistent diarrhea, or if your pain is not controlled on your discharge pain medications.  Secondary Diagnosis:	Immunosuppression  Assessment and plan of treatment:	Keep away from people who have cough, cold, and symptom of flu.  Only take medications that are on your discharge list  If you missed your medications call the transplant office, do not double up medication because you missed a dose.  If you have any question regarding your medication please call transplant office.  Secondary Diagnosis:	DM2 (diabetes mellitus, type 2)  Assessment and plan of treatment:	Follow a low carb low sugar diet. Continue to take all antidiabetic medications/insulin as prescribed. Follow up with your Primary Care Doctor regularly for blood sugar/A1c checks. Be sure to see an eye doctor and foot doctor on an annual basis.  Document your fingerstick glucose recordings and bring this with you to your follow-up appointment

## 2019-02-06 NOTE — PROGRESS NOTE ADULT - PROBLEM SELECTOR PLAN 4
BG controlled today.   - C/w levemir and insulin premeal.  - May need to decrease doses as pt off PD now.   - C/w ICS.  - Monitor BG

## 2019-02-06 NOTE — DISCHARGE NOTE ADULT - PATIENT PORTAL LINK FT
You can access the [facility] Patient Portal, offered by [facility], by registering with the following website: [email or web address]

## 2019-02-06 NOTE — PROGRESS NOTE ADULT - ATTENDING COMMENTS
sp DDRTx.   DGF graft.  Rec'd PD overnight, did not enter peritoneum during case.  Incision looks good    will stop fluids.  tolerating diet.     Immuno: tac goal 8-10, cellcept 1gm BID, steroid taper
Improving UOP.  Will hold PD.    Immuno: tac goal 8-10, cellcept 1gm BID, steroid taper
sp DDRTx.  DGF graft.    Receiving PD with no issues.  UOP increased overnight.     Immuno: tac goal 8-10, cellcept 1gm BID, steroid taper
Seen on 2/2.     sp DDRTx.  DGF +.  Rec'd hemodialysis.  Will switch to PD.    Immuno: tac goal 8-10, cellcept 1gm BID, steroid taper
sp DDRTx. Graft beginning to function well.  PD stopped.    feels well.  echo to eval pericardial mass unless cardiology feels it is unindicated    Immuno: tac goal 8-10, cellcept 1gm BID, steroid taper
Kidney recipient, DGF on PD  DM, hyperglycemia, HTN  Reviewed immunosuppression and allograft function.  Plan:  Observe off PD  reviewed insulin regimen  Will follow  I was present during and reviewed clinical and lab data as well as assessment and plan as documented by the house staff as noted. Please contact if any additional questions with any change in clinical condition or on availability of any additional information or reports.
Renal Transplant recipient, DM, ESRD on PD, DGF on CCPD  Plan:  Reviewed PD prescription changes with team  Reviewed immunosuppression and allograft function  Non oliguric, creatinine yet to show downtrend  Will follow
Renal transplant recipient, DGF, recovering, non oliguric, DM, improved hyperglycemia.  Reviewed allograft function and immunosuppression.  discharge planning in progress  I was present during and reviewed clinical and lab data as well as assessment and plan as documented by the house staff as noted. Please contact if any additional questions with any change in clinical condition or on availability of any additional information or reports.

## 2019-02-06 NOTE — DISCHARGE NOTE ADULT - CARE PROVIDER_API CALL
Srikanth Herbert)  Surgery  [address] [place] [address]  Phone: [phone]  Fax: [phone]  Follow Up Time:     Tyrone Waggoner (DO)  Nephrology  [address] [place] [address]  Phone: [phone]  Fax: [phone]  Follow Up Time:

## 2019-02-06 NOTE — DISCHARGE NOTE ADULT - HOSPITAL COURSE
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  He is now s/p directed donor renal transplant on 2/1/2019.  2 renal arteries anastomosed to iliac via carrel patch, 1 vein, 1 ureter.   Recipient:  PRA: 0%  ABO: O last HD: PD 22:00 (1/31)  Donor: [facility] donor incarcerated COD: Drug overdose, anoxia. Elevated terminal creatinine in donor kidney.  Donor ID: [id number]  60y/o  CMV - /EBV + right kidney ABO: O KDPI: 81% cross clamp time: 01/31/2019 16:32hours. His post-operative course was notable for delayed graft function and hyperglycemia. He received 3 days Ceftriaxone for positive donor culture.  BC remained negative to date. He underwent single HD session POD1 and then was continued on nightly PD.  His urine output slowly improved and PD was stopped on POD 5.  His creatinine improved to 3.97 on the day of discharge and his electrolytes remained stable.  His insulin regimen was adjusted daily with improved control. A 3.2cm pericardial lesion was noted on pre-op NCCT.  This was discussed with cardiology and will be followed up on as an outpatient.  He remained hemodynamically stable. He was ambulating, tolerating a regular diet and had bowel function.   He received final Simulect dose of POD 5.  His immunosuppression was optimized.  He was discharged to home on Tacrolimus 4mg BID, MMF 1gm BID, oral Prednisone taper and prophylactic agents Bactrim, Clotrimazole troches and Valcyte 450mg TIW.  He was evaluated by the multi-disciplinary team including surgeon, nephrologist, NP, pharmacist, nutrition, social work, and nursing and deemed stable for discharge to home with close outpatient follow-up.

## 2019-02-06 NOTE — PROVIDER CONTACT NOTE (OTHER) - ASSESSMENT
Patient VS as documented; resting comfortably; pt shows no s/s of distress. Pt did have evening diabetic snack. Last nights B, 25 units of levemir given.

## 2019-02-06 NOTE — PROGRESS NOTE ADULT - PROBLEM SELECTOR PLAN 1
S/P DDRT, DGF s/p 1 session HD on 2/2  and PD overnight.  Will hold PD as per renal today and monitor labs in am  Completed Ceftriaxone x3 days to cover positive donor culture  BC x2 NTD  Good UO monitor Strict I/Os  On PRN Oxycodone and Tylenol  Continue bowel regimen, no BM this far, add lactulose and SMOG enema x1 today  Encourage ambulation, IS  SCDs  daily CBC, BMP.  CHO restricted renal diet.
DGF s/p 1 session HD on 2/2  and PD overnight.  Continue PD as per renal with decreased amount of fluid   Completed Ceftriaxone x3 days to cover positive donor culture  FU BC x2 sent on 2/2  D/C vital and monitor UO hourly Strict I/Os  On PRN Oxycodone and Tylenol  Continue bowel regimen, no BM this far, add dulcolax suppository x 1 today  Encourage ambulation, IS  SCDs  daily CBC, BMP.  CHO restricted renal diet.
Likely ATN of transplant due to ischemia reperfusion. Terminal sCr was ~3 in donor. Good UO. SCr now improving.   - Continue to hold PD.  - Monitor BMP.   - Monitor UO.  - Off Abx.
Likely ATN of transplant due to ischemia reperfusion. Terminal sCr was ~3 in donor. Good UO. SCr overall stable.  - Will HOLD PD for tonight.   - Monitor BMP.   - Monitor UO.  - Off Abx.
Likely ATN of transplant due to ischemia reperfusion. Terminal sCr was ~3 in donor. UO increasing today.  - Will c/w PD tonight, but decrease fill volume. Will plan for 4 exchanges of 1L of 2.5% over 10hrs. Will leave dry in day time. (At home used 2.5% and last dwell was with icodextran).   - Monitor BMP  - Monitor UO without Nguyen today.
s/p DDRT on 2/1/19  Has ATN of transplant due to ischemia reperfusion and tcr was high in donor.  PD tonight.  Plan 4 1.5L 2.5% exchanges over 10 hours.  At home he used 2.5L exchanges and performed last duel with icodextran (extraneal).
s/p DDRT on 2/1/19  Has ATN of transplant due to ischemia reperfusion and tcr was high in donor.  Will treat with 1 session of hemodialysis, no UF and switch to PD tonight.  Plan 3 2L 2.5% exchanges over 10 hours.  At home he used 2.5L exchanges and performed last duel with icodextran.
ATN.  Elevated terminal creatinine in donor kidney.   Creatinine today 5.08   K+ 5.6  Session HD today with plan to start PD tonight as per renal  Repeat renal ultrasound today  Continue Ceftriaxone x3 days to cover positive donor culture  BC x2 today  Maintain Nguyen for bladder decompression. Strict I/Os  D/C IVF  Continue PCA for now and then transition to PRN Oxycodone and Tylenol  Start bowel regimen  Encourage ambulation, IS  SCDs  daily CBC, BMP
S/P DDRT c/b DGF s/p 1 session HD and daily PD stopped yesterday.   Creatinine downtrending with good uop  I/Os  Completed Ceftriaxone x3 days to cover positive donor culture  BC x2 NTD  On PRN Oxycodone and Tylenol  Continue bowel regimen  Encourage ambulation, IS  SCDs  daily CBC, BMP.  CHO restricted diet.  Discharge to home today  DW cardiology regarding findings of 3.2cm right pericardial lesion noted on pre-op CT
DGF s/p 1 session HD on 2/2  and PD overnight.  Continue PD as per renal  FU Repeat renal ultrasound   Continue Ceftriaxone x3 days to cover positive donor culture (to stop today)  FU BC x2 sent on 2/2  Maintain Nguyen for bladder decompression. Strict I/Os  DC PCA. Start PRN Oxycodone and Tylenol  Continue bowel regimen  Encourage ambulation, IS  SCDs  daily CBC, BMP.  CHO restricted renal diet

## 2019-02-06 NOTE — PROGRESS NOTE ADULT - PROBLEM SELECTOR PROBLEM 2
Immunosuppression
Kidney transplant recipient
Immunosuppression

## 2019-02-06 NOTE — PROGRESS NOTE ADULT - PROBLEM SELECTOR PROBLEM 3
DM2 (diabetes mellitus, type 2)
Immunosuppression
DM2 (diabetes mellitus, type 2)

## 2019-02-06 NOTE — PROGRESS NOTE ADULT - SUBJECTIVE AND OBJECTIVE BOX
Transplant Surgery - Multidisciplinary Rounds  --------------------------------------------------------------  Directed donation DDRT    Date: 2/1/19        POD# 6    Present:  Patient seen with multidisciplinary team icluding (Transplant Surgeon: Dr. Franklin, Dr. Omer, Dr. Napoles, Dr. Herbert, third year resident MAGO Capps. Transplant Nephrologist: Dr. Urbano, Dr. Waggoner, nephrology fellow NASH Ruff, Transplant Coordinators: Jonathan Casey, Charlene Block, Jamari (Voula) Irais Garcia. Social Work:  Brittny Cross, Olivia Pedraza. Tricia Arthur (Living Donor SW/NOAH). Pharmacist: Jesus Barnett, pharmacy resident and student. Nutrition: Dina Canela. NP Yee Garnett, NP director Amy Nieves, RN, and RN manager in am rounds and examined with Dr. Herbert. Disciplines not in attendance will be notified of the plan.     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  Pt presents to [facility] as a direct admit for DDRT. Pt states he instilled 1 bag of PD fluid at 10PM PTA.  Pt continues to make urine.  The patient denies fever, chills; chest pain, SOB, palpitation; dizziness, weakness; nausea, vomiting; diarrhea, constipation; abdominal pain; dysuria, hematuria; leg swelling; sick contacts; and recent travel.  Now s/p directed donor renal transplant on 2/1/2019.  2 renal arteries anastomosed to iliac via carrel patch, 1 vein, 1 ureter.   Recipient:  PRA: 0%  ABO: O last HD: PD 22:00 (1/31)  Donor: [facility] donor incarcerated COD: Drug overdose, anoxia. Elevated terminal creatinine in donor kidney.  Donor ID: [id number]  58y/o  CMV - /EBV + right kidney ABO: O KDPI: 81% cross clamp time: 01/31/2019 16:32hours. Post op HD x1 without fluid removal and continue on PD. Ceftriaxone for positive donor culture x 3 days completed, pt cultures remains negative to date.    Interval Events:  Tolerating CHO restricted diet.  SMOG enema yesterday with minimal stool. PM Levemir decreased for 9pm glucose 97 and am glucose 87. +PD held last night.  Creatinine 3.97 today.  Ambulating.     Potential Discharge date:  2/6/19    Education:  Medications    Plan of care:  See Below    MEDICATIONS  (STANDING):  dextrose 5%. 1000 milliLiter(s) (50 mL/Hr) IV Continuous <Continuous>  dextrose 50% Injectable 12.5 Gram(s) IV Push once  dextrose 50% Injectable 25 Gram(s) IV Push once  dextrose 50% Injectable 25 Gram(s) IV Push once  docusate sodium 100 milliGRAM(s) Oral two times a day  famotidine    Tablet 20 milliGRAM(s) Oral daily  insulin detemir injectable (LEVEMIR) 25 Unit(s) SubCutaneous at bedtime  insulin lispro (HumaLOG) corrective regimen sliding scale   SubCutaneous Before meals and at bedtime  insulin lispro Injectable (HumaLOG) 5 Unit(s) SubCutaneous three times a day before meals  mycophenolate mofetil 1 Gram(s) Oral every 12 hours  nystatin    Suspension 633070 Unit(s) Swish and Swallow four times a day  predniSONE   Tablet 10 milliGRAM(s) Oral two times a day  senna 2 Tablet(s) Oral at bedtime  tacrolimus 6 milliGRAM(s) Oral two times a day  trimethoprim   80 mG/sulfamethoxazole 400 mG 1 Tablet(s) Oral daily  valGANciclovir 450 milliGRAM(s) Oral <User Schedule>    MEDICATIONS  (PRN):  acetaminophen   Tablet .. 650 milliGRAM(s) Oral every 6 hours PRN Mild Pain (1 - 3)  dextrose 40% Gel 15 Gram(s) Oral once PRN Blood Glucose LESS THAN 70 milliGRAM(s)/deciliter  glucagon  Injectable 1 milliGRAM(s) IntraMuscular once PRN Glucose LESS THAN 70 milligrams/deciliter  ondansetron Injectable 4 milliGRAM(s) IV Push every 6 hours PRN Nausea and/or Vomiting  oxyCODONE    IR 5 milliGRAM(s) Oral every 4 hours PRN Moderate Pain (4 - 6)  oxyCODONE    IR 10 milliGRAM(s) Oral every 4 hours PRN Severe Pain (7 - 10)      PAST MEDICAL & SURGICAL HISTORY:  DM2 (diabetes mellitus, type 2)  Morbid obesity  Hypothyroidism  ESRD on peritoneal dialysis  HLD (hyperlipidemia)  History of partial gastrectomy  History of cholecystectomy  History of Porsha-en-Y gastric bypass      Vital Signs Last 24 Hrs  T(C): 36.6 (06 Feb 2019 09:58), Max: 36.8 (06 Feb 2019 01:30)  T(F): 97.8 (06 Feb 2019 09:58), Max: 98.2 (06 Feb 2019 01:30)  HR: 82 (06 Feb 2019 09:58) (74 - 82)  BP: 132/76 (06 Feb 2019 09:58) (132/76 - 155/80)  BP(mean): --  RR: 18 (06 Feb 2019 09:58) (18 - 19)  SpO2: 96% (06 Feb 2019 09:58) (96% - 98%)    I&O's Summary    05 Feb 2019 07:01  -  06 Feb 2019 07:00  --------------------------------------------------------  IN: 780 mL / OUT: 1900 mL / NET: -1120 mL    06 Feb 2019 07:01  -  06 Feb 2019 12:43  --------------------------------------------------------  IN: 380 mL / OUT: 500 mL / NET: -120 mL                              10.2   8.0   )-----------( 178      ( 06 Feb 2019 07:18 )             30.1     02-06    139  |  105  |  75<H>  ----------------------------<  90  4.3   |  19<L>  |  3.97<H>    Ca    7.3<L>      06 Feb 2019 07:16  Phos  4.6     02-06  Mg     2.3     02-06    TPro  6.7  /  Alb  3.1<L>  /  TBili  0.4  /  DBili  x   /  AST  69<H>  /  ALT  77<H>  /  AlkPhos  106  02-06    Tacrolimus (), Serum: 13.0 ng/mL (02-06 @ 08:58)      Review of systems  Gen: No weight changes, fatigue, fevers/chills, weakness  Skin: No rashes  Head/Eyes/Ears/Mouth: No headache; Normal hearing; Normal vision w/o blurriness; No sinus pain/discomfort, sore throat  Respiratory: No dyspnea, cough, wheezing, hemoptysis  CV: No chest pain, PND, orthopnea  GI: C/O moderate abdominal pain at surgical site. Denies diarrhea, constipation, nausea, vomiting, melena, hematochezia  : No increased frequency, dysuria, hematuria, nocturia  MSK: No joint pain/swelling; no back pain; some generalized edema  Neuro: No dizziness/lightheadedness, weakness, seizures, numbness, tingling  Heme: No easy bruising or bleeding  Endo: No heat/cold intolerance  Psych: No significant nervousness, anxiety, stress, depression  All other systems were reviewed and are negative, except as noted.      PHYSICAL EXAM:  Constitutional: Well developed / well nourished  Eyes: Anicteric, PERRLA  ENMT: nc/at  Neck: supple  Respiratory: CTA B/L  Cardiovascular: RRR  Gastrointestinal: Soft abdomen, ND, mild tender to touch at surgical site, +BS  Genitourinary: Voiding spontaneously, PD catheter in use  Extremities: SCD's in place and working bilaterally, trace BLE edema, + R knee swelling (stable since knee replacement)  Vascular: Palpable dp pulses bilaterally  Neurological: A&O x3  Skin: Mild serosanguinous ronn on wound dressing, no erythema and evidence of infection noted  Musculoskeletal: Moving all extremities  Psychiatric: Responsive Transplant Surgery - Multidisciplinary Rounds  --------------------------------------------------------------  Directed donation DDRT    Date: 2/1/19        POD# 6    Present:  Patient seen with multidisciplinary team icluding (Transplant Surgeon: Dr. Franklin, Dr. Omer, Dr. Napoles, Dr. Herbert, third year resident MAGO Capps. Transplant Nephrologist: Dr. Urbano, Dr. Waggoner, nephrology fellow NASH Ruff, Transplant Coordinators: Jonathan Casey, Charlene Block, Jamari (Voula) Irais Garcia. Social Work:  Brittyn Cross, Olivia Pedraza. Tricia Arthur (Living Donor SW/NOAH). Pharmacist: Jesus Barnett, pharmacy resident and student. Nutrition: Dina Canela. NP Yee Garnett, NP director Amy Nieves, RN, and RN manager in am rounds and examined with Dr. Herbert. Disciplines not in attendance will be notified of the plan.     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  Pt presents to [facility] as a direct admit for DDRT. Pt states he instilled 1 bag of PD fluid at 10PM PTA.  Pt continues to make urine.  The patient denies fever, chills; chest pain, SOB, palpitation; dizziness, weakness; nausea, vomiting; diarrhea, constipation; abdominal pain; dysuria, hematuria; leg swelling; sick contacts; and recent travel.  Now s/p directed donor renal transplant on 2/1/2019.  2 renal arteries anastomosed to iliac via carrel patch, 1 vein, 1 ureter.   Recipient:  PRA: 0%  ABO: O last HD: PD 22:00 (1/31)  Donor: [facility] donor incarcerated COD: Drug overdose, anoxia. Elevated terminal creatinine in donor kidney.  Donor ID: [id number]  60y/o  CMV - /EBV + right kidney ABO: O KDPI: 81% cross clamp time: 01/31/2019 16:32hours. Post op HD x1 without fluid removal and continue on PD. Ceftriaxone for positive donor culture x 3 days completed, pt cultures remains negative to date.    Interval Events:  Tolerating CHO restricted diet.  SMOG enema yesterday with minimal stool. PM Levemir decreased for 9pm glucose 97 and am glucose 87. +PD held last night.  Creatinine 3.97 today. 1.9L uop. Ambulating. Simulect yesterday.    Potential Discharge date:  2/6/19    Education:  Medications    Plan of care:  See Below    MEDICATIONS  (STANDING):  dextrose 5%. 1000 milliLiter(s) (50 mL/Hr) IV Continuous <Continuous>  dextrose 50% Injectable 12.5 Gram(s) IV Push once  dextrose 50% Injectable 25 Gram(s) IV Push once  dextrose 50% Injectable 25 Gram(s) IV Push once  docusate sodium 100 milliGRAM(s) Oral two times a day  famotidine    Tablet 20 milliGRAM(s) Oral daily  insulin detemir injectable (LEVEMIR) 25 Unit(s) SubCutaneous at bedtime  insulin lispro (HumaLOG) corrective regimen sliding scale   SubCutaneous Before meals and at bedtime  insulin lispro Injectable (HumaLOG) 5 Unit(s) SubCutaneous three times a day before meals  mycophenolate mofetil 1 Gram(s) Oral every 12 hours  nystatin    Suspension [id number] Unit(s) Swish and Swallow four times a day  predniSONE   Tablet 10 milliGRAM(s) Oral two times a day  senna 2 Tablet(s) Oral at bedtime  tacrolimus 6 milliGRAM(s) Oral two times a day  trimethoprim   80 mG/sulfamethoxazole 400 mG 1 Tablet(s) Oral daily  valGANciclovir 450 milliGRAM(s) Oral <User Schedule>    MEDICATIONS  (PRN):  acetaminophen   Tablet .. 650 milliGRAM(s) Oral every 6 hours PRN Mild Pain (1 - 3)  dextrose 40% Gel 15 Gram(s) Oral once PRN Blood Glucose LESS THAN 70 milliGRAM(s)/deciliter  glucagon  Injectable 1 milliGRAM(s) IntraMuscular once PRN Glucose LESS THAN 70 milligrams/deciliter  ondansetron Injectable 4 milliGRAM(s) IV Push every 6 hours PRN Nausea and/or Vomiting  oxyCODONE    IR 5 milliGRAM(s) Oral every 4 hours PRN Moderate Pain (4 - 6)  oxyCODONE    IR 10 milliGRAM(s) Oral every 4 hours PRN Severe Pain (7 - 10)      PAST MEDICAL & SURGICAL HISTORY:  DM2 (diabetes mellitus, type 2)  Morbid obesity  Hypothyroidism  ESRD on peritoneal dialysis  HLD (hyperlipidemia)  History of partial gastrectomy  History of cholecystectomy  History of Porsha-en-Y gastric bypass      Vital Signs Last 24 Hrs  T(C): 36.6 (06 Feb 2019 09:58), Max: 36.8 (06 Feb 2019 01:30)  T(F): 97.8 (06 Feb 2019 09:58), Max: 98.2 (06 Feb 2019 01:30)  HR: 82 (06 Feb 2019 09:58) (74 - 82)  BP: 132/76 (06 Feb 2019 09:58) (132/76 - 155/80)  BP(mean): --  RR: 18 (06 Feb 2019 09:58) (18 - 19)  SpO2: 96% (06 Feb 2019 09:58) (96% - 98%)    I&O's Summary    05 Feb 2019 07:01  -  06 Feb 2019 07:00  --------------------------------------------------------  IN: 780 mL / OUT: 1900 mL / NET: -1120 mL    06 Feb 2019 07:01  -  06 Feb 2019 12:43  --------------------------------------------------------  IN: 380 mL / OUT: 500 mL / NET: -120 mL                              10.2   8.0   )-----------( 178      ( 06 Feb 2019 07:18 )             30.1     02-06    139  |  105  |  75<H>  ----------------------------<  90  4.3   |  19<L>  |  3.97<H>    Ca    7.3<L>      06 Feb 2019 07:16  Phos  4.6     02-06  Mg     2.3     02-06    TPro  6.7  /  Alb  3.1<L>  /  TBili  0.4  /  DBili  x   /  AST  69<H>  /  ALT  77<H>  /  AlkPhos  106  02-06    Tacrolimus (), Serum: 13.0 ng/mL (02-06 @ 08:58)      Review of systems  Gen: No weight changes, fatigue, fevers/chills, weakness  Skin: No rashes  Head/Eyes/Ears/Mouth: No headache; Normal hearing; Normal vision w/o blurriness; No sinus pain/discomfort, sore throat  Respiratory: No dyspnea, cough, wheezing, hemoptysis  CV: No chest pain, PND, orthopnea  GI: C/O mild abdominal pain at surgical site. Denies diarrhea, constipation, nausea, vomiting, melena, hematochezia  : No increased frequency, dysuria, hematuria, nocturia  MSK: No joint pain/swelling; no back pain; some generalized edema  Neuro: No dizziness/lightheadedness, weakness, seizures, numbness, tingling  Heme: No easy bruising or bleeding  Endo: No heat/cold intolerance  Psych: No significant nervousness, anxiety, stress, depression  All other systems were reviewed and are negative, except as noted.      PHYSICAL EXAM:  Constitutional: Well developed / well nourished  Eyes: Anicteric, PERRLA  ENMT: nc/at  Neck: supple  Respiratory: CTA B/L  Cardiovascular: RRR  Gastrointestinal: Soft abdomen, ND, mild tender to touch at surgical site, +BS  Genitourinary: Voiding spontaneously, PD catheter patent  Extremities: SCD's in place and working bilaterally, no edema, + R knee swelling (stable since knee replacement)  Vascular: Palpable dp pulses bilaterally  Neurological: A&O x3  Skin: wound dressing without drainage, no erythema and evidence of infection noted  Musculoskeletal: Moving all extremities  Psychiatric: Responsive

## 2019-02-06 NOTE — PROVIDER CONTACT NOTE (OTHER) - BACKGROUND
Patient admitted to [facility] s/p DDRT. PMH: ESRD on PD last 6 months, DM.

## 2019-02-06 NOTE — PROGRESS NOTE ADULT - PROBLEM SELECTOR PROBLEM 1
Kidney transplant recipient
ABELARDO (acute kidney injury)
Kidney transplant recipient

## 2019-02-06 NOTE — PROGRESS NOTE ADULT - PROBLEM SELECTOR PLAN 2
Continue Tacrolimus, cellcept, steroid taper, Nystatin S&S, bactrim, and Valcyte TIW  F/U Tacrolimus level daily, tacro goal 8-10  Simulect induction next dose due day 4.
Continue Tacrolimus, cellcept, steroid taper, Nystatin S&S, bactrim, and Valcyte TIW  F/U Tacrolimus level daily, tacro goal 8-10  Simulect induction next dose due day 4.
S/p DDRT on 2/1/19. Donor was 59yo with terminal sCr ~3, ABO O, CMV-, EBV+, KDPI 81%, w/ COD drug overdose/anoxia.   - Currently with ATN.
S/p DDRT on 2/1/19. Donor was 60yo with terminal sCr ~3, ABO O, CMV-, EBV+, KDPI 81%, w/ COD drug overdose/anoxia.   - Currently with ATN.   - C/w PD
S/p DDRT on 2/1/19. Donor was 60yo with terminal sCr ~3, ABO O, CMV-, EBV+, KDPI 81%, w/ COD drug overdose/anoxia. Currently with ATN, now improving.  - Planned for PD catheter removal in 6 weeks.
simulect induction  On tacro for goal 7-10, MMF 1gm BID and steroid taper.
simulect induction  On tacro for goal 7-10, MMF 1gm BID and steroid taper.
Continue Tacrolimus, cellcept, steroid taper,  Nystatin S&S, bactrim, and Valcyte   F/U Tacrolimus level daily, tacro goal 8-10  Simulect induction next dose due day 4
Continue Tacrolimus, cellcept, steroid taper, Nystatin S&S, bactrim, and Valcyte TIW  F/U Tacrolimus level daily, tacro goal 8-10
Continue Tacrolimus, cellcept, steroid taper, Nystatin S&S, bactrim, and Valcyte TIW  F/U Tacrolimus level daily, tacro goal 8-10  Simulect induction next dose due day 4

## 2019-02-06 NOTE — DISCHARGE NOTE ADULT - MEDICATION SUMMARY - MEDICATIONS TO TAKE
I will START or STAY ON the medications listed below when I get home from the hospital:    predniSONE  -- 10mg by mouth tonight x 1 dose, then  5mg by mouth  x 2 doses x 1 day, then  5mg by mouth daily  -- Indication: For Diabetes    acetaminophen 325 mg oral tablet  -- 2 tab(s) by mouth every 6 hours, As needed, Mild Pain (1 - 3)  -- Indication: For pain    insulin detemir 100 units/mL subcutaneous solution  -- 25 unit(s) subcutaneous once a day (at bedtime)  -- Indication: For Diabetes    insulin lispro (concentrated) 200 units/mL subcutaneous solution  -- 5  subcutaneous 3 times a day (before meals)  -- Indication: For Diabetes    HumaLOG KwikPen 200 units/mL (Concentrated) subcutaneous solution  -- sliding scale  4 times a day (before meals and at bedtime)  2 units for -200  4 units for -250  6 units for  - 300  8 units for -350  10 units for -400  12 units for BS >400 and call MD  -- Indication: For Diabetes    clotrimazole 10 mg oral lozenge  -- 1 lozenge by mouth 2 times a day  -- Indication: For renal transplant    valGANciclovir 450 mg oral tablet  -- 1 tab(s) by mouth 3 times a week, every Monday, Wednesday, and Saturday  -- Indication: For renal transplant    famotidine 20 mg oral tablet  -- 1 tab(s) by mouth once a day  -- Indication: For GI    mycophenolate mofetil 250 mg oral capsule  -- 1000 milligram(s) by mouth 2 times a day  -- Indication: For renal transplant    tacrolimus 1 mg oral capsule  -- 4 cap(s) by mouth 2 times a day  -- Indication: For renal transplant    docusate sodium 100 mg oral capsule  -- 1 cap(s) by mouth 2 times a day  -- Indication: For GI    senna oral tablet  -- 2 tab(s) by mouth once a day (at bedtime)  -- Indication: For GI    sulfamethoxazole-trimethoprim 400 mg-80 mg oral tablet  -- 1 tab(s) by mouth once a day  -- Indication: For renal transplant

## 2019-02-06 NOTE — DISCHARGE NOTE ADULT - ADDITIONAL INSTRUCTIONS
1. Please call to make a follow-up appointment with Dr. Herbert on February, ********  2. Please follow up with your primary care physician in one week regarding your hospitalization. 1. Please call to make a follow-up appointment with Dr. Herbert on Tuesday, February 12, 2019  2. Please follow up with your primary care physician in one week regarding your hospitalization.

## 2019-02-06 NOTE — DISCHARGE NOTE ADULT - CARE PROVIDERS DIRECT ADDRESSES
[email or web address]

## 2019-02-06 NOTE — PROGRESS NOTE ADULT - NSHPATTENDINGPLANDISCUSS_GEN_ALL_CORE
patient, transplant team and nursing team
patient and transplant team
patient, transplant team and nursing team
Transplant team

## 2019-02-08 PROBLEM — E03.9 HYPOTHYROIDISM, UNSPECIFIED: Chronic | Status: ACTIVE | Noted: 2019-02-01

## 2019-02-08 PROBLEM — N18.6 END STAGE RENAL DISEASE: Chronic | Status: ACTIVE | Noted: 2019-02-01

## 2019-02-08 PROBLEM — E11.9 TYPE 2 DIABETES MELLITUS WITHOUT COMPLICATIONS: Chronic | Status: ACTIVE | Noted: 2019-02-01

## 2019-02-08 PROBLEM — E66.01 MORBID (SEVERE) OBESITY DUE TO EXCESS CALORIES: Chronic | Status: ACTIVE | Noted: 2019-02-01

## 2019-02-08 PROBLEM — E78.5 HYPERLIPIDEMIA, UNSPECIFIED: Chronic | Status: ACTIVE | Noted: 2019-02-01

## 2019-02-08 LAB
CULTURE RESULTS: SIGNIFICANT CHANGE UP
CULTURE RESULTS: SIGNIFICANT CHANGE UP
SPECIMEN SOURCE: SIGNIFICANT CHANGE UP
SPECIMEN SOURCE: SIGNIFICANT CHANGE UP

## 2019-02-12 ENCOUNTER — LABORATORY RESULT (OUTPATIENT)
Age: 74
End: 2019-02-12

## 2019-02-12 ENCOUNTER — APPOINTMENT (OUTPATIENT)
Dept: TRANSPLANT | Facility: CLINIC | Age: 74
End: 2019-02-12

## 2019-02-12 ENCOUNTER — APPOINTMENT (OUTPATIENT)
Dept: TRANSPLANT | Facility: CLINIC | Age: 74
End: 2019-02-12
Payer: MEDICARE

## 2019-02-12 VITALS
RESPIRATION RATE: 17 BRPM | WEIGHT: 243 LBS | DIASTOLIC BLOOD PRESSURE: 82 MMHG | SYSTOLIC BLOOD PRESSURE: 147 MMHG | TEMPERATURE: 97.8 F | BODY MASS INDEX: 34.02 KG/M2 | HEART RATE: 79 BPM | OXYGEN SATURATION: 98 % | HEIGHT: 71 IN

## 2019-02-12 DIAGNOSIS — Z01.818 ENCOUNTER FOR OTHER PREPROCEDURAL EXAMINATION: ICD-10-CM

## 2019-02-12 LAB
ALBUMIN SERPL ELPH-MCNC: 3.1 G/DL
ALP BLD-CCNC: 80 U/L
ALT SERPL-CCNC: 20 U/L
ANION GAP SERPL CALC-SCNC: 10 MMOL/L
APPEARANCE: ABNORMAL
AST SERPL-CCNC: 17 U/L
BILIRUB SERPL-MCNC: 0.3 MG/DL
BILIRUBIN URINE: NEGATIVE
BLOOD URINE: ABNORMAL
BUN SERPL-MCNC: 40 MG/DL
CALCIUM SERPL-MCNC: 7.8 MG/DL
CHLORIDE SERPL-SCNC: 112 MMOL/L
CO2 SERPL-SCNC: 17 MMOL/L
COLOR: YELLOW
CREAT SERPL-MCNC: 2.47 MG/DL
CREAT SPEC-SCNC: 113 MG/DL
CREAT/PROT UR: 0.4 RATIO
GLUCOSE QUALITATIVE U: NEGATIVE MG/DL
GLUCOSE SERPL-MCNC: 208 MG/DL
KETONES URINE: NEGATIVE
LDH SERPL-CCNC: 216 U/L
LEUKOCYTE ESTERASE URINE: NEGATIVE
MAGNESIUM SERPL-MCNC: 2 MG/DL
NITRITE URINE: NEGATIVE
PH URINE: 5
PHOSPHATE SERPL-MCNC: 3.4 MG/DL
POTASSIUM SERPL-SCNC: 6.2 MMOL/L
PROT SERPL-MCNC: 6.4 G/DL
PROT UR-MCNC: 46 MG/DL
PROTEIN URINE: 30 MG/DL
SODIUM SERPL-SCNC: 139 MMOL/L
SPECIFIC GRAVITY URINE: 1.02
TACROLIMUS SERPL-MCNC: 21.6 NG/ML
URATE SERPL-MCNC: 6.4 MG/DL
UROBILINOGEN URINE: NEGATIVE MG/DL

## 2019-02-12 PROCEDURE — 99215 OFFICE O/P EST HI 40 MIN: CPT | Mod: 24

## 2019-02-12 RX ORDER — TACROLIMUS 5 MG/1
5 CAPSULE ORAL TWICE DAILY
Qty: 60 | Refills: 11 | Status: DISCONTINUED | COMMUNITY
Start: 2019-02-05 | End: 2019-02-12

## 2019-02-12 RX ORDER — TACROLIMUS 0.5 MG/1
0.5 CAPSULE ORAL
Qty: 60 | Refills: 11 | Status: DISCONTINUED | COMMUNITY
Start: 2019-02-05 | End: 2019-02-12

## 2019-02-12 RX ORDER — INSULIN LISPRO 100 [IU]/ML
100 INJECTION, SOLUTION INTRAVENOUS; SUBCUTANEOUS
Qty: 5 | Refills: 11 | Status: DISCONTINUED | COMMUNITY
Start: 2019-02-05 | End: 2019-02-12

## 2019-02-12 NOTE — REASON FOR VISIT
[de-identified] : 72yo M sp DDRTx on 2/1 with 61yo KDPI 81% ABELARDO graft (DONOR ID [id number], Match ID [id number]).\par \par INTERVAL EVENTS: \par Having soumya-incisional pain.  Small leak from incision with first bandage change, none since.  No fevers.  Has cough with mild sputum production.  Urinating very frequently, no dysuria.   Good glycemic control.  No HTN.\par \par REVIEW OF SYSTEMS:\par The patient denies any fever, weight change. Denies any sore throat, ear pain, rhinorrhea. Denies any double vision, blurred vision or eye pain. Denies any shortness of breath or pleuritic chest pain. The patient denies any nausea, vomiting or diarrhea. The patient does not have any dysuria, frequency or urgency. He has no myalgia or joint pain\par \par

## 2019-02-12 NOTE — ASSESSMENT
[FreeTextEntry1] : 74yo M sp DDRTx. \par \par Graft - euvolemic, suspect good graft function.  Labs today\par Immuno - tac goal 8-10, cellcept 1gm BID, pred 5mg\par DM - good glycemic control.  On Lantus 25 qPM,Novolog 5qmeal\par HTN - well controlled.  Not on meds.\par PPx - clotrimazole BID, Valcyte qd, Bactrim\par Cough - will continue to monitor, lungs sound clear

## 2019-02-12 NOTE — PHYSICAL EXAM
[General Appearance - Alert] : alert [General Appearance - Well Nourished] : well nourished [General Appearance - Well Developed] : well developed [] : no respiratory distress [Auscultation Breath Sounds / Voice Sounds] : lungs were clear to auscultation bilaterally [Abdomen Soft] : soft [Abdomen Tenderness] : non-tender [Abdomen Mass (___ Cm)] : no abdominal mass palpated [Abdomen Hernia] : no hernia was discovered [Site: ___] : Site: [unfilled] [Dry] : dry [No Edema] : no edema [Oriented To Time, Place, And Person] : oriented to person, place, and time [FreeTextEntry1] : No surrounding erythema, incision covered in pannus, non malodorant, non tender

## 2019-02-13 LAB
BASOPHILS # BLD AUTO: 0.05 K/UL
BASOPHILS NFR BLD AUTO: 0.6 %
BKV DNA SPEC QL NAA+PROBE: NOT DETECTED COPIES/ML
EOSINOPHIL # BLD AUTO: 0.22 K/UL
EOSINOPHIL NFR BLD AUTO: 2.8 %
HCT VFR BLD CALC: 33.3 %
HGB BLD-MCNC: 9.9 G/DL
IMM GRANULOCYTES NFR BLD AUTO: 0.4 %
LYMPHOCYTES # BLD AUTO: 1.01 K/UL
LYMPHOCYTES NFR BLD AUTO: 12.8 %
MAN DIFF?: NORMAL
MCHC RBC-ENTMCNC: 29.7 GM/DL
MCHC RBC-ENTMCNC: 30.9 PG
MCV RBC AUTO: 104.1 FL
MONOCYTES # BLD AUTO: 0.36 K/UL
MONOCYTES NFR BLD AUTO: 4.6 %
NEUTROPHILS # BLD AUTO: 6.21 K/UL
NEUTROPHILS NFR BLD AUTO: 78.8 %
PLATELET # BLD AUTO: 244 K/UL
RBC # BLD: 3.2 M/UL
RBC # FLD: 14.4 %
WBC # FLD AUTO: 7.88 K/UL

## 2019-02-15 ENCOUNTER — APPOINTMENT (OUTPATIENT)
Dept: TRANSPLANT | Facility: CLINIC | Age: 74
End: 2019-02-15

## 2019-02-18 LAB
ALBUMIN SERPL ELPH-MCNC: 3.4 G/DL
ALP BLD-CCNC: 85 U/L
ALT SERPL-CCNC: 16 U/L
ANION GAP SERPL CALC-SCNC: 10 MMOL/L
APPEARANCE: CLEAR
AST SERPL-CCNC: 19 U/L
BACTERIA: NEGATIVE
BASOPHILS # BLD AUTO: 0.07 K/UL
BASOPHILS NFR BLD AUTO: 0.9 %
BILIRUB SERPL-MCNC: 0.2 MG/DL
BILIRUBIN URINE: NEGATIVE
BKV DNA SPEC QL NAA+PROBE: NOT DETECTED COPIES/ML
BLOOD URINE: ABNORMAL
BUN SERPL-MCNC: 36 MG/DL
CALCIUM SERPL-MCNC: 7.8 MG/DL
CHLORIDE SERPL-SCNC: 116 MMOL/L
CO2 SERPL-SCNC: 17 MMOL/L
COLOR: YELLOW
CREAT SERPL-MCNC: 2.37 MG/DL
CREAT SPEC-SCNC: 42 MG/DL
CREAT/PROT UR: 0.3 RATIO
EOSINOPHIL # BLD AUTO: 0.11 K/UL
EOSINOPHIL NFR BLD AUTO: 1.4 %
GLUCOSE QUALITATIVE U: NEGATIVE MG/DL
GLUCOSE SERPL-MCNC: 184 MG/DL
HCT VFR BLD CALC: 33 %
HGB BLD-MCNC: 10.1 G/DL
HYALINE CASTS: 4 /LPF
IMM GRANULOCYTES NFR BLD AUTO: 0.2 %
KETONES URINE: NEGATIVE
LDH SERPL-CCNC: 255 U/L
LEUKOCYTE ESTERASE URINE: NEGATIVE
LYMPHOCYTES # BLD AUTO: 0.89 K/UL
LYMPHOCYTES NFR BLD AUTO: 10.9 %
MAGNESIUM SERPL-MCNC: 1.7 MG/DL
MAN DIFF?: NORMAL
MCHC RBC-ENTMCNC: 30.6 GM/DL
MCHC RBC-ENTMCNC: 31.8 PG
MCV RBC AUTO: 103.8 FL
MICROSCOPIC-UA: NORMAL
MONOCYTES # BLD AUTO: 0.4 K/UL
MONOCYTES NFR BLD AUTO: 4.9 %
NEUTROPHILS # BLD AUTO: 6.65 K/UL
NEUTROPHILS NFR BLD AUTO: 81.7 %
NITRITE URINE: NEGATIVE
PH URINE: 5.5
PHOSPHATE SERPL-MCNC: 3.7 MG/DL
PLATELET # BLD AUTO: 230 K/UL
POTASSIUM SERPL-SCNC: 5 MMOL/L
PROT SERPL-MCNC: 6.9 G/DL
PROT UR-MCNC: 13 MG/DL
PROTEIN URINE: NEGATIVE MG/DL
RBC # BLD: 3.18 M/UL
RBC # FLD: 14.7 %
RED BLOOD CELLS URINE: 6 /HPF
SODIUM SERPL-SCNC: 143 MMOL/L
SPECIFIC GRAVITY URINE: 1.01
SQUAMOUS EPITHELIAL CELLS: 1 /HPF
TACROLIMUS SERPL-MCNC: 13.5 NG/ML
URATE SERPL-MCNC: 7.2 MG/DL
UROBILINOGEN URINE: NEGATIVE MG/DL
WBC # FLD AUTO: 8.14 K/UL
WHITE BLOOD CELLS URINE: 2 /HPF

## 2019-02-19 ENCOUNTER — LABORATORY RESULT (OUTPATIENT)
Age: 74
End: 2019-02-19

## 2019-02-19 ENCOUNTER — APPOINTMENT (OUTPATIENT)
Dept: NEPHROLOGY | Facility: CLINIC | Age: 74
End: 2019-02-19
Payer: MEDICARE

## 2019-02-19 VITALS
RESPIRATION RATE: 20 BRPM | HEIGHT: 71 IN | OXYGEN SATURATION: 96 % | BODY MASS INDEX: 33.6 KG/M2 | SYSTOLIC BLOOD PRESSURE: 135 MMHG | TEMPERATURE: 97.9 F | HEART RATE: 78 BPM | WEIGHT: 240 LBS | DIASTOLIC BLOOD PRESSURE: 78 MMHG

## 2019-02-19 PROCEDURE — 99215 OFFICE O/P EST HI 40 MIN: CPT

## 2019-02-19 NOTE — HISTORY OF PRESENT ILLNESS
[FreeTextEntry1] : 73 years old male, born in [place], [place]. \par He received  donor kidney transplant on 19 at [facility] (Surgeon-Srikanth Herbert)\par He was on peritoneal dialysis for 3 months and 1.5 years on hemodialysis prior to that prior to transplant. ESRD, kidney stones,  diabetic. No CAD/stents. Pre transplant was on insulin. Has hypothyroidism.\par Other pre transplant issues that need follow up: reviewed. 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 He started being diabetic after the stroid use.He became 735 Lbs, and had gastric bypass in  for weight loss. (Dr. Mckeon). Also had gastric ulcer surgery. Has left arm  AVF.\par Donor characteristics reviewed.\par Donor:  Directed donor.  he is president of a senior club and one of the members is the  donor's neighbor.\par UNOS Donor ID: [id number]\par Match: [id number]\par OPO:  [place]\par Donor Age: 59\par ABO:  O\par KDPI: 81%\par COD: anoxia – drug intoxication\par Medical Hx: Asthma, Depression, drug use (cocaine), ETOH\par Terminal Scr ~2.8-3.0\par CMV- /EBV +\par Donor age-59\par Cold Ischemia time : noted\par CMV status: Donor negative\par Anatomy unremarkable\par Has ureteral stent. Has staples.\par PRA was 0%. HLA Match: Reviewed.\par Induction regimen noted. Simulect induction, Tac/MMF/Prednisone\par Currently taking Tacrolimus 1.5 mg am and 1.5 mg PM, cellcept 1 gm /dose and prednisone 5.\par He is on prophylaxis regimen: Bactrim, Valcyte, Clotrimazole\par Blood pressure control: On no meds\par Glucose control regimen: Levimir 25 units taking once daily AM and Novolog 5 u pre meal.\par Post op course: \par Delayed allograft function, had PD initially.\par \par Had post op visit with surgeon. \par \par Currently\par He lives by himself, sister lives near by and has two friends, Alek and Stefanie live near him.\par Has no fever, no urinary symptoms except nocturia: 4-5 times/night at present\par Home blood pressure, temp, glucose and weight flow sheet reviewed.\par Occasional cough present. No expectoration. No SOB/chest pain. Has lose bowel movements. Has stopped taking miralax.\par Home glucose: Reviewed.\par Medications side effects: none noted.\par Adherence and understanding: Fair\par Was started on Lasix 40 twice daily for hyperkalemia. \par \par Functional /Employment status: Reviewed. Retired from [employer]. 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 Gagnon, [facility], previously Dr. Jones\par Dialysis Unit: [facility]\par Endocrinologist: Gualberto Clark\par

## 2019-02-19 NOTE — PHYSICAL EXAM
[General Appearance - Alert] : alert [General Appearance - In No Acute Distress] : in no acute distress [Sclera] : the sclera and conjunctiva were normal [PERRL With Normal Accommodation] : pupils were equal in size, round, and reactive to light [Extraocular Movements] : extraocular movements were intact [Outer Ear] : the ears and nose were normal in appearance [Oropharynx] : the oropharynx was normal [Neck Appearance] : the appearance of the neck was normal [Neck Cervical Mass (___cm)] : no neck mass was observed [Jugular Venous Distention Increased] : there was no jugular-venous distention [Thyroid Diffuse Enlargement] : the thyroid was not enlarged [Thyroid Nodule] : there were no palpable thyroid nodules [Auscultation Breath Sounds / Voice Sounds] : lungs were clear to auscultation bilaterally [Heart Rate And Rhythm] : heart rate was normal and rhythm regular [Heart Sounds] : normal S1 and S2 [Heart Sounds Gallop] : no gallops [Murmurs] : no murmurs [Heart Sounds Pericardial Friction Rub] : no pericardial rub [Edema] : there was no peripheral edema [Bowel Sounds] : normal bowel sounds [Abdomen Soft] : soft [Abdomen Tenderness] : non-tender [Cervical Lymph Nodes Enlarged Posterior Bilaterally] : posterior cervical [Cervical Lymph Nodes Enlarged Anterior Bilaterally] : anterior cervical [Supraclavicular Lymph Nodes Enlarged Bilaterally] : supraclavicular [Axillary Lymph Nodes Enlarged Bilaterally] : axillary [Inguinal Lymph Nodes Enlarged Bilaterally] : inguinal [Involuntary Movements] : no involuntary movements were seen [Peritoneal Dialysis Catheter] : peritoneal dialysis catheter [] : no rash [No Focal Deficits] : no focal deficits [Oriented To Time, Place, And Person] : oriented to person, place, and time [Impaired Insight] : insight and judgment were intact [Affect] : the affect was normal [FreeTextEntry1] : PD catheter , RLQ surgical incision, with staples present.

## 2019-02-19 NOTE — ASSESSMENT
[FreeTextEntry1] : Renal Transplant recipient: Had immediate allograft function, normal creatinine at discharge. Has urinary frequency and nocturia. No dysuria/hematuria. No fever/chills. Tolerating medications.\par Immunosuppression: reviewed; Simulect induction, on tac/MMF/prednisone, last Tac level noted; will recheck. Currently on 1.5 mg twice daily.; full dose MMF and prednisone at 5 mg daily\par DM: On Levemir 25 u   daily and NovoLog 5 units pre meal.  He was taking novolog 4 times daily, switched to 3 times daily premeal. Levemir he is taking it in the morning.\par Will continue to monitor and adjust treatment\par Gave flow sheets to maintain home charts of glucose , blood pressure, temperature, weight and urine output.\par  Hyperlipidemia: Previously on Welchol had elevated LFT and joint/muscle pain with Lipitor. Will follow lipids\par Cardiovascular risk reduction discussed.  \par Infection prophylaxis: On Bactrim, Valcyte and clotrimazole as well as GI prophylaxis.\par Discussed ambulation, using incentive spirometer, optimal glucose and blood pressure readings, adherence with medications and follow ups, follow up clinic visit schedule, avoiding dehydration, mosquito bites; prevention of DVT as well as food safety.\par He has met with transplant surgeon in the past; post op wound care, staples removal and ureteral stent removal were discussed.\par

## 2019-02-20 LAB
ALBUMIN SERPL ELPH-MCNC: 3.7 G/DL
ALP BLD-CCNC: 96 U/L
ALT SERPL-CCNC: 15 U/L
ANION GAP SERPL CALC-SCNC: 12 MMOL/L
APPEARANCE: ABNORMAL
AST SERPL-CCNC: 17 U/L
BACTERIA: NEGATIVE
BASOPHILS # BLD AUTO: 0.09 K/UL
BASOPHILS NFR BLD AUTO: 1.3 %
BILIRUB SERPL-MCNC: 0.3 MG/DL
BILIRUBIN URINE: NEGATIVE
BLOOD URINE: ABNORMAL
BUN SERPL-MCNC: 26 MG/DL
CALCIUM SERPL-MCNC: 7.6 MG/DL
CHLORIDE SERPL-SCNC: 113 MMOL/L
CO2 SERPL-SCNC: 18 MMOL/L
COLOR: YELLOW
CREAT SERPL-MCNC: 1.78 MG/DL
EOSINOPHIL # BLD AUTO: 0.25 K/UL
EOSINOPHIL NFR BLD AUTO: 3.6 %
GLUCOSE QUALITATIVE U: NEGATIVE
GLUCOSE SERPL-MCNC: 73 MG/DL
HCT VFR BLD CALC: 33.7 %
HGB BLD-MCNC: 9.6 G/DL
HYALINE CASTS: 2 /LPF
IMM GRANULOCYTES NFR BLD AUTO: 0.1 %
KETONES URINE: NEGATIVE
LDH SERPL-CCNC: 223 U/L
LEUKOCYTE ESTERASE URINE: NEGATIVE
LYMPHOCYTES # BLD AUTO: 2.13 K/UL
LYMPHOCYTES NFR BLD AUTO: 30.5 %
MAGNESIUM SERPL-MCNC: 1.4 MG/DL
MAN DIFF?: NORMAL
MCHC RBC-ENTMCNC: 28.5 GM/DL
MCHC RBC-ENTMCNC: 31 PG
MCV RBC AUTO: 108.7 FL
MICROSCOPIC-UA: NORMAL
MONOCYTES # BLD AUTO: 0.55 K/UL
MONOCYTES NFR BLD AUTO: 7.9 %
NEUTROPHILS # BLD AUTO: 3.96 K/UL
NEUTROPHILS NFR BLD AUTO: 56.6 %
NITRITE URINE: NEGATIVE
PH URINE: 6
PHOSPHATE SERPL-MCNC: 2.8 MG/DL
PLATELET # BLD AUTO: 212 K/UL
POTASSIUM SERPL-SCNC: 4.6 MMOL/L
PROT SERPL-MCNC: 6.8 G/DL
PROTEIN URINE: ABNORMAL
RBC # BLD: 3.1 M/UL
RBC # FLD: 14.6 %
RED BLOOD CELLS URINE: 32 /HPF
SODIUM SERPL-SCNC: 143 MMOL/L
SPECIFIC GRAVITY URINE: 1.02
SQUAMOUS EPITHELIAL CELLS: 1 /HPF
TACROLIMUS SERPL-MCNC: 8.4 NG/ML
URATE SERPL-MCNC: 8.2 MG/DL
UROBILINOGEN URINE: NORMAL
WBC # FLD AUTO: 6.99 K/UL
WHITE BLOOD CELLS URINE: 3 /HPF

## 2019-02-20 RX ORDER — PEN NEEDLE, DIABETIC 29 G X1/2"
32G X 4 MM NEEDLE, DISPOSABLE MISCELLANEOUS
Qty: 1 | Refills: 3 | Status: ACTIVE | COMMUNITY
Start: 2018-10-30 | End: 1900-01-01

## 2019-02-20 RX ORDER — INSULIN ASPART 100 [IU]/ML
100 INJECTION, SOLUTION INTRAVENOUS; SUBCUTANEOUS
Qty: 5 | Refills: 11 | Status: DISCONTINUED | COMMUNITY
Start: 2019-02-12 | End: 2019-02-20

## 2019-02-21 ENCOUNTER — APPOINTMENT (OUTPATIENT)
Dept: NEPHROLOGY | Facility: CLINIC | Age: 74
End: 2019-02-21

## 2019-02-21 LAB
BKV DNA SPEC QL NAA+PROBE: NOT DETECTED COPIES/ML
CREAT SPEC-SCNC: 103 MG/DL
CREAT/PROT UR: 0.3 RATIO
PROT UR-MCNC: 32 MG/DL

## 2019-02-27 ENCOUNTER — APPOINTMENT (OUTPATIENT)
Dept: TRANSPLANT | Facility: CLINIC | Age: 74
End: 2019-02-27

## 2019-02-27 VITALS
TEMPERATURE: 98.1 F | BODY MASS INDEX: 33.32 KG/M2 | SYSTOLIC BLOOD PRESSURE: 148 MMHG | DIASTOLIC BLOOD PRESSURE: 83 MMHG | HEIGHT: 71 IN | OXYGEN SATURATION: 97 % | HEART RATE: 73 BPM | RESPIRATION RATE: 18 BRPM | WEIGHT: 238 LBS

## 2019-02-27 LAB
APPEARANCE: CLEAR
BACTERIA: NEGATIVE
BILIRUBIN URINE: NEGATIVE
BLOOD URINE: NEGATIVE
COLOR: NORMAL
GLUCOSE QUALITATIVE U: NORMAL
HYALINE CASTS: 0 /LPF
KETONES URINE: NEGATIVE
LEUKOCYTE ESTERASE URINE: NEGATIVE
MICROSCOPIC-UA: NORMAL
NITRITE URINE: NEGATIVE
PH URINE: 6
PROTEIN URINE: NEGATIVE
RED BLOOD CELLS URINE: 3 /HPF
SPECIFIC GRAVITY URINE: 1.01
SQUAMOUS EPITHELIAL CELLS: 0 /HPF
UROBILINOGEN URINE: NORMAL
WHITE BLOOD CELLS URINE: 1 /HPF

## 2019-02-27 RX ORDER — FUROSEMIDE 20 MG/1
20 TABLET ORAL
Qty: 90 | Refills: 0 | Status: DISCONTINUED | COMMUNITY
Start: 2019-01-08 | End: 2019-02-27

## 2019-02-27 RX ORDER — DOCUSATE SODIUM 100 MG/1
100 CAPSULE ORAL TWICE DAILY
Qty: 60 | Refills: 11 | Status: DISCONTINUED | COMMUNITY
Start: 2019-02-05 | End: 2019-02-27

## 2019-02-27 RX ORDER — SENNOSIDES 8.6 MG TABLETS 8.6 MG/1
8.6 TABLET ORAL
Qty: 30 | Refills: 11 | Status: DISCONTINUED | COMMUNITY
Start: 2019-02-05 | End: 2019-02-27

## 2019-02-28 ENCOUNTER — APPOINTMENT (OUTPATIENT)
Dept: NEPHROLOGY | Facility: CLINIC | Age: 74
End: 2019-02-28

## 2019-02-28 DIAGNOSIS — E83.42 HYPOMAGNESEMIA: ICD-10-CM

## 2019-03-04 LAB
ALBUMIN SERPL ELPH-MCNC: 3.8 G/DL
ALP BLD-CCNC: 100 U/L
ALT SERPL-CCNC: 13 U/L
ANION GAP SERPL CALC-SCNC: 11 MMOL/L
AST SERPL-CCNC: 14 U/L
BASOPHILS # BLD AUTO: 0.04 K/UL
BASOPHILS NFR BLD AUTO: 0.7 %
BILIRUB SERPL-MCNC: 0.2 MG/DL
BKV DNA SPEC QL NAA+PROBE: NOT DETECTED COPIES/ML
BUN SERPL-MCNC: 20 MG/DL
CALCIUM SERPL-MCNC: 8.1 MG/DL
CHLORIDE SERPL-SCNC: 112 MMOL/L
CMV DNA SPEC QL NAA+PROBE: NOT DETECTED
CMVPCR LOG: NOT DETECTED LOGIU/ML
CO2 SERPL-SCNC: 20 MMOL/L
CREAT SERPL-MCNC: 1.31 MG/DL
CREAT SPEC-SCNC: 42 MG/DL
CREAT/PROT UR: 0.3 RATIO
EOSINOPHIL # BLD AUTO: 0.1 K/UL
EOSINOPHIL NFR BLD AUTO: 1.9 %
GLUCOSE SERPL-MCNC: 137 MG/DL
HCT VFR BLD CALC: 32 %
HGB BLD-MCNC: 9.6 G/DL
IMM GRANULOCYTES NFR BLD AUTO: 0.4 %
LDH SERPL-CCNC: 184 U/L
LYMPHOCYTES # BLD AUTO: 1.01 K/UL
LYMPHOCYTES NFR BLD AUTO: 18.9 %
MAGNESIUM SERPL-MCNC: 1.4 MG/DL
MAN DIFF?: NORMAL
MCHC RBC-ENTMCNC: 30 GM/DL
MCHC RBC-ENTMCNC: 31.8 PG
MCV RBC AUTO: 106 FL
MONOCYTES # BLD AUTO: 0.32 K/UL
MONOCYTES NFR BLD AUTO: 6 %
NEUTROPHILS # BLD AUTO: 3.85 K/UL
NEUTROPHILS NFR BLD AUTO: 72.1 %
PHOSPHATE SERPL-MCNC: 1.8 MG/DL
PLATELET # BLD AUTO: 222 K/UL
POTASSIUM SERPL-SCNC: 4.7 MMOL/L
PROT SERPL-MCNC: 7 G/DL
PROT UR-MCNC: 10 MG/DL
RBC # BLD: 3.02 M/UL
RBC # FLD: 14.5 %
SODIUM SERPL-SCNC: 143 MMOL/L
TACROLIMUS SERPL-MCNC: 9.9 NG/ML
WBC # FLD AUTO: 5.34 K/UL

## 2019-03-07 ENCOUNTER — INPATIENT (INPATIENT)
Facility: HOSPITAL | Age: 74
LOS: 2 days | Discharge: HOME | DRG: 378 | End: 2019-03-10
Attending: TRANSPLANT SURGERY | Admitting: TRANSPLANT SURGERY
Payer: MEDICARE

## 2019-03-07 ENCOUNTER — APPOINTMENT (OUTPATIENT)
Dept: NEPHROLOGY | Facility: CLINIC | Age: 74
End: 2019-03-07

## 2019-03-07 VITALS
OXYGEN SATURATION: 98 % | DIASTOLIC BLOOD PRESSURE: 69 MMHG | RESPIRATION RATE: 18 BRPM | SYSTOLIC BLOOD PRESSURE: 138 MMHG | HEIGHT: 71 IN | WEIGHT: 238.98 LBS | HEART RATE: 75 BPM | TEMPERATURE: 98 F

## 2019-03-07 DIAGNOSIS — Z90.49 ACQUIRED ABSENCE OF OTHER SPECIFIED PARTS OF DIGESTIVE TRACT: Chronic | ICD-10-CM

## 2019-03-07 DIAGNOSIS — Z98.84 BARIATRIC SURGERY STATUS: Chronic | ICD-10-CM

## 2019-03-07 DIAGNOSIS — Z90.3 ACQUIRED ABSENCE OF STOMACH [PART OF]: Chronic | ICD-10-CM

## 2019-03-07 DIAGNOSIS — N18.9 CHRONIC KIDNEY DISEASE, UNSPECIFIED: ICD-10-CM

## 2019-03-07 LAB
ANION GAP SERPL CALC-SCNC: 11 MMOL/L — SIGNIFICANT CHANGE UP (ref 5–17)
APTT BLD: 25.4 SEC — LOW (ref 27.5–36.3)
BLD GP AB SCN SERPL QL: NEGATIVE — SIGNIFICANT CHANGE UP
BUN SERPL-MCNC: 35 MG/DL — HIGH (ref 7–23)
CALCIUM SERPL-MCNC: 8.4 MG/DL — SIGNIFICANT CHANGE UP (ref 8.4–10.5)
CHLORIDE SERPL-SCNC: 112 MMOL/L — HIGH (ref 96–108)
CO2 SERPL-SCNC: 19 MMOL/L — LOW (ref 22–31)
CREAT SERPL-MCNC: 1.33 MG/DL — HIGH (ref 0.5–1.3)
GAS PNL BLDV: SIGNIFICANT CHANGE UP
GLUCOSE BLDC GLUCOMTR-MCNC: 162 MG/DL — HIGH (ref 70–99)
GLUCOSE SERPL-MCNC: 160 MG/DL — HIGH (ref 70–99)
HCT VFR BLD CALC: 25.9 % — LOW (ref 39–50)
HGB BLD-MCNC: 8.8 G/DL — LOW (ref 13–17)
MAGNESIUM SERPL-MCNC: 1.7 MG/DL — SIGNIFICANT CHANGE UP (ref 1.6–2.6)
MCHC RBC-ENTMCNC: 33.1 PG — SIGNIFICANT CHANGE UP (ref 27–34)
MCHC RBC-ENTMCNC: 34.2 GM/DL — SIGNIFICANT CHANGE UP (ref 32–36)
MCV RBC AUTO: 96.8 FL — SIGNIFICANT CHANGE UP (ref 80–100)
PHOSPHATE SERPL-MCNC: 2.4 MG/DL — LOW (ref 2.5–4.5)
PLATELET # BLD AUTO: 189 K/UL — SIGNIFICANT CHANGE UP (ref 150–400)
POTASSIUM SERPL-MCNC: 5.5 MMOL/L — HIGH (ref 3.5–5.3)
POTASSIUM SERPL-SCNC: 5.5 MMOL/L — HIGH (ref 3.5–5.3)
RBC # BLD: 2.67 M/UL — LOW (ref 4.2–5.8)
RBC # FLD: 15.1 % — HIGH (ref 10.3–14.5)
RH IG SCN BLD-IMP: POSITIVE — SIGNIFICANT CHANGE UP
SODIUM SERPL-SCNC: 142 MMOL/L — SIGNIFICANT CHANGE UP (ref 135–145)
WBC # BLD: 8.2 K/UL — SIGNIFICANT CHANGE UP (ref 3.8–10.5)
WBC # FLD AUTO: 8.2 K/UL — SIGNIFICANT CHANGE UP (ref 3.8–10.5)

## 2019-03-07 PROCEDURE — 93010 ELECTROCARDIOGRAM REPORT: CPT

## 2019-03-07 RX ORDER — INSULIN LISPRO 100/ML
VIAL (ML) SUBCUTANEOUS EVERY 6 HOURS
Qty: 0 | Refills: 0 | Status: DISCONTINUED | OUTPATIENT
Start: 2019-03-07 | End: 2019-03-10

## 2019-03-07 RX ORDER — SODIUM CHLORIDE 9 MG/ML
1000 INJECTION INTRAMUSCULAR; INTRAVENOUS; SUBCUTANEOUS
Qty: 0 | Refills: 0 | Status: DISCONTINUED | OUTPATIENT
Start: 2019-03-07 | End: 2019-03-09

## 2019-03-07 RX ORDER — VALGANCICLOVIR 450 MG/1
450 TABLET, FILM COATED ORAL DAILY
Qty: 0 | Refills: 0 | Status: DISCONTINUED | OUTPATIENT
Start: 2019-03-07 | End: 2019-03-10

## 2019-03-07 RX ORDER — MAGNESIUM SULFATE 500 MG/ML
2 VIAL (ML) INJECTION ONCE
Qty: 0 | Refills: 0 | Status: COMPLETED | OUTPATIENT
Start: 2019-03-07 | End: 2019-03-07

## 2019-03-07 RX ORDER — PANTOPRAZOLE SODIUM 20 MG/1
8 TABLET, DELAYED RELEASE ORAL
Qty: 80 | Refills: 0 | Status: DISCONTINUED | OUTPATIENT
Start: 2019-03-07 | End: 2019-03-08

## 2019-03-07 RX ORDER — TACROLIMUS 5 MG/1
1.5 CAPSULE ORAL
Qty: 0 | Refills: 0 | Status: DISCONTINUED | OUTPATIENT
Start: 2019-03-07 | End: 2019-03-10

## 2019-03-07 RX ORDER — PANTOPRAZOLE SODIUM 20 MG/1
80 TABLET, DELAYED RELEASE ORAL ONCE
Qty: 0 | Refills: 0 | Status: COMPLETED | OUTPATIENT
Start: 2019-03-07 | End: 2019-03-07

## 2019-03-07 RX ORDER — MORPHINE SULFATE 50 MG/1
2 CAPSULE, EXTENDED RELEASE ORAL ONCE
Qty: 0 | Refills: 0 | Status: DISCONTINUED | OUTPATIENT
Start: 2019-03-07 | End: 2019-03-07

## 2019-03-07 RX ORDER — INSULIN DETEMIR 100/ML (3)
12 INSULIN PEN (ML) SUBCUTANEOUS DAILY
Qty: 0 | Refills: 0 | Status: DISCONTINUED | OUTPATIENT
Start: 2019-03-07 | End: 2019-03-09

## 2019-03-07 RX ADMIN — MORPHINE SULFATE 2 MILLIGRAM(S): 50 CAPSULE, EXTENDED RELEASE ORAL at 21:49

## 2019-03-07 RX ADMIN — MORPHINE SULFATE 2 MILLIGRAM(S): 50 CAPSULE, EXTENDED RELEASE ORAL at 21:19

## 2019-03-07 RX ADMIN — PANTOPRAZOLE SODIUM 80 MILLIGRAM(S): 20 TABLET, DELAYED RELEASE ORAL at 22:16

## 2019-03-07 RX ADMIN — Medication 50 GRAM(S): at 23:56

## 2019-03-07 RX ADMIN — SODIUM CHLORIDE 120 MILLILITER(S): 9 INJECTION INTRAMUSCULAR; INTRAVENOUS; SUBCUTANEOUS at 21:48

## 2019-03-07 RX ADMIN — PANTOPRAZOLE SODIUM 10 MG/HR: 20 TABLET, DELAYED RELEASE ORAL at 23:21

## 2019-03-07 RX ADMIN — Medication 2: at 21:48

## 2019-03-07 NOTE — H&P ADULT - NSHPLABSRESULTS_GEN_ALL_CORE
CBC (03-07 @ 21:32)                          8.8<L>                   8.2     )--------------(  189        --    % Neuts, --    % Lymphs, ANC: --                              25.9<L>    BMP (03-07 @ 22:37)       142     |  112<H>  |  35<H> 			Ca++ --      Ca 8.4          ---------------------------------( 160<H>		Mg --           5.5<H>  |  19<L>   |  1.33<H>			Ph 2.4<L>      Coags (03-07 @ 22:36)  aPTT 25.4<L> / INR -- / PT --        VBG (03-07 @ 22:42)     7.32<L> / 44 / 83<H> / 22 / -3.3<L> / 95<H>%      Lactate: 0.9

## 2019-03-07 NOTE — H&P ADULT - PMH
DM2 (diabetes mellitus, type 2)    ESRD on peritoneal dialysis    HLD (hyperlipidemia)    Hypothyroidism    Marginal ulcer    Morbid obesity

## 2019-03-07 NOTE — H&P ADULT - NSHPPHYSICALEXAM_GEN_ALL_CORE
PHYSICAL EXAM:      Constitutional: Patient lying in bed comfortably.  Appears pale.    ENMT: NC/AT    Respiratory: No acute respiratory distress.  No accessory muscle use.    Cardiovascular: Regular rate, regular rhythm    Gastrointestinal:  Abdomen soft, nondistended.  Non peritoneal.  PD catheter in place.    Extremities: Moving all extremities spontaneously    Vascular: DP 2+ b/l    Neurological: A&O x3    Musculoskeletal:  Moving all four extremities without limitation

## 2019-03-07 NOTE — H&P ADULT - HISTORY OF PRESENT ILLNESS
Patient is a 74 year old male with a PMH of ESRD s/p DDRT on 2/1/19, DM, obesity (lost 600lbs), GODFREY, hypothyroidism, RNY gastric bypass in 2005 (Dr. Mckeon), GI bleed in 2016 from a marginal ulcer (according to paper documentation from [facility]) which required resection and revision (Dr. Mckeon), and a subsequent GI bleed that was temporized by endoscopy.  He presented to [facility] yesterday after having two episodes of dark tarry stools along with bright red blood with straining.  He is also having abdominal pain and one episode of nausea yesterday, but no vomiting.  At [facility], his H/H was 7.4/24 and he had received two units of PRBCs.  His vitals were stable there and he was transferred to [facility] for further care.  Here, he had one episode of a large dark black stool with no bright red blood.  He denies any nausea, vomiting, or bloating currently.

## 2019-03-07 NOTE — H&P ADULT - ASSESSMENT
Patient is a 74 year old male with a PMH of ESRD s/p DDRT on 2/1/19, RNY gastric bypass in 2005 (Dr. Mckeon), GI bleed in 2016 from a marginal ulcer which required resection and revision (Dr. Mckeon), and a subsequent GI bleed that was temporized by endoscopy who presents with a GI bleed for 2 days.    -GI consulted - Spoke with  .  Plan for upper endoscopy tomorrow.  Will continue NPO status tonight.  -IVFs - NS at 120cc/hr  -Strict Is and Os  -Protonix loaded with 80mg and protonix gtt started  -Serial CBCs and monitor hemodynamics.  -Hold Cellcept for now.  Will hold Lasix in the AM  -Levemir dose cut in half as patient is NPO.  Will continue ISS q6h  -Continue Tacrolimus home dose of 1.5mg BID.  Patient was recently decreased down to 1.5mg BID.  -Continue prednisone 5mg daily  -Bactrim and valcyte for px.  Patient was recently increased to valcyte daily from [facility]  -[facility] for DVT px.  No chemical DVT px while patient has GI bleed. Patient is a 74 year old male with a PMH of ESRD s/p DDRT on 2/1/19, RNY gastric bypass in 2005 (Dr. Mckeon), GI bleed in 2016 from a marginal ulcer which required resection and revision (Dr. Mckeon), and a subsequent GI bleed that was temporized by endoscopy who presents with a GI bleed for 2 days.    -GI consulted - Spoke with fellow Dr. Paula Yoo.  Plan for upper endoscopy tomorrow.  Will continue NPO status tonight.  -Protonix loaded with 80mg IV and protonix gtt started  -IVFs - NS at 120cc/hr  -Strict Is and Os  -Serial CBCs and monitor hemodynamics.  -Hold Cellcept for now.  Will hold Lasix in the AM  -Levemir dose cut in half as patient is NPO.  Will continue ISS q6h  -Continue Tacrolimus home dose of 1.5mg BID.  Patient was recently decreased down to 1.5mg BID.  -Continue prednisone 5mg daily  -Bactrim and valcyte for px.  Patient was recently increased to valcyte daily from [facility]  -[facility] for DVT px.  No chemical DVT px while patient has GI bleed.

## 2019-03-08 DIAGNOSIS — D89.9 DISORDER INVOLVING THE IMMUNE MECHANISM, UNSPECIFIED: ICD-10-CM

## 2019-03-08 DIAGNOSIS — E11.9 TYPE 2 DIABETES MELLITUS WITHOUT COMPLICATIONS: ICD-10-CM

## 2019-03-08 DIAGNOSIS — K92.2 GASTROINTESTINAL HEMORRHAGE, UNSPECIFIED: ICD-10-CM

## 2019-03-08 DIAGNOSIS — Z94.0 KIDNEY TRANSPLANT STATUS: ICD-10-CM

## 2019-03-08 LAB
ANION GAP SERPL CALC-SCNC: 11 MMOL/L — SIGNIFICANT CHANGE UP (ref 5–17)
ANION GAP SERPL CALC-SCNC: 26 MMOL/L — HIGH (ref 5–17)
APTT BLD: 27.2 SEC — LOW (ref 27.5–36.3)
BUN SERPL-MCNC: 30 MG/DL — HIGH (ref 7–23)
BUN SERPL-MCNC: 34 MG/DL — HIGH (ref 7–23)
CALCIUM SERPL-MCNC: 7.5 MG/DL — LOW (ref 8.4–10.5)
CALCIUM SERPL-MCNC: 8.7 MG/DL — SIGNIFICANT CHANGE UP (ref 8.4–10.5)
CHLORIDE SERPL-SCNC: 112 MMOL/L — HIGH (ref 96–108)
CHLORIDE SERPL-SCNC: 93 MMOL/L — LOW (ref 96–108)
CO2 SERPL-SCNC: 16 MMOL/L — LOW (ref 22–31)
CO2 SERPL-SCNC: 20 MMOL/L — LOW (ref 22–31)
CREAT SERPL-MCNC: 1.23 MG/DL — SIGNIFICANT CHANGE UP (ref 0.5–1.3)
CREAT SERPL-MCNC: 1.37 MG/DL — HIGH (ref 0.5–1.3)
GLUCOSE BLDC GLUCOMTR-MCNC: 126 MG/DL — HIGH (ref 70–99)
GLUCOSE BLDC GLUCOMTR-MCNC: 127 MG/DL — HIGH (ref 70–99)
GLUCOSE BLDC GLUCOMTR-MCNC: 148 MG/DL — HIGH (ref 70–99)
GLUCOSE BLDC GLUCOMTR-MCNC: 155 MG/DL — HIGH (ref 70–99)
GLUCOSE SERPL-MCNC: 142 MG/DL — HIGH (ref 70–99)
GLUCOSE SERPL-MCNC: 645 MG/DL — CRITICAL HIGH (ref 70–99)
HCT VFR BLD CALC: 21.3 % — LOW (ref 39–50)
HCT VFR BLD CALC: 23.5 % — LOW (ref 39–50)
HCT VFR BLD CALC: 24.2 % — LOW (ref 39–50)
HCT VFR BLD CALC: 25 % — LOW (ref 39–50)
HGB BLD-MCNC: 7.4 G/DL — LOW (ref 13–17)
HGB BLD-MCNC: 8.2 G/DL — LOW (ref 13–17)
HGB BLD-MCNC: 8.3 G/DL — LOW (ref 13–17)
HGB BLD-MCNC: 8.5 G/DL — LOW (ref 13–17)
INR BLD: 1.19 RATIO — HIGH (ref 0.88–1.16)
MAGNESIUM SERPL-MCNC: 2.1 MG/DL — SIGNIFICANT CHANGE UP (ref 1.6–2.6)
MAGNESIUM SERPL-MCNC: 2.2 MG/DL — SIGNIFICANT CHANGE UP (ref 1.6–2.6)
MCHC RBC-ENTMCNC: 32.2 PG — SIGNIFICANT CHANGE UP (ref 27–34)
MCHC RBC-ENTMCNC: 32.5 PG — SIGNIFICANT CHANGE UP (ref 27–34)
MCHC RBC-ENTMCNC: 33.1 PG — SIGNIFICANT CHANGE UP (ref 27–34)
MCHC RBC-ENTMCNC: 33.5 PG — SIGNIFICANT CHANGE UP (ref 27–34)
MCHC RBC-ENTMCNC: 33.8 GM/DL — SIGNIFICANT CHANGE UP (ref 32–36)
MCHC RBC-ENTMCNC: 34.2 GM/DL — SIGNIFICANT CHANGE UP (ref 32–36)
MCHC RBC-ENTMCNC: 34.5 GM/DL — SIGNIFICANT CHANGE UP (ref 32–36)
MCHC RBC-ENTMCNC: 35.1 GM/DL — SIGNIFICANT CHANGE UP (ref 32–36)
MCV RBC AUTO: 94.5 FL — SIGNIFICANT CHANGE UP (ref 80–100)
MCV RBC AUTO: 95 FL — SIGNIFICANT CHANGE UP (ref 80–100)
MCV RBC AUTO: 95.3 FL — SIGNIFICANT CHANGE UP (ref 80–100)
MCV RBC AUTO: 96.9 FL — SIGNIFICANT CHANGE UP (ref 80–100)
PHOSPHATE SERPL-MCNC: 2.9 MG/DL — SIGNIFICANT CHANGE UP (ref 2.5–4.5)
PHOSPHATE SERPL-MCNC: 30.2 MG/DL — HIGH (ref 2.5–4.5)
PLATELET # BLD AUTO: 171 K/UL — SIGNIFICANT CHANGE UP (ref 150–400)
PLATELET # BLD AUTO: 196 K/UL — SIGNIFICANT CHANGE UP (ref 150–400)
PLATELET # BLD AUTO: 200 K/UL — SIGNIFICANT CHANGE UP (ref 150–400)
PLATELET # BLD AUTO: 204 K/UL — SIGNIFICANT CHANGE UP (ref 150–400)
POTASSIUM SERPL-MCNC: 4.6 MMOL/L — SIGNIFICANT CHANGE UP (ref 3.5–5.3)
POTASSIUM SERPL-MCNC: 5.1 MMOL/L — SIGNIFICANT CHANGE UP (ref 3.5–5.3)
POTASSIUM SERPL-SCNC: 4.6 MMOL/L — SIGNIFICANT CHANGE UP (ref 3.5–5.3)
POTASSIUM SERPL-SCNC: 5.1 MMOL/L — SIGNIFICANT CHANGE UP (ref 3.5–5.3)
PROTHROM AB SERPL-ACNC: 13.6 SEC — HIGH (ref 10–12.9)
RBC # BLD: 2.2 M/UL — LOW (ref 4.2–5.8)
RBC # BLD: 2.49 M/UL — LOW (ref 4.2–5.8)
RBC # BLD: 2.55 M/UL — LOW (ref 4.2–5.8)
RBC # BLD: 2.63 M/UL — LOW (ref 4.2–5.8)
RBC # FLD: 13.9 % — SIGNIFICANT CHANGE UP (ref 10.3–14.5)
RBC # FLD: 14.5 % — SIGNIFICANT CHANGE UP (ref 10.3–14.5)
RBC # FLD: 14.6 % — HIGH (ref 10.3–14.5)
RBC # FLD: 15 % — HIGH (ref 10.3–14.5)
SODIUM SERPL-SCNC: 135 MMOL/L — SIGNIFICANT CHANGE UP (ref 135–145)
SODIUM SERPL-SCNC: 143 MMOL/L — SIGNIFICANT CHANGE UP (ref 135–145)
TACROLIMUS SERPL-MCNC: 5.5 NG/ML — SIGNIFICANT CHANGE UP
WBC # BLD: 6.2 K/UL — SIGNIFICANT CHANGE UP (ref 3.8–10.5)
WBC # BLD: 6.2 K/UL — SIGNIFICANT CHANGE UP (ref 3.8–10.5)
WBC # BLD: 6.3 K/UL — SIGNIFICANT CHANGE UP (ref 3.8–10.5)
WBC # BLD: 6.3 K/UL — SIGNIFICANT CHANGE UP (ref 3.8–10.5)
WBC # FLD AUTO: 6.2 K/UL — SIGNIFICANT CHANGE UP (ref 3.8–10.5)
WBC # FLD AUTO: 6.2 K/UL — SIGNIFICANT CHANGE UP (ref 3.8–10.5)
WBC # FLD AUTO: 6.3 K/UL — SIGNIFICANT CHANGE UP (ref 3.8–10.5)
WBC # FLD AUTO: 6.3 K/UL — SIGNIFICANT CHANGE UP (ref 3.8–10.5)

## 2019-03-08 PROCEDURE — 99222 1ST HOSP IP/OBS MODERATE 55: CPT | Mod: GC,25

## 2019-03-08 PROCEDURE — 43255 EGD CONTROL BLEEDING ANY: CPT | Mod: GC

## 2019-03-08 PROCEDURE — 99222 1ST HOSP IP/OBS MODERATE 55: CPT | Mod: GC

## 2019-03-08 RX ORDER — MORPHINE SULFATE 50 MG/1
2 CAPSULE, EXTENDED RELEASE ORAL DAILY
Qty: 0 | Refills: 0 | Status: DISCONTINUED | OUTPATIENT
Start: 2019-03-08 | End: 2019-03-08

## 2019-03-08 RX ORDER — PANTOPRAZOLE SODIUM 20 MG/1
40 TABLET, DELAYED RELEASE ORAL
Qty: 0 | Refills: 0 | Status: DISCONTINUED | OUTPATIENT
Start: 2019-03-08 | End: 2019-03-10

## 2019-03-08 RX ORDER — ACETAMINOPHEN 500 MG
1000 TABLET ORAL ONCE
Qty: 0 | Refills: 0 | Status: COMPLETED | OUTPATIENT
Start: 2019-03-08 | End: 2019-03-08

## 2019-03-08 RX ORDER — MORPHINE SULFATE 50 MG/1
2 CAPSULE, EXTENDED RELEASE ORAL ONCE
Qty: 0 | Refills: 0 | Status: DISCONTINUED | OUTPATIENT
Start: 2019-03-08 | End: 2019-03-08

## 2019-03-08 RX ADMIN — Medication 62.5 MILLIMOLE(S): at 01:04

## 2019-03-08 RX ADMIN — Medication 400 MILLIGRAM(S): at 09:32

## 2019-03-08 RX ADMIN — Medication 2: at 12:02

## 2019-03-08 RX ADMIN — Medication 12 UNIT(S): at 09:35

## 2019-03-08 RX ADMIN — MORPHINE SULFATE 2 MILLIGRAM(S): 50 CAPSULE, EXTENDED RELEASE ORAL at 20:48

## 2019-03-08 RX ADMIN — Medication 0: at 06:05

## 2019-03-08 RX ADMIN — Medication 5 MILLIGRAM(S): at 05:20

## 2019-03-08 RX ADMIN — MORPHINE SULFATE 2 MILLIGRAM(S): 50 CAPSULE, EXTENDED RELEASE ORAL at 21:03

## 2019-03-08 RX ADMIN — PANTOPRAZOLE SODIUM 40 MILLIGRAM(S): 20 TABLET, DELAYED RELEASE ORAL at 20:48

## 2019-03-08 RX ADMIN — VALGANCICLOVIR 450 MILLIGRAM(S): 450 TABLET, FILM COATED ORAL at 11:43

## 2019-03-08 RX ADMIN — Medication 1000 MILLIGRAM(S): at 10:02

## 2019-03-08 RX ADMIN — MORPHINE SULFATE 2 MILLIGRAM(S): 50 CAPSULE, EXTENDED RELEASE ORAL at 02:42

## 2019-03-08 RX ADMIN — TACROLIMUS 1.5 MILLIGRAM(S): 5 CAPSULE ORAL at 05:20

## 2019-03-08 RX ADMIN — Medication 1 TABLET(S): at 11:43

## 2019-03-08 RX ADMIN — MORPHINE SULFATE 2 MILLIGRAM(S): 50 CAPSULE, EXTENDED RELEASE ORAL at 03:12

## 2019-03-08 RX ADMIN — TACROLIMUS 1.5 MILLIGRAM(S): 5 CAPSULE ORAL at 20:22

## 2019-03-08 NOTE — CONSULT NOTE ADULT - SUBJECTIVE AND OBJECTIVE BOX
[facility] Division of Kidney Diseases & Hypertension  INITIAL CONSULT NOTE  [phone]--------------------------------------------------------------------------------    HPI: 71 yo M with ESRD s/p DDRT, hx of morbid obesity, HTN, DM2, CAD/MI, hypothyroidism, GI bleed, and nephrolithiasis presented to [facility] 2 days with complaints of dark tarry stools with bright red blood, abdominal pain, and nausea. Labs done at the time showed Hb of 7.4 and patient was transfused 2 units of PRBCs. Patient was then transferred to [facility] for further management. Patient is scheduled for upper endoscopy by GI today.     Patient had DDRT done at [facility] in 2/2019. Donor was 60 years old with KDPI of 81% and terminal Scr. of 2.8-3.0. Post transplant course was complicated by ATN requiring few sessions of PD. Last Scr. prior to admission was 1.31 on 2/27/19. Scr. was 1.2 on 3/6/19 and remains stable at 1.3 today. Patient is on tacrolimus 1.5 mg BID, MMF 1 gm BID, and prednisone 5 mg daily. Currently patient still has complains of abdominal pain but denies c/o SOB, CP, nausea, vomiting.      PAST HISTORY  --------------------------------------------------------------------------------  PAST MEDICAL & SURGICAL HISTORY:  Marginal ulcer  DM2 (diabetes mellitus, type 2)  Morbid obesity  Hypothyroidism  ESRD on peritoneal dialysis  HLD (hyperlipidemia)  History of partial gastrectomy  History of cholecystectomy  History of Porsha-en-Y gastric bypass    FAMILY HISTORY:    PAST SOCIAL HISTORY:    ALLERGIES & MEDICATIONS  --------------------------------------------------------------------------------  Allergies    Lipitor (Hepatotoxicity)    Intolerances      Standing Inpatient Medications  insulin detemir injectable (LEVEMIR) 12 Unit(s) SubCutaneous daily  insulin lispro (HumaLOG) corrective regimen sliding scale   SubCutaneous every 6 hours  pantoprazole Infusion 8 mG/Hr IV Continuous <Continuous>  predniSONE   Tablet 5 milliGRAM(s) Oral daily  sodium chloride 0.9%. 1000 milliLiter(s) IV Continuous <Continuous>  tacrolimus 1.5 milliGRAM(s) Oral two times a day  trimethoprim   80 mG/sulfamethoxazole 400 mG 1 Tablet(s) Oral daily  valGANciclovir 450 milliGRAM(s) Oral daily    PRN Inpatient Medications      REVIEW OF SYSTEMS  --------------------------------------------------------------------------------  Gen: No  fevers/chills, weakness  Skin: No rashes  Head/Eyes/Ears/Mouth: No headache; Normal hearing; Normal vision w/o blurriness;   Respiratory: No dyspnea, cough, wheezing, hemoptysis  CV: No chest pain,   GI: Abdominal pain +  : No increased frequency, dysuria, hematuria, nocturia  MSK: No joint pain/swelling;   Neuro: No dizziness/lightheadedness, weakness, seizures    All other systems were reviewed and are negative, except as noted.    VITALS/PHYSICAL EXAM  --------------------------------------------------------------------------------  T(C): 36.3 (03-08-19 @ 04:38), Max: 36.6 (03-07-19 @ 21:38)  HR: 66 (03-08-19 @ 04:38) (66 - 75)  BP: 113/68 (03-08-19 @ 04:38) (113/68 - 138/69)  RR: 18 (03-08-19 @ 04:38) (18 - 18)  SpO2: 100% (03-08-19 @ 04:38) (96% - 100%)  Wt(kg): --  Height (cm): 180.34 (03-07-19 @ 21:38)  Weight (kg): 105.7 (03-07-19 @ 21:38)  BMI (kg/m2): 32.5 (03-07-19 @ 21:38)  BSA (m2): 2.25 (03-07-19 @ 21:38)      03-07-19 @ 07:01  -  03-08-19 @ 07:00  --------------------------------------------------------  IN: 860 mL / OUT: 575 mL / NET: 285 mL    03-08-19 @ 07:01  -  03-08-19 @ 11:57  --------------------------------------------------------  IN: 0 mL / OUT: 350 mL / NET: -350 mL      Physical Exam:  	Gen: NAD  	HEENT: PERRL, supple neck  	Pulm: CTA B/L  	CV:  S1S2  	Abd: +BS, soft   	Ext: No B/L Lower ext edema  	Neuro: No focal deficits  	Skin: Warm, without rashes  	Vascular access: PD catheter present.     LABS/STUDIES  --------------------------------------------------------------------------------              8.2    6.3   >-----------<  200      [03-08-19 @ 08:55]              23.5     143  |  112  |  34  ----------------------------<  142      [03-08-19 @ 08:55]  5.1   |  20  |  1.37        Ca     8.7     [03-08-19 @ 08:55]      Mg     2.2     [03-08-19 @ 08:55]      Phos  2.9     [03-08-19 @ 08:55]      PT/INR: PT 13.6 , INR 1.19       [03-08-19 @ 04:53]  PTT: 27.2       [03-08-19 @ 04:53]      Creatinine Trend:  SCr 1.37 [03-08 @ 08:55]  SCr 1.23 [03-08 @ 04:53]  SCr 1.33 [03-07 @ 22:37]

## 2019-03-08 NOTE — PROVIDER CONTACT NOTE (OTHER) - ASSESSMENT
VSS. PCA chad blood on pt arm which had sodium phos in D5% infusing at the time. FS checked and reads 127.

## 2019-03-08 NOTE — PROGRESS NOTE ADULT - SUBJECTIVE AND OBJECTIVE BOX
Transplant Surgery - Multidisciplinary Rounds  --------------------------------------------------------------  DDRT on 2/1/19    Present:   Patient seen with multidisciplinary team including Transplant Surgeon: Dr. Laws, Dr. Napoles, . Transplant Nephrologist:  Dr. Waggoner.   Pharmacist: Jesus Barnett. Nurse Practitioner:  Elyse Foote,  RAFITA Garg, and Surgical Residents Jairo Tucker and Jennifer Fontana  in am rounds and examined with Dr. Napoles.  Disciplines not in attendance will be notified of the plan.     Patient is a 74 year old male with a PMH of ESRD s/p DDRT on 2/1/19, DM, obesity (lost 600lbs), GODFREY, hypothyroidism, RNY gastric bypass in 2005 (Dr. Mckeon), GI bleed in 2016 from a marginal ulcer (according to paper documentation from [facility]) which required resection and revision (Dr. Mckeon), and a subsequent GI bleed that was temporized by endoscopy.  He presented to [facility] yesterday after having two episodes of dark tarry stools along with bright red blood with straining.  He is also having abdominal pain and one episode of nausea yesterday, but no vomiting.  At [facility], his H/H was 7.4/24 and he had received two units of PRBCs.  His vitals were stable there and he was transferred to [facility] for further care.  Here, he had one episode of a large dark black stool with no bright red blood.  He denies any nausea, vomiting, or bloating currently.    Donor info: Donor: [facility] donor incarcerated COD: Drug overdose, anoxia. Elevated terminal creatinine in donor kidney.  Donor ID: [id number]  60y/o  CMV - /EBV + right kidney ABO: O KDPI: 81% cross clamp time: 01/31/2019 16:32hours.    Interval events: Patient seen and examined. No acute events overnight. Endorses 2 episodes melena this am and dull abd pain requiring IV morphine overnight. Denies nausea/vomiting. H/H this am 2.8/23.5, plan for endoscopy today.     Potential Discharge date: 3/12/19    Education:  Medications    Plan of care:  See Below    MEDICATIONS  (STANDING):  insulin detemir injectable (LEVEMIR) 12 Unit(s) SubCutaneous daily  insulin lispro (HumaLOG) corrective regimen sliding scale   SubCutaneous every 6 hours  pantoprazole Infusion 8 mG/Hr (10 mL/Hr) IV Continuous <Continuous>  predniSONE   Tablet 5 milliGRAM(s) Oral daily  sodium chloride 0.9%. 1000 milliLiter(s) (120 mL/Hr) IV Continuous <Continuous>  tacrolimus 1.5 milliGRAM(s) Oral two times a day  trimethoprim   80 mG/sulfamethoxazole 400 mG 1 Tablet(s) Oral daily  valGANciclovir 450 milliGRAM(s) Oral daily    MEDICATIONS  (PRN):      PAST MEDICAL & SURGICAL HISTORY:  Marginal ulcer  DM2 (diabetes mellitus, type 2)  Morbid obesity  Hypothyroidism  ESRD on peritoneal dialysis  HLD (hyperlipidemia)  History of partial gastrectomy  History of cholecystectomy  History of Porsha-en-Y gastric bypass      Vital Signs Last 24 Hrs  T(C): 36.3 (08 Mar 2019 04:38), Max: 36.6 (07 Mar 2019 21:38)  T(F): 97.4 (08 Mar 2019 04:38), Max: 97.8 (07 Mar 2019 21:38)  HR: 66 (08 Mar 2019 04:38) (66 - 75)  BP: 113/68 (08 Mar 2019 04:38) (113/68 - 138/69)  BP(mean): --  RR: 18 (08 Mar 2019 04:38) (18 - 18)  SpO2: 100% (08 Mar 2019 04:38) (96% - 100%)    I&O's Summary    07 Mar 2019 07:01  -  08 Mar 2019 07:00  --------------------------------------------------------  IN: 860 mL / OUT: 575 mL / NET: 285 mL    08 Mar 2019 07:01  -  08 Mar 2019 12:51  --------------------------------------------------------  IN: 0 mL / OUT: 600 mL / NET: -600 mL                              8.2    6.3   )-----------( 200      ( 08 Mar 2019 08:55 )             23.5     03-08    143  |  112<H>  |  34<H>  ----------------------------<  142<H>  5.1   |  20<L>  |  1.37<H>    Ca    8.7      08 Mar 2019 08:55  Phos  2.9     03-08  Mg     2.2     03-08      Tacrolimus (), Serum: 5.5 ng/mL (03-08 @ 08:47)    Review of systems  Gen: No weight changes, fatigue, fevers/chills, weakness  Skin: No rashes  Head/Eyes/Ears/Mouth: No headache; Normal hearing; Normal vision w/o blurriness; No sinus pain/discomfort, sore throat  Respiratory: No dyspnea, cough, wheezing, hemoptysis  CV: No chest pain, PND, orthopnea  GI: C/O mild abdominal pain , Pos melana, No diarrhea, constipation, nausea, vomiting,  hematochezia  : No increased frequency, dysuria, hematuria, nocturia  MSK: No joint pain/swelling; no back pain; no edema  Neuro: No dizziness/lightheadedness, weakness, seizures, numbness, tingling  Heme: No easy bruising or bleeding  Endo: No heat/cold intolerance  Psych: No significant nervousness, anxiety, stress, depression  All other systems were reviewed and are negative, except as noted.      PHYSICAL EXAM:  Constitutional: Well developed / well nourished  Eyes: Anicteric, PERRLA  ENMT: nc/at  Neck: supple  Respiratory: CTA B/L  Cardiovascular: RRR  Gastrointestinal: Soft, obese, abdomen,  Non peritoneal, non tender to touch , ND,   Genitourinary: Voiding spontaneously  Extremities: SCD's in place and working bilaterally  Vascular: Palpable dp pulses bilaterally  Neurological: A&O x3  Skin: PD cath insitu, well healed surgical incision no erythema and evidence of infection noted  Musculoskeletal: Moving all extremities  Psychiatric: Responsive

## 2019-03-08 NOTE — CONSULT NOTE ADULT - SUBJECTIVE AND OBJECTIVE BOX
Chief Complaint: Melena    HPI:    73 y/o M w/ hx of DM, morbid obesity, GODFREY, hypothyroidism, ESRD s/p DDRT on 2/1/19, s/p Porsha-en-Y gastric bypass in 2005, with upper GI bleed in 2016 from marginal ulcer s/p surgical resection and revision, followed by upper GI bleed treated with cautery (per patient), presenting with melena.    He initially presented to [facility], where he was found to have a Hgb of 7.4 and was transfused 2 units of pRBCs and subsequently transferred to [facility]. The patient endorses seeing black tarry stools starting this past Wednesday. He endorse severe epigastric pain, also starting on Wednesday, with an associated decrease in his appetite and overall PO intake. He denies bloody emesis, coffee ground emesis, and bloody stools. He endorses nausea, but denies vomiting. He denies NSAID use.     He recently underwent upper endoscopy and colonoscopy with his primary gastroenterologist, Dr. Sutherland, both of which were reportedly normal.    Allergies:  Lipitor (Hepatotoxicity)    Home Medications:    · 	NovoLOG FlexPen 100 units/mL injectable solution: 5 unit(s) injectable 3 times a day , Last Dose Taken:    · 	sulfamethoxazole-trimethoprim 400 mg-80 mg oral tablet: 1 tab(s) orally once a day, Last Dose Taken:    · 	senna oral tablet: 2 tab(s) orally once a day (at bedtime), Last Dose Taken:    · 	docusate sodium 100 mg oral capsule: 1 cap(s) orally 2 times a day, Last Dose Taken:    · 	mycophenolate mofetil 250 mg oral capsule: 1000 milligram(s) orally 2 times a day, Last Dose Taken:    · 	famotidine 20 mg oral tablet: 1 tab(s) orally once a day, Last Dose Taken:    · 	valGANciclovir 450 mg oral tablet: 1 tab(s) orally once a day, Last Dose Taken:    · 	HumaLOG KwikPen 200 units/mL (Concentrated) subcutaneous solution: sliding scale  	4 times a day (before meals and at bedtime)  	2 units for -200  	4 units for -250  	6 units for  - 300    Hospital Medications:  insulin detemir injectable (LEVEMIR) 12 Unit(s) SubCutaneous daily  insulin lispro (HumaLOG) corrective regimen sliding scale   SubCutaneous every 6 hours  pantoprazole Infusion 8 mG/Hr IV Continuous <Continuous>  predniSONE   Tablet 5 milliGRAM(s) Oral daily  sodium chloride 0.9%. 1000 milliLiter(s) IV Continuous <Continuous>  tacrolimus 1.5 milliGRAM(s) Oral two times a day  trimethoprim   80 mG/sulfamethoxazole 400 mG 1 Tablet(s) Oral daily  valGANciclovir 450 milliGRAM(s) Oral daily    PMHX/PSHX:  Marginal ulcer  DM2 (diabetes mellitus, type 2)  Morbid obesity  Hypothyroidism  ESRD on peritoneal dialysis  HLD (hyperlipidemia)  History of partial gastrectomy  History of cholecystectomy  History of Porsha-en-Y gastric bypass    Family history:     Denies family history of colon cancer/polyps, stomach cancer/polyps, pancreatic cancer/masses, liver cancer/disease, ovarian cancer and endometrial cancer.    Social History:   No history of tobacco use or illicit drug use  Occasional EtOH use    ROS:     General:  No wt loss, fevers, chills, night sweats, fatigue,   Eyes:  Good vision, no reported pain  ENT:  No sore throat, pain, runny nose, dysphagia  CV:  No pain, palpitations, hypo/hypertension  Pulm:  No dyspnea, cough, tachypnea, wheezing  GI:  + pain, + nausea, No vomiting, No diarrhea, No constipation, No weight loss, No fever, No pruritis, No rectal bleeding, + tarry stools, No dysphagia  :  No pain, bleeding, incontinence, nocturia  Skin:  No rash, tattoos, scars, edema    PHYSICAL EXAM:     GENERAL:  No acute distress, obese  HEENT:  Normocephalic/atraumatic, no scleral icterus  CHEST:  Normal effort, no accessory muscle use  HEART:  Regular rate and rhythm, no murmurs/rubs/gallops  ABDOMEN:  Soft, non-tender, non-distended, normoactive bowel sounds  RECTAL: Melena in rectal vault, no external hemorrhoids  EXTREMITIES: No cyanosis, clubbing, or edema  SKIN:  No rash/erythem  NEURO:  Alert and oriented x 3    Vital Signs:  Vital Signs Last 24 Hrs  T(C): 36.3 (08 Mar 2019 04:38), Max: 36.6 (07 Mar 2019 21:38)  T(F): 97.4 (08 Mar 2019 04:38), Max: 97.8 (07 Mar 2019 21:38)  HR: 66 (08 Mar 2019 04:38) (66 - 75)  BP: 113/68 (08 Mar 2019 04:38) (113/68 - 138/69)  BP(mean): --  RR: 18 (08 Mar 2019 04:38) (18 - 18)  SpO2: 100% (08 Mar 2019 04:38) (96% - 100%)  Daily Height in cm: 180.34 (07 Mar 2019 21:38)      LABS:                        8.2    6.3   )-----------( 200      ( 08 Mar 2019 08:55 )             23.5     Mean Cell Volume: 94.5 fl (03-08-19 @ 08:55)    03-08    143  |  112<H>  |  34<H>  ----------------------------<  142<H>  5.1   |  20<L>  |  1.37<H>    Ca    8.7      08 Mar 2019 08:55  Phos  2.9     03-08  Mg     2.2     03-08    PT/INR - ( 08 Mar 2019 04:53 )   PT: 13.6 sec;   INR: 1.19 ratio      PTT - ( 08 Mar 2019 04:53 )  PTT:27.2 sec                 8.2    6.3   )-----------( 200      ( 08 Mar 2019 08:55 )             23.5                         7.4    6.3   )-----------( 171      ( 08 Mar 2019 04:53 )             21.3                         8.8    8.2   )-----------( 189      ( 07 Mar 2019 21:32 )             25.9     Imaging:    < from: CT Abdomen and Pelvis No Cont (02.01.19 @ 06:17) >    EXAM:  CT ABDOMEN AND PELVIS                            PROCEDURE DATE:  02/01/2019            INTERPRETATION:  CLINICAL INFORMATION: Preop renal transplant.    COMPARISON: None.    PROCEDURE:   CT of the Abdomen and Pelvis was performed without intravenous contrast.   Intravenous contrast: None.  Oral contrast: None.  Sagittal and coronal reformats were performed.    FINDINGS:    LOWER CHEST: A 3 x 2 cm right pericardial lesion on series 3 image 19.   Mild fluid adjacent to the esophagus.    LIVER: Within normal limits.   BILE DUCTS: Normal caliber.  GALLBLADDER: Status post cholecystectomy.  SPLEEN: Within normal limits.   PANCREAS: Atrophic.  ADRENALS: Within normal limits.   KIDNEYS/URETERS: Atrophic native kidneys.        BLADDER: Within normal limits.   REPRODUCTIVE ORGANS: The prostate is unremarkable.    BOWEL: No bowel obstruction. The appendix is normal.  Porsha-en-Y gastric   bypass.          PERITONEUM: Peritoneal dialysis catheter in the pelvis. Trace ascites.      VESSELS:  Within normal limits.  RETROPERITONEUM: No lymphadenopathy.     ABDOMINAL WALL: Within normal limits.  BONES: Within normal limits.     IMPRESSION: Indeterminate 3 cm right pericardial lesion; recommend CT   chest with and without contrast.    DENICE CEDILLO M.D., ATTENDING RADIOLOGIST  This document has been electronically signed. Feb 1 2019 10:22AM      < end of copied text >

## 2019-03-08 NOTE — PROGRESS NOTE ADULT - PROBLEM SELECTOR PLAN 3
On Tacrolimus, pred, bactrim, and Valcyte   Hold MMF  F/U Tacrolimus level daily, tacro goal 8-10  Monitor closely.

## 2019-03-08 NOTE — CONSULT NOTE ADULT - ASSESSMENT
73 yo M with ESRD s/p DDRT, hx of morbid obesity, HTN, DM2, CAD/MI, hypothyroidism admitted for GI bleed.

## 2019-03-08 NOTE — CONSULT NOTE ADULT - PROBLEM SELECTOR RECOMMENDATION 2
Recommend to hold MMF for now. Continue with tacrolimus 1.5/1.5 and prednisone 5 mg daily. C/w bactrim and valcyte for PPx. Monitor daily FK levels.

## 2019-03-08 NOTE — CONSULT NOTE ADULT - PROBLEM SELECTOR RECOMMENDATION 9
Patient with h/o DDRT in 2/2019. Baseline Scr. appears to be between 1.2-1.3. Latest Sc.r is stable at 1.37 today. Patient is non oliguric. Continue to monitor BMP daily.

## 2019-03-08 NOTE — PROGRESS NOTE ADULT - ASSESSMENT
Patient is a 74 year old male with a PMH of ESRD s/p DDRT on 2/1/19, RNY gastric bypass in 2005 (Dr. Mckeon), GI bleed in 2016 from a marginal ulcer which required resection and revision (Dr. Mckeon), and a subsequent GI bleed that was temporized by endoscopy who presents with a GI bleed for 2 days.  GI bleed  -GI consulted - Spoke with fellow Dr. Paula Yoo.  Plan for upper endoscopy tomorrow.    -NPO   -Protonix loaded with 80mg IV and protonix gtt started  -IVFs - NS at 120cc/hr  -Strict Is and Os  -Serial CBCs and monitor hemodynamics.  -Hold Cellcept for now.    Hold lasix   --Continue Tacrolimus home dose of 1.5mg BID.  Patient was recently decreased down to 1.5mg BID.  -Continue prednisone 5mg daily  -Bactrim and valcyte for px.  Patient was recently increased to valcyte daily from [facility]  -SCDs for DVT px.  No chemical DVT px while patient has GI bleed.    Kidney replaced by transplant.    Plan:  Kidney replaced by transplant.  s/p DDRT 2/1/19    Good graft function with good urine output    strict I&Os  Daily cbc/ bmp   replete electrolytes as needed   IVF,,,,,,,,ml/hr  Pt encouraged to ambulate      Immunosuppressive management encounter following kidney transplant.    Plan: On Tacrolimus,  pred, Nystatin S&S, bactrim, and Valcyte   Hold MMF  F/U Tacrolimus level daily, tacro goal 8-10  Monitor closely.      Type 2 diabetes mellitus.  Plan: controlled  c/w levemir continue  1/2 home dose while NPO  Continue sliding scale coverage, and monitor BG  NPO for now Patient is a 74 year old male with a PMH of ESRD s/p DDRT on 2/1/19, RNY gastric bypass in 2005 (Dr. Mckeon), GI bleed in 2016 from a marginal ulcer which required resection and revision (Dr. Mckeon), and a subsequent GI bleed that was temporized by endoscopy who presents with a GI bleed for 2 days.

## 2019-03-08 NOTE — CONSULT NOTE ADULT - ASSESSMENT
Impression:    1. Acute blood loss anemia with melena: Primary concern for PUD given patient's history, however, differential includes esophagitis, erosive gastropathy, and small bowel angiectasias. May represent right sided colonic source, however, this is less likely given recent colonoscopy. Currently with overt melena, HD stable, and with Hgb drop from 8.8 to 8.2 after receiving 2 units pRBCs at [facility] for Hgb of 7.4  2. ESRD s/p DDRT on 2/1/19: On Mycophenolate mofetil, tacrolimus, and prednisone at home. Currently on tacrolimus and prednisone.  3. Morbid obesity  4. DM  5. GODFREY  6. Hypothyroidism    Recommendations:  - Continue NPO  - Plan for upper endoscopy today  - Monitor CBCs q8h  - Monitor bowel movements  - Transfuse for goal Hgb >/= 7.0  - Continue pantoprazole infusion at 8 mg/hr  - Two large bore IVs  - Maintain active type and screen  - Rest of care per primary team    Rajendra Parsons MD  Gastroenterology Fellow  Pager number: [phone] / [phone]

## 2019-03-08 NOTE — PROGRESS NOTE ADULT - SUBJECTIVE AND OBJECTIVE BOX
Pre-Endoscopy Evaluation      Referring Physician:  gene noland                                Procedure:  upper gastrointestinal endoscopy     Indication for Procedure: gib    Pertinent History: 72y male with PMH of with ESRD s/p DDRT, 2/1/19,  hx of morbid obesity,  s/p Porsha-en-Y gastric bypass in 2005, HTN, DM2, CAD/MI, hypothyroidism admitted for GI bleed.       Sedation by Anesthesia [X]    PAST MEDICAL & SURGICAL HISTORY:  Marginal ulcer  DM2 (diabetes mellitus, type 2)  Morbid obesity  Hypothyroidism  ESRD on peritoneal dialysis  HLD (hyperlipidemia)  History of partial gastrectomy  History of cholecystectomy  History of Porsha-en-Y gastric bypass      PMH of Gastroparesis [ ]  Gastric Surgery [x]  Gastric Outlet Obstruction [ ]    Allergies    Lipitor (Hepatotoxicity)    Intolerances      Latex allergy: [ ] yes [X] no    Medications:MEDICATIONS  (STANDING):  insulin detemir injectable (LEVEMIR) 12 Unit(s) SubCutaneous daily  insulin lispro (HumaLOG) corrective regimen sliding scale   SubCutaneous every 6 hours  pantoprazole Infusion 8 mG/Hr (10 mL/Hr) IV Continuous <Continuous>  predniSONE   Tablet 5 milliGRAM(s) Oral daily  sodium chloride 0.9%. 1000 milliLiter(s) (120 mL/Hr) IV Continuous <Continuous>  tacrolimus 1.5 milliGRAM(s) Oral two times a day  trimethoprim   80 mG/sulfamethoxazole 400 mG 1 Tablet(s) Oral daily  valGANciclovir 450 milliGRAM(s) Oral daily    MEDICATIONS  (PRN):      Smoking: [ ] yes  [X] no    AICD/PPM: [ ] yes   [X] no    Pertinent lab data:                        8.5    6.2   )-----------( 204      ( 08 Mar 2019 15:15 )             25.0     03-08    143  |  112<H>  |  34<H>  ----------------------------<  142<H>  5.1   |  20<L>  |  1.37<H>    Ca    8.7      08 Mar 2019 08:55  Phos  2.9     03-08  Mg     2.2     03-08      PT/INR - ( 08 Mar 2019 04:53 )   PT: 13.6 sec;   INR: 1.19 ratio         PTT - ( 08 Mar 2019 04:53 )  PTT:27.2 sec      < from: TTE Echo Complete w/Doppler (06.21.17 @ 07:38) >         PHYSICIAN INTERPRETATION:  Left Ventricle: The left ventricular internal cavity size is normal.   There is mild concentric left ventricular hypertrophy.  Global LV systolic function was normal. Left ventricular ejection   fraction, by visual estimation, is 60 to 65%. Spectral Doppler shows   impaired relaxation pattern of left ventricular myocardial filling (Grade   I diastolic dysfunction).  Right Ventricle: The right ventricular size is normal. RV systolic   function is normal.  Left Atrium: Normal left atrial size.  Right Atrium: The right atrium is normal in size.  Pericardium: There is no evidence of pericardial effusion.  Mitral Valve: The mitral valve is normal in structure. Trace mitral valve   regurgitation is seen.  Tricuspid Valve: The tricuspid valve is normal in structure. Trivial   tricuspid regurgitation is visualized.  Aortic Valve: The aortic valve is trileaflet. Sclerotic aortic valve with   normal opening. No evidence of aortic valve regurgitation is seen.  Pulmonic Valve: Structurally normal pulmonic valve, with normal leaflet   excursion. Trace pulmonic valve regurgitation.  Aorta: The aortic root is normal in size and structure.  Pulmonary Artery: The main pulmonary artery is normal in size.  Venous: The inferior vena cava was normal sized, with respiratory size   variation greater than 50%.        Summary:   1. Left ventricular ejection fraction, by visual estimation, is 60 to   65%.   2. Normal global left ventricular systolic function.   3. Spectral Doppler shows impaired relaxation pattern of left   ventricular myocardial filling (Grade I diastolic dysfunction).   4. There is mild concentric left ventricular hypertrophy.   5. Sclerotic aortic valve with normal opening.  -----------------------------------------------------------------------------      CAPILLARY BLOOD GLUCOSE  POCT Blood Glucose.: 155 mg/dL (08 Mar 2019 11:57)      Physical Examination:  Daily Height in cm: 180.34 (07 Mar 2019 21:38)    Daily   Vital Signs Last 24 Hrs  T(C): 36.5 (08 Mar 2019 13:49), Max: 36.6 (07 Mar 2019 21:38)  T(F): 97.7 (08 Mar 2019 13:49), Max: 97.8 (07 Mar 2019 21:38)  HR: 62 (08 Mar 2019 13:49) (62 - 75)  BP: 113/68 (08 Mar 2019 04:38) (113/68 - 138/69)  BP(mean): --  RR: 18 (08 Mar 2019 13:49) (18 - 18)  SpO2: 96% (08 Mar 2019 13:49) (96% - 100%)    Drug Dosing Weight  Height (cm): 180.34 (07 Mar 2019 21:38)  Weight (kg): 105.7 (07 Mar 2019 21:38)  BMI (kg/m2): 32.5 (07 Mar 2019 21:38)  BSA (m2): 2.25 (07 Mar 2019 21:38)    Constitutional: NAD     Neck:  No JVD    Respiratory: CTAB/L    Cardiovascular: S1 and S2    Gastrointestinal: BS+, soft, NT/ND    Extremities: No peripheral edema    Neurological: A/O x 3    : No Nguyen    Skin: No rashes    Comments:    ASA Class: I [ ]  II [ ]  III [X]  IV [ ]    The patient is a suitable candidate for the planned procedure unless box checked [ ]  No, explain:

## 2019-03-09 LAB
GLUCOSE BLDC GLUCOMTR-MCNC: 114 MG/DL — HIGH (ref 70–99)
GLUCOSE BLDC GLUCOMTR-MCNC: 114 MG/DL — HIGH (ref 70–99)
GLUCOSE BLDC GLUCOMTR-MCNC: 127 MG/DL — HIGH (ref 70–99)
GLUCOSE BLDC GLUCOMTR-MCNC: 146 MG/DL — HIGH (ref 70–99)
GLUCOSE BLDC GLUCOMTR-MCNC: 207 MG/DL — HIGH (ref 70–99)
HCT VFR BLD CALC: 22.1 % — LOW (ref 39–50)
HGB BLD-MCNC: 7.8 G/DL — LOW (ref 13–17)
INR BLD: 1.06 RATIO — SIGNIFICANT CHANGE UP (ref 0.88–1.16)
MAGNESIUM SERPL-MCNC: 1.9 MG/DL — SIGNIFICANT CHANGE UP (ref 1.6–2.6)
MCHC RBC-ENTMCNC: 33.8 PG — SIGNIFICANT CHANGE UP (ref 27–34)
MCHC RBC-ENTMCNC: 35.2 GM/DL — SIGNIFICANT CHANGE UP (ref 32–36)
MCV RBC AUTO: 96.3 FL — SIGNIFICANT CHANGE UP (ref 80–100)
PHOSPHATE SERPL-MCNC: 2.4 MG/DL — LOW (ref 2.5–4.5)
PLATELET # BLD AUTO: 177 K/UL — SIGNIFICANT CHANGE UP (ref 150–400)
PROTHROM AB SERPL-ACNC: 12.2 SEC — SIGNIFICANT CHANGE UP (ref 10–12.9)
RBC # BLD: 2.3 M/UL — LOW (ref 4.2–5.8)
RBC # FLD: 14.2 % — SIGNIFICANT CHANGE UP (ref 10.3–14.5)
TACROLIMUS SERPL-MCNC: 5.9 NG/ML — SIGNIFICANT CHANGE UP
WBC # BLD: 6 K/UL — SIGNIFICANT CHANGE UP (ref 3.8–10.5)
WBC # FLD AUTO: 6 K/UL — SIGNIFICANT CHANGE UP (ref 3.8–10.5)

## 2019-03-09 PROCEDURE — 99232 SBSQ HOSP IP/OBS MODERATE 35: CPT | Mod: GC

## 2019-03-09 RX ORDER — SODIUM CHLORIDE 9 MG/ML
1000 INJECTION, SOLUTION INTRAVENOUS
Qty: 0 | Refills: 0 | Status: DISCONTINUED | OUTPATIENT
Start: 2019-03-09 | End: 2019-03-10

## 2019-03-09 RX ORDER — INSULIN DETEMIR 100/ML (3)
25 INSULIN PEN (ML) SUBCUTANEOUS DAILY
Qty: 0 | Refills: 0 | Status: DISCONTINUED | OUTPATIENT
Start: 2019-03-09 | End: 2019-03-10

## 2019-03-09 RX ORDER — IRON SUCROSE 20 MG/ML
100 INJECTION, SOLUTION INTRAVENOUS ONCE
Qty: 0 | Refills: 0 | Status: COMPLETED | OUTPATIENT
Start: 2019-03-09 | End: 2019-03-09

## 2019-03-09 RX ORDER — INSULIN LISPRO 100/ML
5 VIAL (ML) SUBCUTANEOUS
Qty: 0 | Refills: 0 | Status: DISCONTINUED | OUTPATIENT
Start: 2019-03-09 | End: 2019-03-10

## 2019-03-09 RX ORDER — SODIUM CHLORIDE 9 MG/ML
1000 INJECTION, SOLUTION INTRAVENOUS
Qty: 0 | Refills: 0 | Status: DISCONTINUED | OUTPATIENT
Start: 2019-03-09 | End: 2019-03-09

## 2019-03-09 RX ADMIN — PANTOPRAZOLE SODIUM 40 MILLIGRAM(S): 20 TABLET, DELAYED RELEASE ORAL at 17:36

## 2019-03-09 RX ADMIN — Medication 4: at 12:32

## 2019-03-09 RX ADMIN — TACROLIMUS 1.5 MILLIGRAM(S): 5 CAPSULE ORAL at 17:35

## 2019-03-09 RX ADMIN — IRON SUCROSE 210 MILLIGRAM(S): 20 INJECTION, SOLUTION INTRAVENOUS at 11:31

## 2019-03-09 RX ADMIN — Medication 5 MILLIGRAM(S): at 06:18

## 2019-03-09 RX ADMIN — TACROLIMUS 1.5 MILLIGRAM(S): 5 CAPSULE ORAL at 06:17

## 2019-03-09 RX ADMIN — VALGANCICLOVIR 450 MILLIGRAM(S): 450 TABLET, FILM COATED ORAL at 12:53

## 2019-03-09 RX ADMIN — Medication 5 UNIT(S): at 17:04

## 2019-03-09 RX ADMIN — Medication 12 UNIT(S): at 07:54

## 2019-03-09 RX ADMIN — Medication 1 TABLET(S): at 12:53

## 2019-03-09 RX ADMIN — PANTOPRAZOLE SODIUM 40 MILLIGRAM(S): 20 TABLET, DELAYED RELEASE ORAL at 06:17

## 2019-03-09 NOTE — PROGRESS NOTE ADULT - SUBJECTIVE AND OBJECTIVE BOX
Chief Complaint:  Patient is a 74y old  Male who presents with a chief complaint of Gastrointestinal Bleeding (09 Mar 2019 10:17)      Interval Events: Pt endorses 3 episode of melena yesterday, Hgb is 7.8 today (from 8.3). Pt otherwise denies dizziness, abd pain. Pt was started on CLD.   ROS: All 12 point system except listed above were otherwise negative.    Allergies:  Lipitor (Hepatotoxicity)        Hospital Medications:  dextrose 5% + sodium chloride 0.9%. 1000 milliLiter(s) IV Continuous <Continuous>  insulin detemir injectable (LEVEMIR) 12 Unit(s) SubCutaneous daily  insulin lispro (HumaLOG) corrective regimen sliding scale   SubCutaneous every 6 hours  iron sucrose IVPB 100 milliGRAM(s) IV Intermittent once  pantoprazole    Tablet 40 milliGRAM(s) Oral two times a day  predniSONE   Tablet 5 milliGRAM(s) Oral daily  tacrolimus 1.5 milliGRAM(s) Oral two times a day  trimethoprim   80 mG/sulfamethoxazole 400 mG 1 Tablet(s) Oral daily  valGANciclovir 450 milliGRAM(s) Oral daily      PMHX/PSHX:  Marginal ulcer  DM2 (diabetes mellitus, type 2)  Morbid obesity  Hypothyroidism  ESRD on peritoneal dialysis  HLD (hyperlipidemia)  History of partial gastrectomy  History of cholecystectomy  History of Porsha-en-Y gastric bypass      Family history:  No pertinent family history in first degree relatives    There is no family history of peptic ulcer disease, gastric cancer, colon polyps, colon cancer, celiac disease, biliary, hepatic, or pancreatic disease.  None of the female relatives have breast, uterine, or ovarian cancer.     PHYSICAL EXAM:   Vital Signs:  Vital Signs Last 24 Hrs  T(C): 36.7 (09 Mar 2019 05:08), Max: 36.7 (09 Mar 2019 05:08)  T(F): 98 (09 Mar 2019 05:08), Max: 98 (09 Mar 2019 05:08)  HR: 72 (09 Mar 2019 07:13) (62 - 72)  BP: 135/72 (09 Mar 2019 07:13) (101/62 - 155/84)  BP(mean): --  RR: 18 (09 Mar 2019 05:08) (18 - 18)  SpO2: 96% (09 Mar 2019 05:08) (96% - 97%)  Daily     Daily     PHYSICAL EXAM:     GENERAL:  No acute distress, obese  HEENT:  Normocephalic/atraumatic, no scleral icterus  ABDOMEN:  Soft, non-tender, non-distended, normoactive bowel sounds  EXTREMITIES: No cyanosis, clubbing, or edema  SKIN:  No rash/erythema      LABS:                        7.8    6.0   )-----------( 177      ( 09 Mar 2019 06:35 )             22.1     Mean Cell Volume: 96.3 fl (03-09-19 @ 06:35)    03-08    143  |  112<H>  |  34<H>  ----------------------------<  142<H>  5.1   |  20<L>  |  1.37<H>    Ca    8.7      08 Mar 2019 08:55  Phos  2.4     03-09  Mg     1.9     03-09        PT/INR - ( 09 Mar 2019 06:35 )   PT: 12.2 sec;   INR: 1.06 ratio         PTT - ( 08 Mar 2019 04:53 )  PTT:27.2 sec                            7.8    6.0   )-----------( 177      ( 09 Mar 2019 06:35 )             22.1                         8.3    6.2   )-----------( 196      ( 08 Mar 2019 22:28 )             24.2                         8.5    6.2   )-----------( 204      ( 08 Mar 2019 15:15 )             25.0                         8.2    6.3   )-----------( 200      ( 08 Mar 2019 08:55 )             23.5                         7.4    6.3   )-----------( 171      ( 08 Mar 2019 04:53 )             21.3     Imaging:    < from: Upper Endoscopy (03.08.19 @ 17:05) >  Findings:       A small hiatus hernia was present.       The exam of the esophagus was otherwise normal.       Evidence of a patent Billroth II gastrojejunostomy was found. The gastrojejunal anastomosis        was characterized by edematous, friable mucosa and two surgical metal clips.This was        traversed. The efferent limb was examined. The afferent limb was examined. The blind end was        reached and appeared normal.       One non-bleeding cratered gastric ulcer with no stigmata of bleeding was found at the        anastomosis. The lesion was 10 mm in largest dimension. To help heal the painful ulcer and        prevent rebleeding, three hemostatic clips were successfully placed. There was no bleeding at        the end of the procedure.       The exam of the stomach was otherwise normal.       The examined duodenum was normal.                                                                                                        Impression:          - Small hiatus hernia.                       - Patent Billroth II gastrojejunostomy was found, characterized by                        erythematous friable mucosa and ends of two surgical clips.                       - Non-bleeding gastric ulcer with no stigmata of bleeding. Clips were placed.                       -Normal examined duodenum.                       - No specimens collected.  Recommendation:      - Return patient to hospital cooley for ongoing care.                       - Advance diet as tolerated.                       - PPI PO daily.          - Monitor H/H.                       - Transfuse as needed.                       - Outpatietn GI follow up.    < end of copied text >

## 2019-03-09 NOTE — PROGRESS NOTE ADULT - SUBJECTIVE AND OBJECTIVE BOX
Transplant Surgery - Multidisciplinary Rounds  --------------------------------------------------------------  DDRT on 2/1/19, admitted on 3/7/2019 R/O GIB    Present:   Patient seen with multidisciplinary team including Transplant Surgeon: Dr. Laws, RUTH Garnett in am rounds and examined with Dr. Harrell.  Disciplines not in attendance will be notified of the plan.     Patient is a 74 year old male with a PMH of ESRD s/p DDRT on 2/1/19, DM, obesity (lost 600lbs), GODFREY, hypothyroidism, RNY gastric bypass in 2005 (Dr. Mckeon), GI bleed in 2016 from a marginal ulcer (according to paper documentation from [facility]) which required resection and revision (Dr. Mckeon), and a subsequent GI bleed that was temporized by endoscopy.  He presented to [facility] yesterday after having two episodes of dark tarry stools along with bright red blood with straining.  He is also having abdominal pain and one episode of nausea yesterday, but no vomiting.  At [facility], his H/H was 7.4/24 and he had received two units of PRBCs.  His vitals were stable there and he was transferred to [facility] for further care.  Here, he had one episode of a large dark black stool with no bright red blood.  He denies any nausea, vomiting, or bloating currently.    Donor info: Donor: [facility] donor incarcerated COD: Drug overdose, anoxia. Elevated terminal creatinine in donor kidney.  Donor ID: [id number]  60y/o  CMV - /EBV + right kidney ABO: O KDPI: 81% cross clamp time: 01/31/2019 16:32hours.    Interval events: Patient seen and examined. No acute events overnight. Endorses 1 episode of melena overnight.  Denies nausea/vomiting. H/H this am 7.8/22.1, S/P endoscopy yesterday non bleeding gastric ulcer clipped and started on PPI PO BID.     Potential Discharge date: 3/10/19    Education:  Medications    Plan of care:  See Below    MEDICATIONS  (STANDING):  insulin detemir injectable (LEVEMIR) 12 Unit(s) SubCutaneous daily  insulin lispro (HumaLOG) corrective regimen sliding scale   SubCutaneous every 6 hours  pantoprazole Infusion 8 mG/Hr (10 mL/Hr) IV Continuous <Continuous>  predniSONE   Tablet 5 milliGRAM(s) Oral daily  sodium chloride 0.9%. 1000 milliLiter(s) (120 mL/Hr) IV Continuous <Continuous>  tacrolimus 1.5 milliGRAM(s) Oral two times a day  trimethoprim   80 mG/sulfamethoxazole 400 mG 1 Tablet(s) Oral daily  valGANciclovir 450 milliGRAM(s) Oral daily    MEDICATIONS  (PRN):      PAST MEDICAL & SURGICAL HISTORY:  Marginal ulcer  DM2 (diabetes mellitus, type 2)  Morbid obesity  Hypothyroidism  ESRD on peritoneal dialysis  HLD (hyperlipidemia)  History of partial gastrectomy  History of cholecystectomy  History of Porsha-en-Y gastric bypass      Vital Signs Last 24 Hrs  T(C): 36.3 (08 Mar 2019 04:38), Max: 36.6 (07 Mar 2019 21:38)  T(F): 97.4 (08 Mar 2019 04:38), Max: 97.8 (07 Mar 2019 21:38)  HR: 66 (08 Mar 2019 04:38) (66 - 75)  BP: 113/68 (08 Mar 2019 04:38) (113/68 - 138/69)  BP(mean): --  RR: 18 (08 Mar 2019 04:38) (18 - 18)  SpO2: 100% (08 Mar 2019 04:38) (96% - 100%)    I&O's Summary    07 Mar 2019 07:01  -  08 Mar 2019 07:00  --------------------------------------------------------  IN: 860 mL / OUT: 575 mL / NET: 285 mL    08 Mar 2019 07:01  -  08 Mar 2019 12:51  --------------------------------------------------------  IN: 0 mL / OUT: 600 mL / NET: -600 mL                              8.2    6.3   )-----------( 200      ( 08 Mar 2019 08:55 )             23.5     03-08    143  |  112<H>  |  34<H>  ----------------------------<  142<H>  5.1   |  20<L>  |  1.37<H>    Ca    8.7      08 Mar 2019 08:55  Phos  2.9     03-08  Mg     2.2     03-08      Tacrolimus (), Serum: 5.5 ng/mL (03-08 @ 08:47)    Review of systems  Gen: No weight changes, fatigue, fevers/chills, weakness  Skin: No rashes  Head/Eyes/Ears/Mouth: No headache; Normal hearing; Normal vision w/o blurriness; No sinus pain/discomfort, sore throat  Respiratory: No dyspnea, cough, wheezing, hemoptysis  CV: No chest pain, PND, orthopnea  GI: C/O mild abdominal pain , Pos melana, No diarrhea, constipation, nausea, vomiting,  hematochezia  : No increased frequency, dysuria, hematuria, nocturia  MSK: No joint pain/swelling; no back pain; no edema  Neuro: No dizziness/lightheadedness, weakness, seizures, numbness, tingling  Heme: No easy bruising or bleeding  Endo: No heat/cold intolerance  Psych: No significant nervousness, anxiety, stress, depression  All other systems were reviewed and are negative, except as noted.      PHYSICAL EXAM:  Constitutional: Well developed / well nourished  Eyes: Anicteric, PERRLA  ENMT: nc/at  Neck: supple  Respiratory: CTA B/L  Cardiovascular: RRR  Gastrointestinal: Soft, obese, abdomen,  Non peritoneal, non tender to touch , ND,   Genitourinary: Voiding spontaneously  Extremities: SCD's in place and working bilaterally  Vascular: Palpable dp pulses bilaterally  Neurological: A&O x3  Skin: PD cath insitu, well healed surgical incision no erythema and evidence of infection noted  Musculoskeletal: Moving all extremities  Psychiatric: Responsive Transplant Surgery - Multidisciplinary Rounds  --------------------------------------------------------------  DDRT on 2/1/19, admitted on 3/7/2019 R/O GIB    Present:   Patient seen with multidisciplinary team including Transplant Surgeon: Dr. Laws, RUTH Garnett in am rounds and examined with Dr. Harrell.  Disciplines not in attendance will be notified of the plan.     Patient is a 74 year old male with a PMH of ESRD s/p DDRT on 2/1/19, DM, obesity (lost 600lbs), GODFREY, hypothyroidism, RNY gastric bypass in 2005 (Dr. Mckeon), GI bleed in 2016 from a marginal ulcer (according to paper documentation from [facility]) which required resection and revision (Dr. Mckeon), and a subsequent GI bleed that was temporized by endoscopy.  He presented to [facility] yesterday after having two episodes of dark tarry stools along with bright red blood with straining.  He is also having abdominal pain and one episode of nausea yesterday, but no vomiting.  At [facility], his H/H was 7.4/24 and he had received two units of PRBCs.  His vitals were stable there and he was transferred to [facility] for further care.  Here, he had one episode of a large dark black stool with no bright red blood.  He denies any nausea, vomiting, or bloating currently.    Donor info: Donor: [facility] donor incarcerated COD: Drug overdose, anoxia. Elevated terminal creatinine in donor kidney.  Donor ID: [id number]  60y/o  CMV - /EBV + right kidney ABO: O KDPI: 81% cross clamp time: 01/31/2019 16:32hours.    Interval events: Patient seen and examined. No acute events overnight. Endorses 1 episode of melena overnight.  Denies nausea/vomiting. H/H this am 7.8/22.1, S/P endoscopy yesterday non bleeding gastric ulcer clipped and started on PPI PO BID.     Potential Discharge date: 3/10/19    Education:  Medications    Plan of care:  See Below       MEDICATIONS  (STANDING):  dextrose 5% + sodium chloride 0.9%. 1000 milliLiter(s) (120 mL/Hr) IV Continuous <Continuous>  insulin detemir injectable (LEVEMIR) 12 Unit(s) SubCutaneous daily  insulin lispro (HumaLOG) corrective regimen sliding scale   SubCutaneous every 6 hours  iron sucrose IVPB 100 milliGRAM(s) IV Intermittent once  pantoprazole    Tablet 40 milliGRAM(s) Oral two times a day  predniSONE   Tablet 5 milliGRAM(s) Oral daily  tacrolimus 1.5 milliGRAM(s) Oral two times a day  trimethoprim   80 mG/sulfamethoxazole 400 mG 1 Tablet(s) Oral daily  valGANciclovir 450 milliGRAM(s) Oral daily    MEDICATIONS  (PRN):      PAST MEDICAL & SURGICAL HISTORY:  Marginal ulcer  DM2 (diabetes mellitus, type 2)  Morbid obesity  Hypothyroidism  ESRD on peritoneal dialysis  HLD (hyperlipidemia)  History of partial gastrectomy  History of cholecystectomy  History of Porsha-en-Y gastric bypass      Vital Signs Last 24 Hrs  T(C): 36.7 (09 Mar 2019 05:08), Max: 36.7 (09 Mar 2019 05:08)  T(F): 98 (09 Mar 2019 05:08), Max: 98 (09 Mar 2019 05:08)  HR: 72 (09 Mar 2019 07:13) (62 - 72)  BP: 135/72 (09 Mar 2019 07:13) (101/62 - 155/84)  BP(mean): --  RR: 18 (09 Mar 2019 05:08) (18 - 18)  SpO2: 96% (09 Mar 2019 05:08) (96% - 97%)    I&O's Summary    08 Mar 2019 07:01  -  09 Mar 2019 07:00  --------------------------------------------------------  IN: 2710 mL / OUT: 1650 mL / NET: 1060 mL                              7.8    6.0   )-----------( 177      ( 09 Mar 2019 06:35 )             22.1     03-08    143  |  112<H>  |  34<H>  ----------------------------<  142<H>  5.1   |  20<L>  |  1.37<H>    Ca    8.7      08 Mar 2019 08:55  Phos  2.4     03-09  Mg     1.9     03-09      Tacrolimus (), Serum: 5.5 ng/mL (03-08 @ 08:47)       Review of systems  Gen: No weight changes, fatigue, fevers/chills, weakness  Skin: No rashes  Head/Eyes/Ears/Mouth: No headache; Normal hearing; Normal vision w/o blurriness; No sinus pain/discomfort, sore throat  Respiratory: No dyspnea, cough, wheezing, hemoptysis  CV: No chest pain, PND, orthopnea  GI: C/O mild abdominal pain , Pos melana, No diarrhea, constipation, nausea, vomiting,  hematochezia  : No increased frequency, dysuria, hematuria, nocturia  MSK: No joint pain/swelling; no back pain; no edema  Neuro: No dizziness/lightheadedness, weakness, seizures, numbness, tingling  Heme: No easy bruising or bleeding  Endo: No heat/cold intolerance  Psych: No significant nervousness, anxiety, stress, depression  All other systems were reviewed and are negative, except as noted.      PHYSICAL EXAM:  Constitutional: Well developed / well nourished  Eyes: Anicteric, PERRLA  ENMT: nc/at  Neck: supple  Respiratory: CTA B/L  Cardiovascular: RRR  Gastrointestinal: Soft, obese, abdomen,  Non peritoneal, non tender to touch , ND,   Genitourinary: Voiding spontaneously  Extremities: SCD's in place and working bilaterally  Vascular: Palpable dp pulses bilaterally  Neurological: A&O x3  Skin: PD cath insitu, well healed surgical incision no erythema and evidence of infection noted  Musculoskeletal: Moving all extremities  Psychiatric: Responsive

## 2019-03-09 NOTE — PROGRESS NOTE ADULT - ASSESSMENT
Patient is a 74 year old male with a PMH of ESRD s/p DDRT on 2/1/19, RNY gastric bypass in 2005 (Dr. Mckeon), GI bleed in 2016 from a marginal ulcer which required resection and revision (Dr. Mckeon), and a subsequent GI bleed that was temporized by endoscopy who presents with a GI bleed for 2 days. S/P EGD with clipping x1 non bleeding gastric ulcer.

## 2019-03-09 NOTE — PROGRESS NOTE ADULT - ASSESSMENT
Impression:  1) Melena with acute blood loss anemia- S/P EGD revealing one nonbleeding cratered gastric ulcer s/p 3 clips placed, edematous and friablez mucosa at GJ anastomosis  2) ESRD s/p DDRT on 2/1/19: On Mycophenolate mofetil, tacrolimus, and prednisone at home. Currently on tacrolimus and prednisone.  3) Morbid obesity  4) DM  5) GODFREY  6) Hypothyroidism    Recommendations:  -Continue PPI PO daily  -Monitor H/H and BMs  -Clear liquid diet today

## 2019-03-10 ENCOUNTER — TRANSCRIPTION ENCOUNTER (OUTPATIENT)
Age: 74
End: 2019-03-10

## 2019-03-10 VITALS
OXYGEN SATURATION: 99 % | SYSTOLIC BLOOD PRESSURE: 134 MMHG | HEART RATE: 78 BPM | RESPIRATION RATE: 18 BRPM | TEMPERATURE: 99 F | DIASTOLIC BLOOD PRESSURE: 78 MMHG

## 2019-03-10 LAB
ANION GAP SERPL CALC-SCNC: 9 MMOL/L — SIGNIFICANT CHANGE UP (ref 5–17)
APTT BLD: 28.5 SEC — SIGNIFICANT CHANGE UP (ref 27.5–36.3)
BUN SERPL-MCNC: 19 MG/DL — SIGNIFICANT CHANGE UP (ref 7–23)
CALCIUM SERPL-MCNC: 8.3 MG/DL — LOW (ref 8.4–10.5)
CHLORIDE SERPL-SCNC: 111 MMOL/L — HIGH (ref 96–108)
CO2 SERPL-SCNC: 18 MMOL/L — LOW (ref 22–31)
CREAT SERPL-MCNC: 1.17 MG/DL — SIGNIFICANT CHANGE UP (ref 0.5–1.3)
GLUCOSE BLDC GLUCOMTR-MCNC: 109 MG/DL — HIGH (ref 70–99)
GLUCOSE BLDC GLUCOMTR-MCNC: 172 MG/DL — HIGH (ref 70–99)
GLUCOSE SERPL-MCNC: 142 MG/DL — HIGH (ref 70–99)
HCT VFR BLD CALC: 21.3 % — LOW (ref 39–50)
HGB BLD-MCNC: 7.5 G/DL — LOW (ref 13–17)
MAGNESIUM SERPL-MCNC: 1.7 MG/DL — SIGNIFICANT CHANGE UP (ref 1.6–2.6)
MCHC RBC-ENTMCNC: 33.7 PG — SIGNIFICANT CHANGE UP (ref 27–34)
MCHC RBC-ENTMCNC: 35.3 GM/DL — SIGNIFICANT CHANGE UP (ref 32–36)
MCV RBC AUTO: 95.6 FL — SIGNIFICANT CHANGE UP (ref 80–100)
PHOSPHATE SERPL-MCNC: 2 MG/DL — LOW (ref 2.5–4.5)
PLATELET # BLD AUTO: 164 K/UL — SIGNIFICANT CHANGE UP (ref 150–400)
POTASSIUM SERPL-MCNC: 4.7 MMOL/L — SIGNIFICANT CHANGE UP (ref 3.5–5.3)
POTASSIUM SERPL-SCNC: 4.7 MMOL/L — SIGNIFICANT CHANGE UP (ref 3.5–5.3)
RBC # BLD: 2.23 M/UL — LOW (ref 4.2–5.8)
RBC # FLD: 13.8 % — SIGNIFICANT CHANGE UP (ref 10.3–14.5)
SODIUM SERPL-SCNC: 138 MMOL/L — SIGNIFICANT CHANGE UP (ref 135–145)
TACROLIMUS SERPL-MCNC: 5 NG/ML — SIGNIFICANT CHANGE UP
WBC # BLD: 5.1 K/UL — SIGNIFICANT CHANGE UP (ref 3.8–10.5)
WBC # FLD AUTO: 5.1 K/UL — SIGNIFICANT CHANGE UP (ref 3.8–10.5)

## 2019-03-10 PROCEDURE — C1889: CPT

## 2019-03-10 PROCEDURE — 86901 BLOOD TYPING SEROLOGIC RH(D): CPT

## 2019-03-10 PROCEDURE — 82435 ASSAY OF BLOOD CHLORIDE: CPT

## 2019-03-10 PROCEDURE — 82962 GLUCOSE BLOOD TEST: CPT

## 2019-03-10 PROCEDURE — 85027 COMPLETE CBC AUTOMATED: CPT

## 2019-03-10 PROCEDURE — 85730 THROMBOPLASTIN TIME PARTIAL: CPT

## 2019-03-10 PROCEDURE — 71045 X-RAY EXAM CHEST 1 VIEW: CPT

## 2019-03-10 PROCEDURE — 82565 ASSAY OF CREATININE: CPT

## 2019-03-10 PROCEDURE — 85014 HEMATOCRIT: CPT

## 2019-03-10 PROCEDURE — 85610 PROTHROMBIN TIME: CPT

## 2019-03-10 PROCEDURE — 83605 ASSAY OF LACTIC ACID: CPT

## 2019-03-10 PROCEDURE — 82803 BLOOD GASES ANY COMBINATION: CPT

## 2019-03-10 PROCEDURE — 86900 BLOOD TYPING SEROLOGIC ABO: CPT

## 2019-03-10 PROCEDURE — 71045 X-RAY EXAM CHEST 1 VIEW: CPT | Mod: 26

## 2019-03-10 PROCEDURE — 82330 ASSAY OF CALCIUM: CPT

## 2019-03-10 PROCEDURE — 80197 ASSAY OF TACROLIMUS: CPT

## 2019-03-10 PROCEDURE — 84100 ASSAY OF PHOSPHORUS: CPT

## 2019-03-10 PROCEDURE — 80048 BASIC METABOLIC PNL TOTAL CA: CPT

## 2019-03-10 PROCEDURE — 82947 ASSAY GLUCOSE BLOOD QUANT: CPT

## 2019-03-10 PROCEDURE — 84295 ASSAY OF SERUM SODIUM: CPT

## 2019-03-10 PROCEDURE — 86850 RBC ANTIBODY SCREEN: CPT

## 2019-03-10 PROCEDURE — 93005 ELECTROCARDIOGRAM TRACING: CPT

## 2019-03-10 PROCEDURE — 84132 ASSAY OF SERUM POTASSIUM: CPT

## 2019-03-10 PROCEDURE — 83735 ASSAY OF MAGNESIUM: CPT

## 2019-03-10 RX ORDER — MYCOPHENOLATE MOFETIL 250 MG/1
1000 CAPSULE ORAL
Qty: 0 | Refills: 0 | Status: DISCONTINUED | OUTPATIENT
Start: 2019-03-10 | End: 2019-03-10

## 2019-03-10 RX ORDER — FUROSEMIDE 40 MG
1 TABLET ORAL
Qty: 0 | Refills: 0 | COMMUNITY

## 2019-03-10 RX ORDER — VALGANCICLOVIR 450 MG/1
1 TABLET, FILM COATED ORAL
Qty: 0 | Refills: 0 | DISCHARGE
Start: 2019-03-10

## 2019-03-10 RX ORDER — TACROLIMUS 5 MG/1
1.5 CAPSULE ORAL
Qty: 0 | Refills: 0 | COMMUNITY

## 2019-03-10 RX ORDER — IRON SUCROSE 20 MG/ML
100 INJECTION, SOLUTION INTRAVENOUS EVERY 24 HOURS
Qty: 0 | Refills: 0 | Status: DISCONTINUED | OUTPATIENT
Start: 2019-03-10 | End: 2019-03-10

## 2019-03-10 RX ORDER — PANTOPRAZOLE SODIUM 20 MG/1
1 TABLET, DELAYED RELEASE ORAL
Qty: 0 | Refills: 0 | DISCHARGE
Start: 2019-03-10

## 2019-03-10 RX ORDER — TACROLIMUS 5 MG/1
3 CAPSULE ORAL
Qty: 0 | Refills: 0 | DISCHARGE
Start: 2019-03-10

## 2019-03-10 RX ORDER — MAGNESIUM SULFATE 500 MG/ML
2 VIAL (ML) INJECTION ONCE
Qty: 0 | Refills: 0 | Status: COMPLETED | OUTPATIENT
Start: 2019-03-10 | End: 2019-03-10

## 2019-03-10 RX ADMIN — Medication 50 GRAM(S): at 08:15

## 2019-03-10 RX ADMIN — Medication 5 UNIT(S): at 12:25

## 2019-03-10 RX ADMIN — VALGANCICLOVIR 450 MILLIGRAM(S): 450 TABLET, FILM COATED ORAL at 12:26

## 2019-03-10 RX ADMIN — Medication 25 UNIT(S): at 08:15

## 2019-03-10 RX ADMIN — PANTOPRAZOLE SODIUM 40 MILLIGRAM(S): 20 TABLET, DELAYED RELEASE ORAL at 06:02

## 2019-03-10 RX ADMIN — SODIUM CHLORIDE 75 MILLILITER(S): 9 INJECTION, SOLUTION INTRAVENOUS at 06:02

## 2019-03-10 RX ADMIN — Medication 5 UNIT(S): at 08:24

## 2019-03-10 RX ADMIN — IRON SUCROSE 210 MILLIGRAM(S): 20 INJECTION, SOLUTION INTRAVENOUS at 12:23

## 2019-03-10 RX ADMIN — Medication 2: at 08:25

## 2019-03-10 RX ADMIN — Medication 5 MILLIGRAM(S): at 06:02

## 2019-03-10 RX ADMIN — TACROLIMUS 1.5 MILLIGRAM(S): 5 CAPSULE ORAL at 06:02

## 2019-03-10 RX ADMIN — Medication 1 TABLET(S): at 12:26

## 2019-03-10 NOTE — PROGRESS NOTE ADULT - ATTENDING COMMENTS
s/p DDRT 4 weeks ago admitted with UGIB, acute blood loss anemia. history of prior bleed from marginal ulcer (benson-en-y bypass).  EGD showing non-bleeding ulcer, clips placed  Cont PPI bid  immunosuppression - Prograf 1.5 mg bid; prednisone 5mg daily, cellcept held  liquid diet as tolerated
s/p DDRT 4 weeks ago admitted with UGIB, acute blood loss anemia. history of prior bleed from marginal ulcer (benson-en-y bypass).  monitor Hct, transfuse rbc as needed  GI for EGD  PPI  immunosuppression - Prograf 1.5 mg bid; prednisone 5mg daily, cellcept held
s/p DDRT 4 weeks ago admitted with UGIB, acute blood loss anemia. history of prior bleed from marginal ulcer (benson-en-y bypass).  EGD showing non-bleeding ulcer, clips placed. No signa of active bleeding  Cont PPI bid  immunosuppression - Prograf 1.5 mg bid; prednisone 5mg daily, cellcept held  diet advanced  d/c home with f/u this week in the office

## 2019-03-10 NOTE — DISCHARGE NOTE PROVIDER - NSDCCPCAREPLAN_GEN_ALL_CORE_FT
PRINCIPAL DISCHARGE DIAGNOSIS  Problem: Gastric ulcer  Assessment and Plan of Treatment: Take protonix by mouth twice a day. Avoid foods and drinks that cause discomfort for you. Avoid drugs such as aspirin, ibuprofen (advil) or naproxen. Take tylenol to relieve pain if needed. Call the doctor if you develop sudden, sharp abdominal pain, vomit blood, see blood in your stool, feel dizzy or lightheaded.

## 2019-03-10 NOTE — DISCHARGE NOTE PROVIDER - HOSPITAL COURSE
74 year old male with a PMH of ESRD s/p DDRT on 2/1/19, DM, obesity (lost 600lbs), GODFREY, hypothyroidism, RNY gastric bypass in 2005 (Dr. Mckeon), GI bleed in 2016 from a marginal ulcer (according to paper documentation from [facility]) which required resection and revision (Dr. Mckeon), and a subsequent GI bleed that was temporized by endoscopy.  Patient transferred form [facility] where he presented with melena along with bright red blood with straining. At [facility], H/H was 7.4/24 and two units of PRBCs were given. His vitals were stable. Transferred to [facility] 3/7 for further care.  Underwent upper endoscopy on 3/8 that showed a non bleeding gastric ulcer. Started on Protonix twice a day. H/H remained stable, did not require additional blood transfusion. Pt discharged on 3/10 in stable condition. Immuno on discharge: Prograf 1.5mg bid, MMF 1g bid (held on admission, restarted at discharge), Prednisone 5mg daily. 74 year old male with a PMH of ESRD s/p DDRT on 2/1/19, DM, obesity (lost 600lbs), GODFREY, hypothyroidism, RNY gastric bypass in 2005 (Dr. Mckeon), GI bleed in 2016 from a marginal ulcer (according to paper documentation from [facility]) which required resection and revision (Dr. Mckeon), and a subsequent GI bleed that was temporized by endoscopy.  Patient transferred form [facility] where he presented with melena along with bright red blood with straining. At [facility], H/H was 7.4/24 and two units of PRBCs were given. His vitals were stable. Transferred to [facility] 3/7 for further care.  Underwent upper endoscopy on 3/8 that showed a non bleeding gastric ulcer. Started on Protonix twice a day. H/H remained stable, did not require additional blood transfusion. Pt discharged on 3/10 in stable condition. Immuno on discharge: Prograf 1.5mg bid, MMF 1g bid (held on admission, restarted at discharge), Prednisone 5mg daily. 74 year old male with a PMH of ESRD s/p DDRT on 2/1/19, DM, obesity (lost 600lbs), GODFREY, hypothyroidism, RNY gastric bypass in 2005 (Dr. Mckeon), GI bleed in 2016 from a marginal ulcer (according to paper documentation from [facility]) which required resection and revision (Dr. Mckeon), and a subsequent GI bleed that was temporized by endoscopy.  Patient transferred form [facility] where he presented with melena along with bright red blood with straining. At [facility], H/H was 7.4/24 and two units of PRBCs were given. His vitals were stable. Transferred to [facility] 3/7 for further care.  Underwent upper endoscopy on 3/8 that showed a non bleeding gastric ulcer. Started on Protonix twice a day. H/H remained stable, did not require additional blood transfusion. Pt discharged on 3/10 in stable condition. Immuno on discharge: Prograf 1.5mg bid, MMF 1g bid (held on admission, restarted at discharge), Prednisone 5mg daily.  Patient was seen and evaluated by the multi-disciplinary team including surgeon, nephrologist, NP/PA and nursing and deemed stable for discharge to home with close outpatient follow up.

## 2019-03-10 NOTE — DISCHARGE NOTE PROVIDER - CARE PROVIDER_API CALL
Tyrone Waggoner (DO)  Nephrology  [address] [place] [address]  Phone: [phone]  Fax: [phone]  Follow Up Time:

## 2019-03-10 NOTE — PROGRESS NOTE ADULT - NSHPATTENDINGPLANDISCUSS_GEN_ALL_CORE
Patient seen on multidisciplinary rounds with Transplant surgeons, nephrologist, NP/PA, pharmacist, and nurse.
nephrologist, NP, nurse
nephrologist, NP, PA, nurse

## 2019-03-10 NOTE — PROGRESS NOTE ADULT - PROBLEM SELECTOR PLAN 1
GI consult appreciate it, S/P upper endoscopy 3/8/19 with clipping of non bleeding gastric ulcer.   -Protonix PO BID  -IVFs @60ml/hr    -Strict Is and Os  - Continue to hold lasix   SCDs for DVT px.  No chemical DVT px while patient has GI bleed.  Started oral diet david well  Added  IV iron x3 days started yesterday for anemia
GI consult appreciate it, S/P upper endoscopy yesterday with clipping of non bleeding gastric ulcer.   -Protonix PO BID  -IVFs - changed to D5NS at 120cc/hr in the setting of levemir  -Strict Is and Os  - Hold lasix   SCDs for DVT px.  No chemical DVT px while patient has GI bleed.  Start clears and advance as david  Add one dose of IV iron x1 today
GI consulted - Spoke with fellow Dr. Paula Yoo.  Plan for upper endoscopy today.   -NPO   -Protonix loaded with 80mg IV and protonix gtt started  -IVFs - NS at 120cc/hr  -Strict Is and Os  -Q4 CBCs and monitor hemodynamics.   - Hold lasix   -SCDs for DVT px.  No chemical DVT px while patient has GI bleed.

## 2019-03-10 NOTE — PROGRESS NOTE ADULT - PROBLEM SELECTOR PLAN 2
s/p DDRT 2/1/19    Good graft function with good urine output    strict I&Os  Daily cbc/ bmp   replete electrolytes as needed  Pt encouraged to ambulate

## 2019-03-10 NOTE — PROGRESS NOTE ADULT - SUBJECTIVE AND OBJECTIVE BOX
Transplant Surgery - Multidisciplinary Rounds  --------------------------------------------------------------  DDRT on 2/1/19, admitted on 3/7/2019 R/O GIB    Present:   Patient seen with multidisciplinary team including Transplant Surgeon: Dr. Laws, RUTH Garnett in am rounds and examined with Dr. Harrell.  Disciplines not in attendance will be notified of the plan.     Patient is a 74 year old male with a PMH of ESRD s/p DDRT on 2/1/19, DM, obesity (lost 600lbs), GODFREY, hypothyroidism, RNY gastric bypass in 2005 (Dr. Mckeon), GI bleed in 2016 from a marginal ulcer (according to paper documentation from [facility]) which required resection and revision (Dr. Mckeon), and a subsequent GI bleed that was temporized by endoscopy.  He presented to [facility] yesterday after having two episodes of dark tarry stools along with bright red blood with straining.  He is also having abdominal pain and one episode of nausea yesterday, but no vomiting.  At [facility], his H/H was 7.4/24 and he had received two units of PRBCs.  His vitals were stable there and he was transferred to [facility] for further care.  Here, he had one episode of a large dark black stool with no bright red blood.  He denies any nausea, vomiting, or bloating currently.    Donor info: Donor: [facility] donor incarcerated COD: Drug overdose, anoxia. Elevated terminal creatinine in donor kidney.  Donor ID: [id number]  58y/o  CMV - /EBV + right kidney ABO: O KDPI: 81% cross clamp time: 01/31/2019 16:32hours.    Interval events: Patient seen and examined. No acute events overnight. Started on oral diet no nausea/vomiting. H/H this am 7.5/21.3, S/P endoscopy 3/8/19 found non bleeding gastric ulcer clipped and started on PPI PO BID.     Potential Discharge date: 3/10/19    Education:  Medications    Plan of care:  See Below       MEDICATIONS  (STANDING):  dextrose 5% + sodium chloride 0.9%. 1000 milliLiter(s) (120 mL/Hr) IV Continuous <Continuous>  insulin detemir injectable (LEVEMIR) 12 Unit(s) SubCutaneous daily  insulin lispro (HumaLOG) corrective regimen sliding scale   SubCutaneous every 6 hours  iron sucrose IVPB 100 milliGRAM(s) IV Intermittent once  pantoprazole    Tablet 40 milliGRAM(s) Oral two times a day  predniSONE   Tablet 5 milliGRAM(s) Oral daily  tacrolimus 1.5 milliGRAM(s) Oral two times a day  trimethoprim   80 mG/sulfamethoxazole 400 mG 1 Tablet(s) Oral daily  valGANciclovir 450 milliGRAM(s) Oral daily    MEDICATIONS  (PRN):      PAST MEDICAL & SURGICAL HISTORY:  Marginal ulcer  DM2 (diabetes mellitus, type 2)  Morbid obesity  Hypothyroidism  ESRD on peritoneal dialysis  HLD (hyperlipidemia)  History of partial gastrectomy  History of cholecystectomy  History of Porsha-en-Y gastric bypass      Vital Signs Last 24 Hrs  T(C): 36.7 (09 Mar 2019 05:08), Max: 36.7 (09 Mar 2019 05:08)  T(F): 98 (09 Mar 2019 05:08), Max: 98 (09 Mar 2019 05:08)  HR: 72 (09 Mar 2019 07:13) (62 - 72)  BP: 135/72 (09 Mar 2019 07:13) (101/62 - 155/84)  BP(mean): --  RR: 18 (09 Mar 2019 05:08) (18 - 18)  SpO2: 96% (09 Mar 2019 05:08) (96% - 97%)    I&O's Summary    08 Mar 2019 07:01  -  09 Mar 2019 07:00  --------------------------------------------------------  IN: 2710 mL / OUT: 1650 mL / NET: 1060 mL                              7.8    6.0   )-----------( 177      ( 09 Mar 2019 06:35 )             22.1     03-08    143  |  112<H>  |  34<H>  ----------------------------<  142<H>  5.1   |  20<L>  |  1.37<H>    Ca    8.7      08 Mar 2019 08:55  Phos  2.4     03-09  Mg     1.9     03-09      Tacrolimus (), Serum: 5.5 ng/mL (03-08 @ 08:47)       Review of systems  Gen: No weight changes, fatigue, fevers/chills, weakness  Skin: No rashes  Head/Eyes/Ears/Mouth: No headache; Normal hearing; Normal vision w/o blurriness; No sinus pain/discomfort, sore throat  Respiratory: No dyspnea, cough, wheezing, hemoptysis  CV: No chest pain, PND, orthopnea  GI: C/O mild abdominal pain , Pos melana, No diarrhea, constipation, nausea, vomiting,  hematochezia  : No increased frequency, dysuria, hematuria, nocturia  MSK: No joint pain/swelling; no back pain; no edema  Neuro: No dizziness/lightheadedness, weakness, seizures, numbness, tingling  Heme: No easy bruising or bleeding  Endo: No heat/cold intolerance  Psych: No significant nervousness, anxiety, stress, depression  All other systems were reviewed and are negative, except as noted.      PHYSICAL EXAM:  Constitutional: Well developed / well nourished  Eyes: Anicteric, PERRLA  ENMT: nc/at  Neck: supple  Respiratory: CTA B/L  Cardiovascular: RRR  Gastrointestinal: Soft, obese, abdomen,  Non peritoneal, non tender to touch , ND,   Genitourinary: Voiding spontaneously  Extremities: SCD's in place and working bilaterally  Vascular: Palpable dp pulses bilaterally  Neurological: A&O x3  Skin: PD cath insitu, well healed surgical incision no erythema and evidence of infection noted  Musculoskeletal: Moving all extremities  Psychiatric: Responsive Transplant Surgery - Multidisciplinary Rounds  --------------------------------------------------------------  DDRT on 2/1/19, admitted on 3/7/2019 R/O GIB    Present:   Patient seen with multidisciplinary team including Transplant Surgeon: Dr. Laws, RUTH Garnett in am rounds and examined with Dr. Harrell.  Disciplines not in attendance will be notified of the plan.     Patient is a 74 year old male with a PMH of ESRD s/p DDRT on 2/1/19, DM, obesity (lost 600lbs), GODFREY, hypothyroidism, RNY gastric bypass in 2005 (Dr. Mckeon), GI bleed in 2016 from a marginal ulcer (according to paper documentation from [facility]) which required resection and revision (Dr. Mckeon), and a subsequent GI bleed that was temporized by endoscopy.  He presented to [facility] yesterday after having two episodes of dark tarry stools along with bright red blood with straining.  He is also having abdominal pain and one episode of nausea yesterday, but no vomiting.  At [facility], his H/H was 7.4/24 and he had received two units of PRBCs.  His vitals were stable there and he was transferred to [facility] for further care.  Here, he had one episode of a large dark black stool with no bright red blood.  He denies any nausea, vomiting, or bloating currently.    Donor info: Donor: [facility] donor incarcerated COD: Drug overdose, anoxia. Elevated terminal creatinine in donor kidney.  Donor ID: [id number]  60y/o  CMV - /EBV + right kidney ABO: O KDPI: 81% cross clamp time: 01/31/2019 16:32hours.    Interval events: Patient seen and examined. No acute events overnight. Started on oral diet no nausea/vomiting. H/H this am 7.5/21.3, S/P endoscopy 3/8/19 found non bleeding gastric ulcer clipped and started on PPI PO BID.     Potential Discharge date: 3/10/19    Education:  Medications    Plan of care:  See Below     MEDICATIONS  (STANDING):  dextrose 5% + sodium chloride 0.9%. 1000 milliLiter(s) (75 mL/Hr) IV Continuous <Continuous>  insulin detemir injectable (LEVEMIR) 25 Unit(s) SubCutaneous daily  insulin lispro (HumaLOG) corrective regimen sliding scale   SubCutaneous every 6 hours  insulin lispro Injectable (HumaLOG) 5 Unit(s) SubCutaneous three times a day before meals  iron sucrose IVPB 100 milliGRAM(s) IV Intermittent every 24 hours  pantoprazole    Tablet 40 milliGRAM(s) Oral two times a day  predniSONE   Tablet 5 milliGRAM(s) Oral daily  tacrolimus 1.5 milliGRAM(s) Oral two times a day  trimethoprim   80 mG/sulfamethoxazole 400 mG 1 Tablet(s) Oral daily  valGANciclovir 450 milliGRAM(s) Oral daily    MEDICATIONS  (PRN):      PAST MEDICAL & SURGICAL HISTORY:  Marginal ulcer  DM2 (diabetes mellitus, type 2)  Morbid obesity  Hypothyroidism  ESRD on peritoneal dialysis  HLD (hyperlipidemia)  History of partial gastrectomy  History of cholecystectomy  History of Porsha-en-Y gastric bypass      Vital Signs Last 24 Hrs  T(C): 36.7 (10 Mar 2019 05:35), Max: 37 (09 Mar 2019 10:45)  T(F): 98.1 (10 Mar 2019 05:35), Max: 98.6 (09 Mar 2019 10:45)  HR: 72 (10 Mar 2019 05:35) (65 - 85)  BP: 129/68 (10 Mar 2019 05:35) (112/71 - 137/76)  BP(mean): --  RR: 18 (10 Mar 2019 05:35) (17 - 18)  SpO2: 98% (10 Mar 2019 05:35) (98% - 99%)    I&O's Summary    09 Mar 2019 06:01  -  10 Mar 2019 07:00  --------------------------------------------------------  IN: 2540 mL / OUT: 2751 mL / NET: -211 mL                              7.5    5.1   )-----------( 164      ( 10 Mar 2019 06:07 )             21.3     03-10    138  |  111<H>  |  19  ----------------------------<  142<H>  4.7   |  18<L>  |  1.17    Ca    8.3<L>      10 Mar 2019 06:07  Phos  2.0     03-10  Mg     1.7     03-10      Tacrolimus (), Serum: 5.9 ng/mL (03-09 @ 10:04)      >>> <<<     Review of systems  Gen: No weight changes, fatigue, fevers/chills, weakness  Skin: No rashes  Head/Eyes/Ears/Mouth: No headache; Normal hearing; Normal vision w/o blurriness; No sinus pain/discomfort, sore throat  Respiratory: No dyspnea, cough, wheezing, hemoptysis  CV: No chest pain, PND, orthopnea  GI: C/O mild abdominal pain , Pos melana, No diarrhea, constipation, nausea, vomiting,  hematochezia  : No increased frequency, dysuria, hematuria, nocturia  MSK: No joint pain/swelling; no back pain; no edema  Neuro: No dizziness/lightheadedness, weakness, seizures, numbness, tingling  Heme: No easy bruising or bleeding  Endo: No heat/cold intolerance  Psych: No significant nervousness, anxiety, stress, depression  All other systems were reviewed and are negative, except as noted.      PHYSICAL EXAM:  Constitutional: Well developed / well nourished  Eyes: Anicteric, PERRLA  ENMT: nc/at  Neck: supple  Respiratory: CTA B/L  Cardiovascular: RRR  Gastrointestinal: Soft, obese, abdomen,  Non peritoneal, non tender to touch , ND,   Genitourinary: Voiding spontaneously  Extremities: SCD's in place and working bilaterally  Vascular: Palpable dp pulses bilaterally  Neurological: A&O x3  Skin: PD cath insitu, well healed surgical incision no erythema and evidence of infection noted  Musculoskeletal: Moving all extremities  Psychiatric: Responsive

## 2019-03-10 NOTE — PROGRESS NOTE ADULT - PROBLEM SELECTOR PLAN 4
Controlled with levemir    Humalog home dose of 5units pre-meals  Continue sliding scale coverage, and monitor BG  Clears
Controlled with levemir will change back to home dose when start reg diabetic diet  Continue sliding scale coverage, and monitor BG  Clears
controlled  c/w levemir continue  1/2 home dose while NPO  Continue sliding scale coverage, and monitor BG  NPO for now

## 2019-03-10 NOTE — PROGRESS NOTE ADULT - REASON FOR ADMISSION
Gastrointestinal Bleeding

## 2019-03-10 NOTE — DISCHARGE NOTE NURSING/CASE MANAGEMENT/SOCIAL WORK - NSDCDPATPORTLINK_GEN_ALL_CORE
You can access the [facility] Portal, offered by [facility], by registering with the following website: [email or web address]

## 2019-03-10 NOTE — PROGRESS NOTE ADULT - ASSESSMENT
Patient is a 74 year old male with a PMH of ESRD s/p DDRT on 2/1/19, RNY gastric bypass in 2005 (Dr. Mckeon), GI bleed in 2016 from a marginal ulcer which required resection and revision (Dr. Mckeon), and a subsequent GI bleed that was temporized by endoscopy who presents with a GI bleed for 2 days. S/P EGD with clipping x1 non bleeding gastric ulcer. Patient is a 74 year old male with a PMH of ESRD s/p DDRT on 2/1/19, RNY gastric bypass in 2005 (Dr. Mckeon), GI bleed in 2016 from a marginal ulcer which required resection and revision (Dr. Mckeon), and a subsequent GI bleed that was temporized by endoscopy who presents with a GI bleed for 2 days. S/P EGD with clipping x1 non bleeding gastric ulcer. Now doing well on PO diet no more melena plan for discharge home today with close follow up.

## 2019-03-10 NOTE — PROGRESS NOTE ADULT - PROBLEM SELECTOR PLAN 3
On Tacrolimus, pred, bactrim, and Valcyte   Hold MMF   F/U Tacrolimus level daily, tacro goal 8-10  Monitor closely. On Tacrolimus, pred, bactrim, and Valcyte   Restart MMF 1gm BID today   F/U Tacrolimus level daily, tacro goal 8-10  Monitor closely.

## 2019-03-12 ENCOUNTER — APPOINTMENT (OUTPATIENT)
Dept: NEPHROLOGY | Facility: CLINIC | Age: 74
End: 2019-03-12

## 2019-03-12 ENCOUNTER — APPOINTMENT (OUTPATIENT)
Dept: TRANSPLANT | Facility: CLINIC | Age: 74
End: 2019-03-12

## 2019-03-12 PROBLEM — K28.9 GASTROJEJUNAL ULCER, UNSPECIFIED AS ACUTE OR CHRONIC, WITHOUT HEMORRHAGE OR PERFORATION: Chronic | Status: ACTIVE | Noted: 2019-03-07

## 2019-03-13 ENCOUNTER — TRANSCRIPTION ENCOUNTER (OUTPATIENT)
Age: 74
End: 2019-03-13

## 2019-03-13 RX ORDER — CEFAZOLIN SODIUM 1 G
2000 VIAL (EA) INJECTION ONCE
Qty: 0 | Refills: 0 | Status: DISCONTINUED | OUTPATIENT
Start: 2019-03-14 | End: 2019-03-29

## 2019-03-14 ENCOUNTER — TRANSCRIPTION ENCOUNTER (OUTPATIENT)
Age: 74
End: 2019-03-14

## 2019-03-14 ENCOUNTER — OUTPATIENT (OUTPATIENT)
Dept: OUTPATIENT SERVICES | Facility: HOSPITAL | Age: 74
LOS: 1 days | End: 2019-03-14
Payer: MEDICARE

## 2019-03-14 ENCOUNTER — APPOINTMENT (OUTPATIENT)
Dept: TRANSPLANT | Facility: HOSPITAL | Age: 74
End: 2019-03-14

## 2019-03-14 VITALS
RESPIRATION RATE: 17 BRPM | HEART RATE: 86 BPM | OXYGEN SATURATION: 97 % | DIASTOLIC BLOOD PRESSURE: 64 MMHG | SYSTOLIC BLOOD PRESSURE: 126 MMHG

## 2019-03-14 VITALS
SYSTOLIC BLOOD PRESSURE: 136 MMHG | HEART RATE: 74 BPM | WEIGHT: 238.1 LBS | TEMPERATURE: 98 F | HEIGHT: 71 IN | RESPIRATION RATE: 16 BRPM | OXYGEN SATURATION: 99 % | DIASTOLIC BLOOD PRESSURE: 74 MMHG

## 2019-03-14 DIAGNOSIS — Z90.3 ACQUIRED ABSENCE OF STOMACH [PART OF]: Chronic | ICD-10-CM

## 2019-03-14 DIAGNOSIS — Z94.0 KIDNEY TRANSPLANT STATUS: ICD-10-CM

## 2019-03-14 DIAGNOSIS — Z90.49 ACQUIRED ABSENCE OF OTHER SPECIFIED PARTS OF DIGESTIVE TRACT: Chronic | ICD-10-CM

## 2019-03-14 DIAGNOSIS — Z98.84 BARIATRIC SURGERY STATUS: Chronic | ICD-10-CM

## 2019-03-14 PROCEDURE — 82962 GLUCOSE BLOOD TEST: CPT

## 2019-03-14 PROCEDURE — 49422 REMOVE TUNNELED IP CATH: CPT

## 2019-03-14 PROCEDURE — 49422 REMOVE TUNNELED IP CATH: CPT | Mod: GC,58

## 2019-03-14 PROCEDURE — 52310 CYSTOSCOPY AND TREATMENT: CPT

## 2019-03-14 PROCEDURE — 52000 CYSTOURETHROSCOPY: CPT | Mod: GC,58

## 2019-03-14 RX ORDER — ACETAMINOPHEN 500 MG
975 TABLET ORAL ONCE
Qty: 0 | Refills: 0 | Status: COMPLETED | OUTPATIENT
Start: 2019-03-14 | End: 2019-03-14

## 2019-03-14 RX ORDER — HYDROMORPHONE HYDROCHLORIDE 2 MG/ML
0.5 INJECTION INTRAMUSCULAR; INTRAVENOUS; SUBCUTANEOUS
Qty: 0 | Refills: 0 | Status: DISCONTINUED | OUTPATIENT
Start: 2019-03-14 | End: 2019-03-14

## 2019-03-14 RX ORDER — HALOPERIDOL DECANOATE 100 MG/ML
1 INJECTION INTRAMUSCULAR
Qty: 0 | Refills: 0 | Status: DISCONTINUED | OUTPATIENT
Start: 2019-03-14 | End: 2019-03-29

## 2019-03-14 RX ORDER — SODIUM CHLORIDE 9 MG/ML
3 INJECTION INTRAMUSCULAR; INTRAVENOUS; SUBCUTANEOUS EVERY 8 HOURS
Qty: 0 | Refills: 0 | Status: DISCONTINUED | OUTPATIENT
Start: 2019-03-14 | End: 2019-03-14

## 2019-03-14 RX ORDER — HYDROMORPHONE HYDROCHLORIDE 2 MG/ML
0.25 INJECTION INTRAMUSCULAR; INTRAVENOUS; SUBCUTANEOUS
Qty: 0 | Refills: 0 | Status: DISCONTINUED | OUTPATIENT
Start: 2019-03-14 | End: 2019-03-14

## 2019-03-14 RX ORDER — ACETAMINOPHEN 500 MG
1000 TABLET ORAL ONCE
Qty: 0 | Refills: 0 | Status: COMPLETED | OUTPATIENT
Start: 2019-03-14 | End: 2019-03-14

## 2019-03-14 RX ORDER — OXYCODONE HYDROCHLORIDE 5 MG/1
5 TABLET ORAL ONCE
Qty: 0 | Refills: 0 | Status: DISCONTINUED | OUTPATIENT
Start: 2019-03-14 | End: 2019-03-14

## 2019-03-14 RX ORDER — OXYCODONE HYDROCHLORIDE 5 MG/1
1 TABLET ORAL
Qty: 10 | Refills: 0
Start: 2019-03-14

## 2019-03-14 RX ADMIN — OXYCODONE HYDROCHLORIDE 5 MILLIGRAM(S): 5 TABLET ORAL at 13:38

## 2019-03-14 RX ADMIN — Medication 975 MILLIGRAM(S): at 08:32

## 2019-03-14 RX ADMIN — HYDROMORPHONE HYDROCHLORIDE 0.25 MILLIGRAM(S): 2 INJECTION INTRAMUSCULAR; INTRAVENOUS; SUBCUTANEOUS at 12:55

## 2019-03-14 RX ADMIN — HYDROMORPHONE HYDROCHLORIDE 0.25 MILLIGRAM(S): 2 INJECTION INTRAMUSCULAR; INTRAVENOUS; SUBCUTANEOUS at 12:15

## 2019-03-14 RX ADMIN — HYDROMORPHONE HYDROCHLORIDE 0.25 MILLIGRAM(S): 2 INJECTION INTRAMUSCULAR; INTRAVENOUS; SUBCUTANEOUS at 11:55

## 2019-03-14 RX ADMIN — Medication 400 MILLIGRAM(S): at 12:00

## 2019-03-14 RX ADMIN — HYDROMORPHONE HYDROCHLORIDE 0.25 MILLIGRAM(S): 2 INJECTION INTRAMUSCULAR; INTRAVENOUS; SUBCUTANEOUS at 12:05

## 2019-03-14 RX ADMIN — HYDROMORPHONE HYDROCHLORIDE 0.25 MILLIGRAM(S): 2 INJECTION INTRAMUSCULAR; INTRAVENOUS; SUBCUTANEOUS at 12:04

## 2019-03-14 RX ADMIN — Medication 1000 MILLIGRAM(S): at 12:30

## 2019-03-14 NOTE — DISCHARGE NOTE PROVIDER - NSDCCPCAREPLAN_GEN_ALL_CORE_FT
PRINCIPAL DISCHARGE DIAGNOSIS  Diagnosis: S/p cadaver renal transplant  Assessment and Plan of Treatment:

## 2019-03-14 NOTE — PROGRESS NOTE ADULT - ATTENDING COMMENTS
75yo M h/o DDRTx.  Graft functioning well.  At the time of transplant a ureteral stent was placed.  He also has a PD catheter in place which has not been used.    Today we will take out the ureteral stent cystoscopically and remove the PD catheter.  We discussed the risks of both procedures including but not limited to bleeding, infection, damage to adjacent structures, damage to the transplant ureter, bladder or lower urinary tract.  The patient signed surgical consent after all of his questions were answered.

## 2019-03-14 NOTE — DISCHARGE NOTE PROVIDER - HOSPITAL COURSE
Went to OR for successful removal of PD catheter from abdomen and ureteral stent from transplant ureter

## 2019-03-14 NOTE — DISCHARGE NOTE NURSING/CASE MANAGEMENT/SOCIAL WORK - NSDCPNDISPN_GEN_ALL_CORE
Education provided on the pain management plan of care/Activities of daily living, including home environment that might     exacerbate pain or reduce effectiveness of the pain management plan of care as well as strategies to address these issues

## 2019-03-14 NOTE — DISCHARGE NOTE PROVIDER - CARE PROVIDER_API CALL
Srikanth Herbert)  Surgery  [address] [place] [address]  Phone: [phone]  Fax: [phone]  Follow Up Time:

## 2019-03-14 NOTE — DISCHARGE NOTE NURSING/CASE MANAGEMENT/SOCIAL WORK - NSDCDPATPORTLINK_GEN_ALL_CORE
You can access the [email or web address], offered by [facility], by registering with the following website: [email or web address]

## 2019-03-14 NOTE — PRE-ANESTHESIA EVALUATION ADULT - NSDENTALSD_ENT_ALL_CORE
caps/bridge/implants/permanent lower dentures; multiple caps crowns upper, nothing loose Split-Thickness Skin Graft Text: The defect edges were debeveled with a #15 scalpel blade.  Given the location of the defect, shape of the defect and the proximity to free margins a split thickness skin graft was deemed most appropriate.  Using a sterile surgical marker, the primary defect shape was transferred to the donor site. The split thickness graft was then harvested.  The skin graft was then placed in the primary defect and oriented appropriately.

## 2019-03-14 NOTE — DISCHARGE NOTE PROVIDER - NSDCCPTREATMENT_GEN_ALL_CORE_FT
PRINCIPAL PROCEDURE  Procedure: Transurethral removal of ureteral stent  Findings and Treatment:       SECONDARY PROCEDURE  Procedure: Removal of peritoneal dialysis catheter  Findings and Treatment:

## 2019-03-19 ENCOUNTER — APPOINTMENT (OUTPATIENT)
Dept: NEPHROLOGY | Facility: CLINIC | Age: 74
End: 2019-03-19
Payer: MEDICARE

## 2019-03-19 ENCOUNTER — APPOINTMENT (OUTPATIENT)
Dept: NEPHROLOGY | Facility: CLINIC | Age: 74
End: 2019-03-19

## 2019-03-19 VITALS
RESPIRATION RATE: 18 BRPM | DIASTOLIC BLOOD PRESSURE: 77 MMHG | OXYGEN SATURATION: 97 % | HEIGHT: 71 IN | SYSTOLIC BLOOD PRESSURE: 127 MMHG | WEIGHT: 242 LBS | TEMPERATURE: 97.7 F | HEART RATE: 68 BPM | BODY MASS INDEX: 33.88 KG/M2

## 2019-03-19 DIAGNOSIS — K27.9 PEPTIC ULCER, SITE UNSPECIFIED, UNSPECIFIED AS ACUTE OR CHRONIC, W/OUT HEMORRHAGE OR PERFORATION: ICD-10-CM

## 2019-03-19 LAB
ALBUMIN SERPL ELPH-MCNC: 3.8 G/DL
ALP BLD-CCNC: 75 U/L
ALT SERPL-CCNC: 14 U/L
ANION GAP SERPL CALC-SCNC: 9 MMOL/L
APPEARANCE: CLEAR
AST SERPL-CCNC: 16 U/L
BILIRUB SERPL-MCNC: 0.2 MG/DL
BILIRUBIN URINE: NEGATIVE
BLOOD URINE: NEGATIVE
BUN SERPL-MCNC: 18 MG/DL
CALCIUM SERPL-MCNC: 8.1 MG/DL
CHLORIDE SERPL-SCNC: 108 MMOL/L
CO2 SERPL-SCNC: 25 MMOL/L
COLOR: NORMAL
CREAT SERPL-MCNC: 1.21 MG/DL
GLUCOSE QUALITATIVE U: NEGATIVE
GLUCOSE SERPL-MCNC: 100 MG/DL
KETONES URINE: NEGATIVE
LDH SERPL-CCNC: 200 U/L
LEUKOCYTE ESTERASE URINE: NEGATIVE
MAGNESIUM SERPL-MCNC: 2 MG/DL
NITRITE URINE: NEGATIVE
PH URINE: 6
PHOSPHATE SERPL-MCNC: 2.5 MG/DL
POTASSIUM SERPL-SCNC: 5.1 MMOL/L
PROT SERPL-MCNC: 6.2 G/DL
PROTEIN URINE: NEGATIVE
SODIUM SERPL-SCNC: 142 MMOL/L
SPECIFIC GRAVITY URINE: 1.02
TACROLIMUS SERPL-MCNC: 3.9 NG/ML
URATE SERPL-MCNC: 6.5 MG/DL
UROBILINOGEN URINE: NORMAL

## 2019-03-19 PROCEDURE — 99214 OFFICE O/P EST MOD 30 MIN: CPT

## 2019-03-19 NOTE — PHYSICAL EXAM
[General Appearance - Alert] : alert [General Appearance - In No Acute Distress] : in no acute distress [Sclera] : the sclera and conjunctiva were normal [PERRL With Normal Accommodation] : pupils were equal in size, round, and reactive to light [Extraocular Movements] : extraocular movements were intact [Outer Ear] : the ears and nose were normal in appearance [Oropharynx] : the oropharynx was normal [Neck Appearance] : the appearance of the neck was normal [Jugular Venous Distention Increased] : there was no jugular-venous distention [Neck Cervical Mass (___cm)] : no neck mass was observed [Thyroid Nodule] : there were no palpable thyroid nodules [Thyroid Diffuse Enlargement] : the thyroid was not enlarged [Auscultation Breath Sounds / Voice Sounds] : lungs were clear to auscultation bilaterally [Heart Rate And Rhythm] : heart rate was normal and rhythm regular [Heart Sounds] : normal S1 and S2 [Heart Sounds Gallop] : no gallops [Murmurs] : no murmurs [Heart Sounds Pericardial Friction Rub] : no pericardial rub [Edema] : there was no peripheral edema [Bowel Sounds] : normal bowel sounds [Abdomen Soft] : soft [Abdomen Tenderness] : non-tender [Cervical Lymph Nodes Enlarged Posterior Bilaterally] : posterior cervical [Cervical Lymph Nodes Enlarged Anterior Bilaterally] : anterior cervical [Supraclavicular Lymph Nodes Enlarged Bilaterally] : supraclavicular [Axillary Lymph Nodes Enlarged Bilaterally] : axillary [Inguinal Lymph Nodes Enlarged Bilaterally] : inguinal [Involuntary Movements] : no involuntary movements were seen [FreeTextEntry1] : PD catheter has been removed [] : no rash [No Focal Deficits] : no focal deficits [Oriented To Time, Place, And Person] : oriented to person, place, and time [Impaired Insight] : insight and judgment were intact [Affect] : the affect was normal

## 2019-03-19 NOTE — HISTORY OF PRESENT ILLNESS
[FreeTextEntry1] : 74 years old male, born in [place], [place]. \par He received  donor kidney transplant on 19 at [facility] (Surgeon-Srikanth Herbert)\par He was on peritoneal dialysis for 3 months and 1.5 years on hemodialysis prior to that prior to transplant. ESRD, kidney stones,  diabetic. No CAD/stents. Pre transplant was on insulin. Has hypothyroidism.\par Other pre transplant issues that need follow up: reviewed. 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 He started being diabetic after the stroid use.He became 735 Lbs, and had gastric bypass in  for weight loss. (Dr. Mckeon). Also had gastric ulcer surgery. Has left arm  AVF.\par Donor characteristics reviewed.\par Donor:  Directed donor.  he is president of a senior club and one of the members is the  donor's neighbor.\par UNOS Donor ID: [id number]\par Match: [id number]\par OPO:  [place] on [place]\par Donor Age: 59\par ABO:  O\par KDPI: 81%\par COD: anoxia – drug intoxication\par Medical Hx: Asthma, Depression, drug use (cocaine), ETOH\par Terminal Scr ~2.8-3.0\par CMV- /EBV +\par Donor age-59\par Cold Ischemia time : noted\par CMV status: Donor negative\par Anatomy unremarkable\par Has ureteral stent. Has staples.\par PRA was 0%. HLA Match: Reviewed.\par Induction regimen noted. Simulect induction, Tac/MMF/Prednisone\par Currently taking Tacrolimus 1.5 mg am and 1.5 mg PM, cellcept 1 gm /dose and prednisone 5.\par He is on prophylaxis regimen: Bactrim, Valcyte, Clotrimazole\par Blood pressure control: On no meds\par Glucose control regimen: Levimir 25 units taking once daily AM and Novolog 5 u pre meal.\par Post op course: \par Delayed allograft function, had PD initially.\par Had post op visit with surgeon. \par He lives by himself, sister lives near by and has two friends, Alek and Stefanie live near him.\par Has no fever, no urinary symptoms except nocturia: 4-5 times/night at present\par Home blood pressure, temp, glucose and weight flow sheet reviewed.\par Occasional cough present. No expectoration. No SOB/chest pain. Has lose bowel movements. Has stopped taking miralax.\par Home glucose: Reviewed.\par Medications side effects: none noted.\par Adherence and understanding: Fair\par Functional /Employment status: Reviewed. Retired from [employer]. 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 Gagnon, [facility], previously Dr. Jones\par Dialysis [facility]: [facility]\par Endocrinologist: Gualberto Clark\par \par \par Update:\par Had ureteral stent and PD catheter removed on 3/14/19\par Had bleeding ulcer treated ([facility]) and transfused 3 units, now on Protonix\par He went dancing on 3/18/19\par Noted labs from 3/10 : normal creatinine\par Wakes up many times at night\par Ankle swelling has improved

## 2019-03-20 LAB
BASOPHILS # BLD AUTO: 0.06 K/UL
BASOPHILS NFR BLD AUTO: 0.8 %
BKV DNA SPEC QL NAA+PROBE: NOT DETECTED COPIES/ML
CREAT SPEC-SCNC: 78 MG/DL
CREAT/PROT UR: 0.2 RATIO
EOSINOPHIL # BLD AUTO: 0.17 K/UL
EOSINOPHIL NFR BLD AUTO: 2.2 %
HCT VFR BLD CALC: 28.3 %
HGB BLD-MCNC: 8.2 G/DL
IMM GRANULOCYTES NFR BLD AUTO: 0.5 %
LYMPHOCYTES # BLD AUTO: 1.22 K/UL
LYMPHOCYTES NFR BLD AUTO: 16 %
MAN DIFF?: NORMAL
MCHC RBC-ENTMCNC: 29 GM/DL
MCHC RBC-ENTMCNC: 31.7 PG
MCV RBC AUTO: 109.3 FL
MONOCYTES # BLD AUTO: 0.33 K/UL
MONOCYTES NFR BLD AUTO: 4.3 %
NEUTROPHILS # BLD AUTO: 5.82 K/UL
NEUTROPHILS NFR BLD AUTO: 76.2 %
PLATELET # BLD AUTO: 252 K/UL
PROT UR-MCNC: 12 MG/DL
RBC # BLD: 2.59 M/UL
RBC # FLD: 16.8 %
WBC # FLD AUTO: 7.64 K/UL

## 2019-04-03 ENCOUNTER — APPOINTMENT (OUTPATIENT)
Dept: TRANSPLANT | Facility: CLINIC | Age: 74
End: 2019-04-03
Payer: MEDICARE

## 2019-04-03 VITALS
DIASTOLIC BLOOD PRESSURE: 76 MMHG | OXYGEN SATURATION: 97 % | RESPIRATION RATE: 18 BRPM | TEMPERATURE: 98.1 F | BODY MASS INDEX: 33.57 KG/M2 | WEIGHT: 240.7 LBS | HEART RATE: 71 BPM | SYSTOLIC BLOOD PRESSURE: 132 MMHG

## 2019-04-03 PROCEDURE — 99214 OFFICE O/P EST MOD 30 MIN: CPT | Mod: 24

## 2019-04-03 RX ORDER — DIBASIC SODIUM PHOSPHATE, MONOBASIC POTASSIUM PHOSPHATE AND MONOBASIC SODIUM PHOSPHATE 852; 155; 130 MG/1; MG/1; MG/1
155-852-130 TABLET ORAL TWICE DAILY
Qty: 180 | Refills: 0 | Status: DISCONTINUED | COMMUNITY
Start: 2019-02-28 | End: 2019-04-03

## 2019-04-03 RX ORDER — FAMOTIDINE 20 MG/1
20 TABLET, FILM COATED ORAL
Qty: 30 | Refills: 11 | Status: DISCONTINUED | COMMUNITY
Start: 2019-02-05 | End: 2019-04-03

## 2019-04-03 RX ORDER — TRAMADOL HYDROCHLORIDE 50 MG/1
50 TABLET, COATED ORAL
Qty: 21 | Refills: 0 | Status: DISCONTINUED | COMMUNITY
Start: 2019-02-12 | End: 2019-04-03

## 2019-04-03 NOTE — ASSESSMENT
[FreeTextEntry1] : 72yo M sp DDRTx. \par \par 1) Graft - euvolemic, suspect good graft function. Labs today\par \par 2) Immuno - tac goal 8-10, cellcept 1gm BID, pred 5mg\par \par 3) DM - on Lantus, Novolog. Followed by endo.  In process of obtaining CGM.\par \par 4) HTN - well controlled. Not on meds.\par \par 5) PPx - Valcyte qd, Bactrim, Clotrimazole\par \par 6) Edema - resolved.  D/C lasix.

## 2019-04-03 NOTE — REASON FOR VISIT
[de-identified] : 74yo M sp DDRTx on 2/1 with 58yo KDPI 81% ABELARDO graft (DONOR ID [id number], Match ID [id number]).\par \par INTERVAL EVENTS: \par Incision has completely healed. Experiencing no pain.  Urinating frequently.  BP under control.  Some high sugars, followed by endocrinology, in process of getting CGM.  Edema is much improved. \par \par REVIEW OF SYSTEMS:\par The patient denies any fever, weight change. Denies any sore throat, ear pain, rhinorrhea. Denies any double vision, blurred vision or eye pain. Denies any shortness of breath or pleuritic chest pain. The patient denies any nausea, vomiting or diarrhea. The patient does not have any dysuria, frequency or urgency. He has no myalgia or joint pain\par

## 2019-04-03 NOTE — PHYSICAL EXAM
[General Appearance - Alert] : alert [General Appearance - Well Nourished] : well nourished [General Appearance - Well Developed] : well developed [] : no respiratory distress [Abdomen Soft] : soft [Abdomen Tenderness] : non-tender [Abdomen Mass (___ Cm)] : no abdominal mass palpated [Site: ___] : Site: [unfilled] [FreeTextEntry1] : well healed [No Edema] : no edema [Oriented To Time, Place, And Person] : oriented to person, place, and time

## 2019-04-04 LAB
ALBUMIN SERPL ELPH-MCNC: 4.4 G/DL
ALP BLD-CCNC: 79 U/L
ALT SERPL-CCNC: 18 U/L
ANION GAP SERPL CALC-SCNC: 11 MMOL/L
APPEARANCE: CLEAR
AST SERPL-CCNC: 16 U/L
BACTERIA: NEGATIVE
BASOPHILS # BLD AUTO: 0.06 K/UL
BASOPHILS NFR BLD AUTO: 0.8 %
BILIRUB SERPL-MCNC: 0.2 MG/DL
BILIRUBIN URINE: NEGATIVE
BKV DNA SPEC QL NAA+PROBE: NOT DETECTED COPIES/ML
BLOOD URINE: NEGATIVE
BUN SERPL-MCNC: 24 MG/DL
CALCIUM SERPL-MCNC: 8.9 MG/DL
CHLORIDE SERPL-SCNC: 110 MMOL/L
CO2 SERPL-SCNC: 19 MMOL/L
COLOR: NORMAL
CREAT SERPL-MCNC: 1.05 MG/DL
CREAT SPEC-SCNC: 50 MG/DL
CREAT/PROT UR: 0.3 RATIO
EOSINOPHIL # BLD AUTO: 0.08 K/UL
EOSINOPHIL NFR BLD AUTO: 1.1 %
GLUCOSE QUALITATIVE U: NEGATIVE
GLUCOSE SERPL-MCNC: 94 MG/DL
HCT VFR BLD CALC: 33.7 %
HGB BLD-MCNC: 10 G/DL
HYALINE CASTS: 1 /LPF
IMM GRANULOCYTES NFR BLD AUTO: 0.4 %
KETONES URINE: NEGATIVE
LDH SERPL-CCNC: 185 U/L
LEUKOCYTE ESTERASE URINE: NEGATIVE
LYMPHOCYTES # BLD AUTO: 1.1 K/UL
LYMPHOCYTES NFR BLD AUTO: 15.3 %
MAGNESIUM SERPL-MCNC: 1.9 MG/DL
MAN DIFF?: NORMAL
MCHC RBC-ENTMCNC: 29.7 GM/DL
MCHC RBC-ENTMCNC: 32.2 PG
MCV RBC AUTO: 108.4 FL
MICROSCOPIC-UA: NORMAL
MONOCYTES # BLD AUTO: 0.44 K/UL
MONOCYTES NFR BLD AUTO: 6.1 %
NEUTROPHILS # BLD AUTO: 5.49 K/UL
NEUTROPHILS NFR BLD AUTO: 76.3 %
NITRITE URINE: NEGATIVE
PH URINE: 6
PHOSPHATE SERPL-MCNC: 2.2 MG/DL
PLATELET # BLD AUTO: 207 K/UL
POTASSIUM SERPL-SCNC: 5.6 MMOL/L
PROT SERPL-MCNC: 7 G/DL
PROT UR-MCNC: 13 MG/DL
PROTEIN URINE: NORMAL
RBC # BLD: 3.11 M/UL
RBC # FLD: 15.9 %
RED BLOOD CELLS URINE: 1 /HPF
SODIUM SERPL-SCNC: 140 MMOL/L
SPECIFIC GRAVITY URINE: 1.02
SQUAMOUS EPITHELIAL CELLS: 0 /HPF
TACROLIMUS SERPL-MCNC: 6.2 NG/ML
UROBILINOGEN URINE: NORMAL
WBC # FLD AUTO: 7.2 K/UL
WHITE BLOOD CELLS URINE: 1 /HPF

## 2019-04-08 ENCOUNTER — APPOINTMENT (OUTPATIENT)
Dept: NEPHROLOGY | Facility: CLINIC | Age: 74
End: 2019-04-08
Payer: MEDICARE

## 2019-04-08 VITALS
BODY MASS INDEX: 33.46 KG/M2 | OXYGEN SATURATION: 97 % | HEIGHT: 71 IN | WEIGHT: 239 LBS | DIASTOLIC BLOOD PRESSURE: 75 MMHG | HEART RATE: 84 BPM | SYSTOLIC BLOOD PRESSURE: 138 MMHG | TEMPERATURE: 98.2 F | RESPIRATION RATE: 16 BRPM

## 2019-04-08 PROCEDURE — 99214 OFFICE O/P EST MOD 30 MIN: CPT

## 2019-04-08 NOTE — REASON FOR VISIT
[Follow-Up] : a follow-up visit [FreeTextEntry1] : Post transplant follow up, has loose BM, taking senna

## 2019-04-08 NOTE — ASSESSMENT
[FreeTextEntry1] : Renal Transplant recipient: Had immediate allograft function, normal creatinine at discharge. Has urinary frequency and nocturia. No dysuria/hematuria. No fever/chills. Tolerating medications.\par Immunosuppression: reviewed; Simulect induction, on tac/MMF/prednisone, last Tac level noted; will recheck. Currently on 2 mg twice daily.; full dose MMF and prednisone at 5 mg daily\par DM: On Levemir 25 u   daily and NovoLog 5 units pre meal.  He was taking NovoLog 4 times daily, switched to 3 times daily premeal. Levemir he is taking it in the morning.\par Will continue to monitor and adjust treatment\par Hyperlipidemia: he was previously on Welchol had elevated LFT and joint/muscle pain with Lipitor. Will follow lipids\par Cardiovascular risk reduction discussed.  \par Peptic ulcer: On protonix\par Infection prophylaxis: On Bactrim, Valcyte and clotrimazole as well as GI prophylaxis.\par Discussed ambulation, using incentive spirometer, optimal glucose and blood pressure readings, adherence with medications and follow ups, follow up clinic visit schedule, avoiding dehydration, mosquito bites; prevention of DVT as well as food safety.\par He has met with transplant surgeon in the past; ureteral stent has been removed\par Had labs on April 3, took Tacrolimus already. Will do labs on next visit.\par

## 2019-04-08 NOTE — HISTORY OF PRESENT ILLNESS
[FreeTextEntry1] : 74 years old male, born in [place], [place]. \par He received  donor kidney transplant on 19 at [facility] (Surgeon-Srikanth Herbert)\par He was on peritoneal dialysis for 3 months and 1.5 years on hemodialysis prior to that prior to transplant. ESRD, kidney stones,  diabetic. No CAD/stents. Pre transplant was on insulin. Has hypothyroidism.\par Other pre transplant issues that need follow up: reviewed. 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 He started being diabetic after the stroid use.He became 735 Lbs, and had gastric bypass in  for weight loss. (Dr. Mckeon). Also had gastric ulcer surgery. Has left arm  AVF.\par Donor characteristics reviewed.\par Donor:  Directed donor.  he is president of a senior club and one of the members is the  donor's neighbor.\par UNOS Donor ID: [id number]\par Match: [id number]\par OPO:  [place] on [place]\par Donor Age: 59\par ABO:  O\par KDPI: 81%\par COD: anoxia – drug intoxication\par Medical Hx: Asthma, Depression, drug use (cocaine), ETOH\par Terminal Scr ~2.8-3.0\par CMV- /EBV +\par Donor age-59\par Cold Ischemia time : noted\par CMV status: Donor negative\par Anatomy unremarkable\par Has ureteral stent. Has staples.\par PRA was 0%. HLA Match: Reviewed.\par Induction regimen noted. Simulect induction, Tac/MMF/Prednisone\par Currently taking Tacrolimus 1.5 mg am and 1.5 mg PM, cellcept 1 gm /dose and prednisone 5.\par He is on prophylaxis regimen: Bactrim, Valcyte, Clotrimazole\par Blood pressure control: On no meds\par Glucose control regimen: Levimir 25 units taking once daily AM and Novolog 5 u pre meal.\par Post op course: \par Delayed allograft function, had PD initially.\par Had ureteral stent and PD catheter removed on 3/14/19\par Had bleeding ulcer treated (DAGOBERTO Mckeon) and transfused 3 units, now on Protonix\par  \par Functional /Employment status: Reviewed. 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 Gagnon, [facility], previously Dr. Jones\par Dialysis Unit: [facility]\par Endocrinologist: Gualberto Clark\par \par \par Update:\par \par Loose BM, on senna.\par Appetite, sleep ok.\par Overall feeling well.\par Ankle swelling has resolved.

## 2019-04-08 NOTE — PHYSICAL EXAM
[General Appearance - Alert] : alert [General Appearance - In No Acute Distress] : in no acute distress [Sclera] : the sclera and conjunctiva were normal [PERRL With Normal Accommodation] : pupils were equal in size, round, and reactive to light [Extraocular Movements] : extraocular movements were intact [Outer Ear] : the ears and nose were normal in appearance [Oropharynx] : the oropharynx was normal [Neck Appearance] : the appearance of the neck was normal [Neck Cervical Mass (___cm)] : no neck mass was observed [Jugular Venous Distention Increased] : there was no jugular-venous distention [Thyroid Diffuse Enlargement] : the thyroid was not enlarged [Thyroid Nodule] : there were no palpable thyroid nodules [Auscultation Breath Sounds / Voice Sounds] : lungs were clear to auscultation bilaterally [Heart Rate And Rhythm] : heart rate was normal and rhythm regular [Heart Sounds] : normal S1 and S2 [Heart Sounds Gallop] : no gallops [Murmurs] : no murmurs [Heart Sounds Pericardial Friction Rub] : no pericardial rub [Edema] : there was no peripheral edema [Bowel Sounds] : normal bowel sounds [Abdomen Soft] : soft [Abdomen Tenderness] : non-tender [Cervical Lymph Nodes Enlarged Posterior Bilaterally] : posterior cervical [Cervical Lymph Nodes Enlarged Anterior Bilaterally] : anterior cervical [Supraclavicular Lymph Nodes Enlarged Bilaterally] : supraclavicular [Axillary Lymph Nodes Enlarged Bilaterally] : axillary [Inguinal Lymph Nodes Enlarged Bilaterally] : inguinal [Involuntary Movements] : no involuntary movements were seen [] : no rash [No Focal Deficits] : no focal deficits [Oriented To Time, Place, And Person] : oriented to person, place, and time [Impaired Insight] : insight and judgment were intact [Affect] : the affect was normal [FreeTextEntry1] : PD catheter has been removed

## 2019-04-16 ENCOUNTER — APPOINTMENT (OUTPATIENT)
Dept: TRANSPLANT | Facility: CLINIC | Age: 74
End: 2019-04-16
Payer: MEDICARE

## 2019-04-16 VITALS
WEIGHT: 246 LBS | HEART RATE: 73 BPM | BODY MASS INDEX: 34.31 KG/M2 | TEMPERATURE: 97.9 F | RESPIRATION RATE: 16 BRPM | OXYGEN SATURATION: 97 % | SYSTOLIC BLOOD PRESSURE: 139 MMHG | DIASTOLIC BLOOD PRESSURE: 77 MMHG

## 2019-04-16 LAB
ALBUMIN SERPL ELPH-MCNC: 4.5 G/DL
ALP BLD-CCNC: 87 U/L
ALT SERPL-CCNC: 17 U/L
ANION GAP SERPL CALC-SCNC: 12 MMOL/L
APPEARANCE: CLEAR
AST SERPL-CCNC: 19 U/L
BACTERIA: NEGATIVE
BASOPHILS # BLD AUTO: 0.08 K/UL
BASOPHILS NFR BLD AUTO: 1.1 %
BILIRUB SERPL-MCNC: 0.2 MG/DL
BILIRUBIN URINE: NEGATIVE
BLOOD URINE: NEGATIVE
BUN SERPL-MCNC: 23 MG/DL
CALCIUM SERPL-MCNC: 9 MG/DL
CHLORIDE SERPL-SCNC: 108 MMOL/L
CO2 SERPL-SCNC: 22 MMOL/L
COLOR: YELLOW
CREAT SERPL-MCNC: 1.19 MG/DL
CREAT SPEC-SCNC: 86 MG/DL
CREAT/PROT UR: 0.2 RATIO
EOSINOPHIL # BLD AUTO: 0.11 K/UL
EOSINOPHIL NFR BLD AUTO: 1.5 %
GLUCOSE QUALITATIVE U: NEGATIVE
GLUCOSE SERPL-MCNC: 163 MG/DL
HCT VFR BLD CALC: 37.3 %
HGB BLD-MCNC: 11.1 G/DL
HYALINE CASTS: 0 /LPF
IMM GRANULOCYTES NFR BLD AUTO: 1 %
KETONES URINE: NEGATIVE
LDH SERPL-CCNC: 202 U/L
LEUKOCYTE ESTERASE URINE: NEGATIVE
LYMPHOCYTES # BLD AUTO: 0.96 K/UL
LYMPHOCYTES NFR BLD AUTO: 13.5 %
MAGNESIUM SERPL-MCNC: 2 MG/DL
MAN DIFF?: NORMAL
MCHC RBC-ENTMCNC: 29.8 GM/DL
MCHC RBC-ENTMCNC: 32.1 PG
MCV RBC AUTO: 107.8 FL
MICROSCOPIC-UA: NORMAL
MONOCYTES # BLD AUTO: 0.43 K/UL
MONOCYTES NFR BLD AUTO: 6 %
NEUTROPHILS # BLD AUTO: 5.46 K/UL
NEUTROPHILS NFR BLD AUTO: 76.9 %
NITRITE URINE: NEGATIVE
PH URINE: 6
PHOSPHATE SERPL-MCNC: 2.2 MG/DL
PLATELET # BLD AUTO: 210 K/UL
POTASSIUM SERPL-SCNC: 5.5 MMOL/L
PROT SERPL-MCNC: 7.1 G/DL
PROT UR-MCNC: 18 MG/DL
PROTEIN URINE: NORMAL
RBC # BLD: 3.46 M/UL
RBC # FLD: 14.6 %
RED BLOOD CELLS URINE: 1 /HPF
SODIUM SERPL-SCNC: 142 MMOL/L
SPECIFIC GRAVITY URINE: 1.02
SQUAMOUS EPITHELIAL CELLS: 0 /HPF
TACROLIMUS SERPL-MCNC: 4.7 NG/ML
URATE SERPL-MCNC: 6.6 MG/DL
UROBILINOGEN URINE: NORMAL
WBC # FLD AUTO: 7.11 K/UL
WHITE BLOOD CELLS URINE: 1 /HPF

## 2019-04-16 PROCEDURE — 99214 OFFICE O/P EST MOD 30 MIN: CPT | Mod: 24

## 2019-04-16 RX ORDER — CLOTRIMAZOLE 10 MG/1
10 LOZENGE ORAL TWICE DAILY
Qty: 60 | Refills: 2 | Status: DISCONTINUED | COMMUNITY
Start: 2019-02-05 | End: 2019-04-16

## 2019-04-16 RX ORDER — MYCOPHENOLATE MOFETIL 500 MG/1
500 TABLET ORAL
Qty: 7 | Refills: 0 | Status: DISCONTINUED | COMMUNITY
Start: 2019-02-05 | End: 2019-04-16

## 2019-04-16 NOTE — PHYSICAL EXAM
[General Appearance - Alert] : alert [General Appearance - Well Nourished] : well nourished [General Appearance - Well Developed] : well developed [Exaggerated Use Of Accessory Muscles For Inspiration] : no accessory muscle use [Auscultation Breath Sounds / Voice Sounds] : lungs were clear to auscultation bilaterally [Heart Rate And Rhythm] : heart rate was normal and rhythm regular [Abdomen Soft] : soft [Abdomen Tenderness] : non-tender [Site: ___] : Site: [unfilled] [Clean] : clean [Dry] : dry [Healing Well] : healing well [No Edema] : no edema

## 2019-04-16 NOTE — ASSESSMENT
[FreeTextEntry1] : 72yo M sp DDRTx. \par \par 1) Graft - good function. Labs today\par \par 2) Immuno - tac goal 8-10, myfortic 360mg BID, pred 5mg\par \par 3) DM - on Lantus, Novolog.  Will d/c PM coverage. Followed by endo. \par \par 4) HTN - well controlled. Not on meds.\par \par 5) PPx - Valcyte qd, Bactrim, Clotrimazole\par \par 6) Diarrhea - dec to myfortic 360mg BID

## 2019-04-16 NOTE — REASON FOR VISIT
[de-identified] : 72yo M sp DDRTx on 2/1 with 60yo KDPI 81% ABELARDO graft (DONOR ID [id number], Match ID [id number]).\par \par INTERVAL EVENTS: \par Glucose has run low in middle of night. \par \par REVIEW OF SYSTEMS:\par The patient denies any fever, weight change. Denies any sore throat, ear pain, rhinorrhea. Denies any double vision, blurred vision or eye pain. Denies any shortness of breath or pleuritic chest pain. The patient denies any nausea, vomiting or diarrhea. The patient does not have any dysuria, frequency or urgency. He has no myalgia or joint pain\par

## 2019-04-17 LAB — BKV DNA SPEC QL NAA+PROBE: NOT DETECTED COPIES/ML

## 2019-04-18 ENCOUNTER — RX RENEWAL (OUTPATIENT)
Age: 74
End: 2019-04-18

## 2019-04-22 ENCOUNTER — APPOINTMENT (OUTPATIENT)
Dept: NEPHROLOGY | Facility: CLINIC | Age: 74
End: 2019-04-22

## 2019-04-25 ENCOUNTER — APPOINTMENT (OUTPATIENT)
Dept: NEPHROLOGY | Facility: CLINIC | Age: 74
End: 2019-04-25

## 2019-04-29 ENCOUNTER — APPOINTMENT (OUTPATIENT)
Dept: TRANSPLANT | Facility: CLINIC | Age: 74
End: 2019-04-29

## 2019-04-30 LAB
ALBUMIN SERPL ELPH-MCNC: 4.5 G/DL
ALP BLD-CCNC: 98 U/L
ALT SERPL-CCNC: 22 U/L
ANION GAP SERPL CALC-SCNC: 11 MMOL/L
APPEARANCE: CLEAR
AST SERPL-CCNC: 21 U/L
BACTERIA: NEGATIVE
BASOPHILS # BLD AUTO: 0.08 K/UL
BASOPHILS NFR BLD AUTO: 1.2 %
BILIRUB SERPL-MCNC: 0.2 MG/DL
BILIRUBIN URINE: NEGATIVE
BKV DNA SPEC QL NAA+PROBE: NOT DETECTED COPIES/ML
BLOOD URINE: NEGATIVE
BUN SERPL-MCNC: 27 MG/DL
CALCIUM SERPL-MCNC: 8.8 MG/DL
CHLORIDE SERPL-SCNC: 108 MMOL/L
CO2 SERPL-SCNC: 22 MMOL/L
COLOR: NORMAL
CREAT SERPL-MCNC: 1.31 MG/DL
CREAT SPEC-SCNC: 37 MG/DL
CREAT/PROT UR: 0.2 RATIO
EOSINOPHIL # BLD AUTO: 0.08 K/UL
EOSINOPHIL NFR BLD AUTO: 1.2 %
GLUCOSE QUALITATIVE U: NEGATIVE
GLUCOSE SERPL-MCNC: 162 MG/DL
HCT VFR BLD CALC: 37.6 %
HGB BLD-MCNC: 11.4 G/DL
HYALINE CASTS: 1 /LPF
IMM GRANULOCYTES NFR BLD AUTO: 0.4 %
KETONES URINE: NEGATIVE
LDH SERPL-CCNC: 210 U/L
LEUKOCYTE ESTERASE URINE: NEGATIVE
LYMPHOCYTES # BLD AUTO: 1.14 K/UL
LYMPHOCYTES NFR BLD AUTO: 16.7 %
MAGNESIUM SERPL-MCNC: 1.9 MG/DL
MAN DIFF?: NORMAL
MCHC RBC-ENTMCNC: 30.3 GM/DL
MCHC RBC-ENTMCNC: 32 PG
MCV RBC AUTO: 105.6 FL
MICROSCOPIC-UA: NORMAL
MONOCYTES # BLD AUTO: 0.38 K/UL
MONOCYTES NFR BLD AUTO: 5.6 %
NEUTROPHILS # BLD AUTO: 5.12 K/UL
NEUTROPHILS NFR BLD AUTO: 74.9 %
NITRITE URINE: NEGATIVE
PH URINE: 6
PHOSPHATE SERPL-MCNC: 3 MG/DL
PLATELET # BLD AUTO: 200 K/UL
POTASSIUM SERPL-SCNC: 5.3 MMOL/L
PROT SERPL-MCNC: 7.3 G/DL
PROT UR-MCNC: 6 MG/DL
PROTEIN URINE: NEGATIVE
RBC # BLD: 3.56 M/UL
RBC # FLD: 13.5 %
RED BLOOD CELLS URINE: 0 /HPF
SODIUM SERPL-SCNC: 141 MMOL/L
SPECIFIC GRAVITY URINE: 1.01
SQUAMOUS EPITHELIAL CELLS: 0 /HPF
TACROLIMUS SERPL-MCNC: 9.6 NG/ML
URATE SERPL-MCNC: 6.5 MG/DL
UROBILINOGEN URINE: NORMAL
WBC # FLD AUTO: 6.83 K/UL
WHITE BLOOD CELLS URINE: 1 /HPF

## 2019-05-01 ENCOUNTER — APPOINTMENT (OUTPATIENT)
Dept: TRANSPLANT | Facility: CLINIC | Age: 74
End: 2019-05-01

## 2019-05-06 ENCOUNTER — APPOINTMENT (OUTPATIENT)
Dept: NEPHROLOGY | Facility: CLINIC | Age: 74
End: 2019-05-06
Payer: MEDICARE

## 2019-05-06 VITALS
RESPIRATION RATE: 16 BRPM | TEMPERATURE: 98.4 F | WEIGHT: 255 LBS | BODY MASS INDEX: 35.57 KG/M2 | DIASTOLIC BLOOD PRESSURE: 80 MMHG | HEART RATE: 72 BPM | SYSTOLIC BLOOD PRESSURE: 140 MMHG

## 2019-05-06 PROCEDURE — 99214 OFFICE O/P EST MOD 30 MIN: CPT

## 2019-05-06 NOTE — PHYSICAL EXAM
[General Appearance - Alert] : alert [Sclera] : the sclera and conjunctiva were normal [General Appearance - In No Acute Distress] : in no acute distress [PERRL With Normal Accommodation] : pupils were equal in size, round, and reactive to light [Oropharynx] : the oropharynx was normal [Outer Ear] : the ears and nose were normal in appearance [Extraocular Movements] : extraocular movements were intact [Neck Cervical Mass (___cm)] : no neck mass was observed [Neck Appearance] : the appearance of the neck was normal [Jugular Venous Distention Increased] : there was no jugular-venous distention [Thyroid Diffuse Enlargement] : the thyroid was not enlarged [Thyroid Nodule] : there were no palpable thyroid nodules [Auscultation Breath Sounds / Voice Sounds] : lungs were clear to auscultation bilaterally [Heart Sounds] : normal S1 and S2 [Heart Rate And Rhythm] : heart rate was normal and rhythm regular [Heart Sounds Gallop] : no gallops [Murmurs] : no murmurs [Heart Sounds Pericardial Friction Rub] : no pericardial rub [Abdomen Soft] : soft [Edema] : there was no peripheral edema [Bowel Sounds] : normal bowel sounds [Abdomen Tenderness] : non-tender [Cervical Lymph Nodes Enlarged Posterior Bilaterally] : posterior cervical [Cervical Lymph Nodes Enlarged Anterior Bilaterally] : anterior cervical [Supraclavicular Lymph Nodes Enlarged Bilaterally] : supraclavicular [Axillary Lymph Nodes Enlarged Bilaterally] : axillary [Inguinal Lymph Nodes Enlarged Bilaterally] : inguinal [Involuntary Movements] : no involuntary movements were seen [FreeTextEntry1] : PD catheter has been removed [No Focal Deficits] : no focal deficits [] : no rash [Affect] : the affect was normal [Oriented To Time, Place, And Person] : oriented to person, place, and time [Impaired Insight] : insight and judgment were intact

## 2019-05-06 NOTE — ASSESSMENT
[FreeTextEntry1] : Renal Transplant recipient: Had immediate allograft function, normal creatinine at discharge. Has urinary frequency and nocturia. No dysuria/hematuria. No fever/chills. Tolerating medications.\par Immunosuppression: reviewed; Simulect induction, on tac/MMF/prednisone, last Tac level noted; will recheck. Currently on 4 mg twice daily.; 360 mg/dose MMF and prednisone at 5 mg daily\par DM: On Levemir 25 u   daily and NovoLog 5 units pre meal.  He was taking NovoLog 4 times daily, switched to 3 times daily premeal. Levemir he is taking it in the morning.\par Will continue to monitor and adjust treatment\par Hyperlipidemia: he was previously on Welchol had elevated LFT and joint/muscle pain with Lipitor. Will follow lipids\par Cardiovascular risk reduction discussed.  \par Peptic ulcer: On protonix\par Infection prophylaxis: On Bactrim, Valcyte and clotrimazole as well as GI prophylaxis.\par Discussed ambulation, using incentive spirometer, optimal glucose and blood pressure readings, adherence with medications and follow ups, follow up clinic visit schedule, avoiding dehydration, mosquito bites; prevention of DVT as well as food safety.\par He has met with transplant surgeon in the past; ureteral stent has been removed\par Had labs on April 16,Will check labs today.\par Discussed Renal Preservation strategies including achieving optimal weight through calory restriction and regular exercise, daily exercise, sleep hygiene, optimized control of glucose, blood pressure, lipids, avoidance of NSAIDs, Low Na and Low animal protein diet and medication adherence.\par \par

## 2019-05-06 NOTE — HISTORY OF PRESENT ILLNESS
[FreeTextEntry1] : 74 years old male, born in [place], [place]. \par He received  donor kidney transplant on 19 at [facility] (Surgeon-Srikanth Herbert)\par He was on peritoneal dialysis for 3 months and 1.5 years on hemodialysis prior to that prior to transplant. ESRD, kidney stones,  diabetic. No CAD/stents. Pre transplant was on insulin. Has hypothyroidism.\par Other pre transplant issues that need follow up: reviewed. 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 He started being diabetic after the stroid use.He became 735 Lbs, and had gastric bypass in  for weight loss. (Dr. Mckeon). Also had gastric ulcer surgery. Has left arm  AVF.\par H/o GODFREY in the past. Not on CPAP now.\par Donor characteristics reviewed.\par Donor:  Directed donor.  he is president of a senior club and one of the members is the  donor's neighbor.\par UNOS Donor ID: [id number]\par Match: [id number]\par OPO:  [place]\par Donor Age: 59\par ABO:  O\par KDPI: 81%\par COD: anoxia – drug intoxication\par Medical Hx: Asthma, Depression, drug use (cocaine), ETOH\par Terminal Scr ~2.8-3.0\par CMV- /EBV +\par Donor age-59\par Cold Ischemia time : noted\par CMV status: Donor negative\par Anatomy unremarkable\par Has ureteral stent. Has staples.\par PRA was 0%. HLA Match: Reviewed.\par Induction regimen noted. Simulect induction, Tac/MMF/Prednisone\par Currently taking Tacrolimus 1.5 mg am and 1.5 mg PM, cellcept 1 gm /dose and prednisone 5.\par He is on prophylaxis regimen: Bactrim, Valcyte, Clotrimazole\par Blood pressure control: On no meds\par Glucose control regimen: Levimir 25 units taking once daily AM and Novolog 5 u pre meal.\par Post op course: \par Delayed allograft function, had PD initially.\par Had ureteral stent and PD catheter removed on 3/14/19\par Had bleeding ulcer treated (GI Linda) and transfused 3 units, now on Protonix\par  \par Functional /Employment status: Reviewed. 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 Gagnon, [facility], previously Dr. Jones\par [facility]\par Endocrinologist: Gualberto Clark\par \par \par Update:\par \par Gaining weight slowly.\par Sleep: Not getting good sleep\par Appetite  ok.\par Overall feeling well.\par Ankle swelling has resolved.\par No diarrhea/constipation.\par \par Last labs reviewed: Cr 1.3, FK 9.6

## 2019-05-07 ENCOUNTER — OTHER (OUTPATIENT)
Age: 74
End: 2019-05-07

## 2019-05-07 LAB
ALBUMIN SERPL ELPH-MCNC: 4.1 G/DL
ALP BLD-CCNC: 93 U/L
ALT SERPL-CCNC: 24 U/L
ANION GAP SERPL CALC-SCNC: 13 MMOL/L
APPEARANCE: CLEAR
AST SERPL-CCNC: 19 U/L
BACTERIA: NEGATIVE
BASOPHILS # BLD AUTO: 0.06 K/UL
BASOPHILS NFR BLD AUTO: 0.9 %
BILIRUB SERPL-MCNC: 0.2 MG/DL
BILIRUBIN URINE: NEGATIVE
BLOOD URINE: NEGATIVE
BUN SERPL-MCNC: 22 MG/DL
CALCIUM SERPL-MCNC: 8.5 MG/DL
CHLORIDE SERPL-SCNC: 113 MMOL/L
CO2 SERPL-SCNC: 17 MMOL/L
COLOR: NORMAL
CREAT SERPL-MCNC: 1.19 MG/DL
CREAT SPEC-SCNC: 35 MG/DL
CREAT/PROT UR: 0.2 RATIO
EOSINOPHIL # BLD AUTO: 0.13 K/UL
EOSINOPHIL NFR BLD AUTO: 1.9 %
GLUCOSE QUALITATIVE U: NORMAL
GLUCOSE SERPL-MCNC: 205 MG/DL
HCT VFR BLD CALC: 35 %
HGB BLD-MCNC: 10.6 G/DL
HYALINE CASTS: 0 /LPF
IMM GRANULOCYTES NFR BLD AUTO: 1 %
KETONES URINE: NEGATIVE
LDH SERPL-CCNC: 205 U/L
LEUKOCYTE ESTERASE URINE: NEGATIVE
LYMPHOCYTES # BLD AUTO: 0.87 K/UL
LYMPHOCYTES NFR BLD AUTO: 12.4 %
MAGNESIUM SERPL-MCNC: 1.7 MG/DL
MAN DIFF?: NORMAL
MCHC RBC-ENTMCNC: 30.3 GM/DL
MCHC RBC-ENTMCNC: 31.6 PG
MCV RBC AUTO: 104.5 FL
MICROSCOPIC-UA: NORMAL
MONOCYTES # BLD AUTO: 0.43 K/UL
MONOCYTES NFR BLD AUTO: 6.1 %
NEUTROPHILS # BLD AUTO: 5.45 K/UL
NEUTROPHILS NFR BLD AUTO: 77.7 %
NITRITE URINE: NEGATIVE
PH URINE: 6
PHOSPHATE SERPL-MCNC: 2.4 MG/DL
PLATELET # BLD AUTO: 174 K/UL
POTASSIUM SERPL-SCNC: 5 MMOL/L
PROT SERPL-MCNC: 6.8 G/DL
PROT UR-MCNC: 7 MG/DL
PROTEIN URINE: NEGATIVE
RBC # BLD: 3.35 M/UL
RBC # FLD: 13.4 %
RED BLOOD CELLS URINE: 6 /HPF
SODIUM SERPL-SCNC: 143 MMOL/L
SPECIFIC GRAVITY URINE: 1.01
SQUAMOUS EPITHELIAL CELLS: 0 /HPF
TACROLIMUS SERPL-MCNC: 8.5 NG/ML
URATE SERPL-MCNC: 5.8 MG/DL
UROBILINOGEN URINE: NORMAL
WBC # FLD AUTO: 7.01 K/UL
WHITE BLOOD CELLS URINE: 1 /HPF

## 2019-05-09 LAB — BKV DNA SPEC QL NAA+PROBE: NOT DETECTED COPIES/ML

## 2019-05-15 ENCOUNTER — APPOINTMENT (OUTPATIENT)
Dept: TRANSPLANT | Facility: CLINIC | Age: 74
End: 2019-05-15

## 2019-05-20 ENCOUNTER — APPOINTMENT (OUTPATIENT)
Dept: NEPHROLOGY | Facility: CLINIC | Age: 74
End: 2019-05-20
Payer: MEDICARE

## 2019-05-20 VITALS
HEART RATE: 72 BPM | DIASTOLIC BLOOD PRESSURE: 80 MMHG | WEIGHT: 253.2 LBS | SYSTOLIC BLOOD PRESSURE: 140 MMHG | RESPIRATION RATE: 14 BRPM | BODY MASS INDEX: 35.31 KG/M2 | TEMPERATURE: 98.2 F

## 2019-05-20 PROCEDURE — 99214 OFFICE O/P EST MOD 30 MIN: CPT

## 2019-05-20 RX ORDER — DIBASIC SODIUM PHOSPHATE, MONOBASIC POTASSIUM PHOSPHATE AND MONOBASIC SODIUM PHOSPHATE 852; 155; 130 MG/1; MG/1; MG/1
155-852-130 TABLET ORAL
Qty: 60 | Refills: 1 | Status: DISCONTINUED | COMMUNITY
Start: 2019-04-16 | End: 2019-05-20

## 2019-05-20 NOTE — PHYSICAL EXAM
[General Appearance - Alert] : alert [General Appearance - In No Acute Distress] : in no acute distress [Sclera] : the sclera and conjunctiva were normal [PERRL With Normal Accommodation] : pupils were equal in size, round, and reactive to light [Extraocular Movements] : extraocular movements were intact [Outer Ear] : the ears and nose were normal in appearance [Oropharynx] : the oropharynx was normal [Neck Appearance] : the appearance of the neck was normal [Neck Cervical Mass (___cm)] : no neck mass was observed [Jugular Venous Distention Increased] : there was no jugular-venous distention [Thyroid Diffuse Enlargement] : the thyroid was not enlarged [Thyroid Nodule] : there were no palpable thyroid nodules [Auscultation Breath Sounds / Voice Sounds] : lungs were clear to auscultation bilaterally [Heart Rate And Rhythm] : heart rate was normal and rhythm regular [Heart Sounds Gallop] : no gallops [Heart Sounds] : normal S1 and S2 [Murmurs] : no murmurs [Heart Sounds Pericardial Friction Rub] : no pericardial rub [Edema] : there was no peripheral edema [Abdomen Tenderness] : non-tender [Bowel Sounds] : normal bowel sounds [Abdomen Soft] : soft [Cervical Lymph Nodes Enlarged Anterior Bilaterally] : anterior cervical [Cervical Lymph Nodes Enlarged Posterior Bilaterally] : posterior cervical [Inguinal Lymph Nodes Enlarged Bilaterally] : inguinal [Supraclavicular Lymph Nodes Enlarged Bilaterally] : supraclavicular [Axillary Lymph Nodes Enlarged Bilaterally] : axillary [] : no rash [Involuntary Movements] : no involuntary movements were seen [FreeTextEntry1] : PD catheter has been removed [Affect] : the affect was normal [No Focal Deficits] : no focal deficits [Impaired Insight] : insight and judgment were intact [Oriented To Time, Place, And Person] : oriented to person, place, and time

## 2019-05-20 NOTE — HISTORY OF PRESENT ILLNESS
[FreeTextEntry1] : 74 years old male, born in [place], [place]. \par He received  donor kidney transplant on 19 at [facility] (Surgeon-Srikanth Herbert)\par He was on peritoneal dialysis for 3 months and 1.5 years on hemodialysis prior to that prior to transplant. ESRD, kidney stones,  diabetic. No CAD/stents. Pre transplant was on insulin. Has hypothyroidism.\par Other pre transplant issues that need follow up: reviewed. 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 He started being diabetic after the stroid use.He became 735 Lbs, and had gastric bypass in  for weight loss. (Dr. Mckeon). Also had gastric ulcer surgery. Has left arm  AVF.\par H/o GODFREY in the past. Not on CPAP now.\par Donor characteristics reviewed.\par Donor:  Directed donor.  he is president of a senior club and one of the members is the  donor's neighbor.\par UNOS Donor ID: [id number]\par Match: [id number]\par OPO:  [place] on [place]\par Donor Age: 59\par ABO:  O\par KDPI: 81%\par COD: anoxia – drug intoxication\par Medical Hx: Asthma, Depression, drug use (cocaine), ETOH\par Terminal Scr ~2.8-3.0\par CMV- /EBV +\par Donor age-59\par Cold Ischemia time : noted\par CMV status: Donor negative\par Anatomy unremarkable\par Has ureteral stent. Has staples.\par PRA was 0%. HLA Match: Reviewed.\par Induction regimen noted. Simulect induction, Tac/MMF/Prednisone\par Currently taking Tacrolimus 1.5 mg am and 1.5 mg PM, cellcept 1 gm /dose and prednisone 5.\par He is on prophylaxis regimen: Bactrim, Valcyte, Clotrimazole\par Blood pressure control: On no meds\par Glucose control regimen: Levimir 25 units taking once daily AM and Novolog 5 u pre meal.\par Post op course: \par Delayed allograft function, had PD initially.\par Had ureteral stent and PD catheter removed on 3/14/19\par Had bleeding ulcer treated (GI Linda) and transfused 3 units, now on Protonix\par  \par Functional /Employment status: Reviewed. 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 Gagnon, [facility], previously Dr. Jones\par [facility]\par Endocrinologist: Gualberto Clark\par \par \par Update:\par \par Gaining weight slowly.\par Sleep: Not getting good sleep\par Appetite  ok.\par Overall feeling well.\par Ankle swelling has completely resolved.\par No diarrhea/constipation.\par \par Last labs reviewed: Cr 1.1, FK 8 K 5 Phos 2.4

## 2019-05-20 NOTE — ASSESSMENT
[FreeTextEntry1] : Renal Transplant recipient: Had immediate allograft function, normal creatinine at discharge. Has urinary frequency and nocturia. No dysuria/hematuria. No fever/chills. Tolerating medications.\par D/c phos supplements\par Immunosuppression: reviewed; Simulect induction, on tac/MMF/prednisone, last Tac level noted; will recheck. Currently on 4 mg twice daily.; 360 mg/dose MMF and prednisone at 5 mg daily\par DM: On Levemir 28 u   daily and NovoLog 7 units pre meal.  He was taking NovoLog 4 times daily, switched to 3 times daily premeal. Levemir he is taking it in the morning. AM glucose: 180\par He will f/u with endocrinologist.\par Will continue to monitor and adjust treatment\par Hyperlipidemia: he was previously on Welchol had elevated LFT and joint/muscle pain with Lipitor. Will follow lipids\par Cardiovascular risk reduction discussed.  \par Peptic ulcer: On protonix\par Infection prophylaxis: On Bactrim, Valcyte as well as GI prophylaxis.\par Discussed ambulation, using incentive spirometer, optimal glucose and blood pressure readings, adherence with medications and follow ups, follow up clinic visit schedule, avoiding dehydration, mosquito bites; prevention of DVT as well as food safety.\par He has met with transplant surgeon in the past; ureteral stent has been removed\par Discussed Renal Preservation strategies including achieving optimal weight through calory restriction and regular exercise, daily exercise, sleep hygiene, optimized control of glucose, blood pressure, lipids, avoidance of NSAIDs, Low Na and Low animal protein diet and medication adherence.\par \par

## 2019-05-21 ENCOUNTER — APPOINTMENT (OUTPATIENT)
Dept: TRANSPLANT | Facility: CLINIC | Age: 74
End: 2019-05-21

## 2019-05-21 LAB
ALBUMIN SERPL ELPH-MCNC: 4.4 G/DL
ALP BLD-CCNC: 103 U/L
ALT SERPL-CCNC: 18 U/L
ANION GAP SERPL CALC-SCNC: 11 MMOL/L
APPEARANCE: CLEAR
AST SERPL-CCNC: 21 U/L
BACTERIA: NEGATIVE
BASOPHILS # BLD AUTO: 0.07 K/UL
BASOPHILS NFR BLD AUTO: 1.2 %
BILIRUB SERPL-MCNC: 0.2 MG/DL
BILIRUBIN URINE: NEGATIVE
BKV DNA SPEC QL NAA+PROBE: NOT DETECTED COPIES/ML
BLOOD URINE: NEGATIVE
BUN SERPL-MCNC: 23 MG/DL
CALCIUM SERPL-MCNC: 9 MG/DL
CHLORIDE SERPL-SCNC: 109 MMOL/L
CO2 SERPL-SCNC: 20 MMOL/L
COLOR: COLORLESS
CREAT SERPL-MCNC: 1.29 MG/DL
CREAT SPEC-SCNC: 25 MG/DL
CREAT/PROT UR: 0.2 RATIO
EOSINOPHIL # BLD AUTO: 0.13 K/UL
EOSINOPHIL NFR BLD AUTO: 2.2 %
GLUCOSE QUALITATIVE U: NEGATIVE
GLUCOSE SERPL-MCNC: 97 MG/DL
HBV DNA # SERPL NAA+PROBE: NOT DETECTED IU/ML
HBV SURFACE AG SER QL: NONREACTIVE
HCT VFR BLD CALC: 38.2 %
HCV AB SER QL: NONREACTIVE
HCV S/CO RATIO: 0.11 S/CO
HEPB DNA PCR LOG: NOT DETECTED LOGIU/ML
HGB BLD-MCNC: 11.5 G/DL
HIV1 RNA # SERPL NAA+PROBE: NORMAL
HIV1 RNA # SERPL NAA+PROBE: NORMAL COPIES/ML
HIV1+2 AB SPEC QL IA.RAPID: NONREACTIVE
HYALINE CASTS: 1 /LPF
IMM GRANULOCYTES NFR BLD AUTO: 1.7 %
KETONES URINE: NEGATIVE
LDH SERPL-CCNC: 275 U/L
LEUKOCYTE ESTERASE URINE: NEGATIVE
LYMPHOCYTES # BLD AUTO: 1.1 K/UL
LYMPHOCYTES NFR BLD AUTO: 18.9 %
MAGNESIUM SERPL-MCNC: 2 MG/DL
MAN DIFF?: NORMAL
MCHC RBC-ENTMCNC: 30.1 GM/DL
MCHC RBC-ENTMCNC: 31.2 PG
MCV RBC AUTO: 103.5 FL
MICROSCOPIC-UA: NORMAL
MONOCYTES # BLD AUTO: 0.4 K/UL
MONOCYTES NFR BLD AUTO: 6.9 %
NEUTROPHILS # BLD AUTO: 4.03 K/UL
NEUTROPHILS NFR BLD AUTO: 69.1 %
NITRITE URINE: NEGATIVE
PH URINE: 6
PHOSPHATE SERPL-MCNC: 2.1 MG/DL
PLATELET # BLD AUTO: 208 K/UL
POTASSIUM SERPL-SCNC: 4.9 MMOL/L
PROT SERPL-MCNC: 7.3 G/DL
PROT UR-MCNC: 6 MG/DL
PROTEIN URINE: NEGATIVE
RBC # BLD: 3.69 M/UL
RBC # FLD: 12.9 %
RED BLOOD CELLS URINE: 1 /HPF
SODIUM SERPL-SCNC: 140 MMOL/L
SPECIFIC GRAVITY URINE: 1.01
SQUAMOUS EPITHELIAL CELLS: 0 /HPF
TACROLIMUS SERPL-MCNC: 8.8 NG/ML
URATE SERPL-MCNC: 6.6 MG/DL
UROBILINOGEN URINE: NORMAL
VIRAL LOAD INTERP: NORMAL
VIRAL LOAD LOG: NORMAL LG COP/ML
WBC # FLD AUTO: 5.83 K/UL
WHITE BLOOD CELLS URINE: 0 /HPF

## 2019-05-22 LAB
HCV RNA SERPL NAA DL=5-ACNC: NOT DETECTED
HCV RNA SERPL NAA+PROBE-LOG IU: NOT DETECTED LOGIU/ML

## 2019-06-10 ENCOUNTER — APPOINTMENT (OUTPATIENT)
Dept: NEPHROLOGY | Facility: CLINIC | Age: 74
End: 2019-06-10

## 2019-06-11 ENCOUNTER — LABORATORY RESULT (OUTPATIENT)
Age: 74
End: 2019-06-11

## 2019-06-11 ENCOUNTER — APPOINTMENT (OUTPATIENT)
Dept: TRANSPLANT | Facility: CLINIC | Age: 74
End: 2019-06-11
Payer: MEDICARE

## 2019-06-11 VITALS
HEART RATE: 77 BPM | BODY MASS INDEX: 35 KG/M2 | DIASTOLIC BLOOD PRESSURE: 84 MMHG | HEIGHT: 71 IN | SYSTOLIC BLOOD PRESSURE: 168 MMHG | RESPIRATION RATE: 14 BRPM | TEMPERATURE: 97.9 F | WEIGHT: 250 LBS | OXYGEN SATURATION: 92 %

## 2019-06-11 LAB
ALBUMIN SERPL ELPH-MCNC: 4.3 G/DL
ALP BLD-CCNC: 110 U/L
ALT SERPL-CCNC: 18 U/L
ANION GAP SERPL CALC-SCNC: 11 MMOL/L
APPEARANCE: CLEAR
AST SERPL-CCNC: 21 U/L
BACTERIA: NEGATIVE
BASOPHILS # BLD AUTO: 0.12 K/UL
BASOPHILS NFR BLD AUTO: 2.6 %
BILIRUB SERPL-MCNC: 0.2 MG/DL
BILIRUBIN URINE: NEGATIVE
BLOOD URINE: NEGATIVE
BUN SERPL-MCNC: 31 MG/DL
CALCIUM SERPL-MCNC: 8.6 MG/DL
CHLORIDE SERPL-SCNC: 113 MMOL/L
CO2 SERPL-SCNC: 19 MMOL/L
COLOR: NORMAL
CREAT SERPL-MCNC: 1.62 MG/DL
CREAT SPEC-SCNC: 50 MG/DL
CREAT/PROT UR: 0.2 RATIO
EOSINOPHIL # BLD AUTO: 0.04 K/UL
EOSINOPHIL NFR BLD AUTO: 0.9 %
GLUCOSE QUALITATIVE U: NEGATIVE
GLUCOSE SERPL-MCNC: 177 MG/DL
HCT VFR BLD CALC: 38.3 %
HGB BLD-MCNC: 11.7 G/DL
HYALINE CASTS: 1 /LPF
KETONES URINE: NEGATIVE
LDH SERPL-CCNC: 326 U/L
LEUKOCYTE ESTERASE URINE: NEGATIVE
LYMPHOCYTES # BLD AUTO: 0.52 K/UL
LYMPHOCYTES NFR BLD AUTO: 11.3 %
MAGNESIUM SERPL-MCNC: 2 MG/DL
MAN DIFF?: NORMAL
MCHC RBC-ENTMCNC: 30.5 GM/DL
MCHC RBC-ENTMCNC: 31.1 PG
MCV RBC AUTO: 101.9 FL
MICROSCOPIC-UA: NORMAL
MONOCYTES # BLD AUTO: 0.16 K/UL
MONOCYTES NFR BLD AUTO: 3.5 %
NEUTROPHILS # BLD AUTO: 3.75 K/UL
NEUTROPHILS NFR BLD AUTO: 80.8 %
NITRITE URINE: NEGATIVE
PH URINE: 6
PHOSPHATE SERPL-MCNC: 2.8 MG/DL
PLATELET # BLD AUTO: 181 K/UL
POTASSIUM SERPL-SCNC: 5.4 MMOL/L
PROT SERPL-MCNC: 7.1 G/DL
PROT UR-MCNC: 10 MG/DL
PROTEIN URINE: NORMAL
RBC # BLD: 3.76 M/UL
RBC # FLD: 12.8 %
RED BLOOD CELLS URINE: 1 /HPF
SODIUM SERPL-SCNC: 143 MMOL/L
SPECIFIC GRAVITY URINE: 1.01
SQUAMOUS EPITHELIAL CELLS: 1 /HPF
TACROLIMUS SERPL-MCNC: 11.5 NG/ML
URATE SERPL-MCNC: 6.7 MG/DL
UROBILINOGEN URINE: NORMAL
WBC # FLD AUTO: 4.59 K/UL
WHITE BLOOD CELLS URINE: 1 /HPF

## 2019-06-11 PROCEDURE — 99214 OFFICE O/P EST MOD 30 MIN: CPT

## 2019-06-11 RX ORDER — NYSTATIN 100000 [USP'U]/ML
100000 SUSPENSION ORAL 4 TIMES DAILY
Qty: 600 | Refills: 2 | Status: DISCONTINUED | COMMUNITY
Start: 2019-04-16 | End: 2019-06-11

## 2019-06-11 RX ORDER — INSULIN ASPART 100 [IU]/ML
100 INJECTION, SOLUTION INTRAVENOUS; SUBCUTANEOUS
Refills: 0 | Status: ACTIVE | COMMUNITY
Start: 2019-02-06

## 2019-06-11 NOTE — PHYSICAL EXAM
[General Appearance - Well Developed] : well developed [General Appearance - Well Nourished] : well nourished [General Appearance - Alert] : alert [Auscultation Breath Sounds / Voice Sounds] : lungs were clear to auscultation bilaterally [Heart Rate And Rhythm] : heart rate was normal and rhythm regular [Heart Sounds Pericardial Friction Rub] : no pericardial rub [Murmurs] : no murmurs [Abdomen Soft] : soft [FreeTextEntry1] : well healed [Site: ___] : Site: [unfilled] [Abdomen Tenderness] : non-tender [___ +] : left ankle [unfilled]U+ pitting edema

## 2019-06-11 NOTE — REASON FOR VISIT
[de-identified] : 74yo M sp DDRTx on 2/1 with 58yo KDPI 81% ABELARDO graft (DONOR ID [id number], Match ID [id number]).\par \par INTERVAL EVENTS: \par Having hyperglycemic episodes, has appt with endocrinologist this week.  .  No issues with meds.  Urinating frequently, no dysuria.  Fell and sprained ankle 1mos ago, still has swelling.\par \par REVIEW OF SYSTEMS:\par The patient denies any fever, weight change. Denies any sore throat, ear pain, rhinorrhea. Denies any double vision, blurred vision or eye pain. Denies any shortness of breath or pleuritic chest pain. The patient denies any nausea, vomiting or diarrhea. The patient does not have any dysuria, frequency or urgency. He has no myalgia or joint pain\par \par

## 2019-06-11 NOTE — ASSESSMENT
[FreeTextEntry1] : 72yo M sp DDRTx. \par \par 1) Graft - good function. Labs today\par \par 2) Immuno - tac goal 8-10, myfortic 720mg BID, pred 5mg\par \par 3) DM - hyperglcyemic.  Appt with Dr. Clark ([facility]) this week. \par \par 4) HTN - well controlled. Not on meds.\par \par 5) PPx - Valcyte qd, Bactrim\par \par

## 2019-06-12 LAB
BKV DNA SPEC QL NAA+PROBE: NOT DETECTED COPIES/ML
CMV DNA SPEC QL NAA+PROBE: NOT DETECTED
CMVPCR LOG: NOT DETECTED LOGIU/ML

## 2019-06-18 ENCOUNTER — APPOINTMENT (OUTPATIENT)
Dept: TRANSPLANT | Facility: CLINIC | Age: 74
End: 2019-06-18

## 2019-06-18 ENCOUNTER — LABORATORY RESULT (OUTPATIENT)
Age: 74
End: 2019-06-18

## 2019-06-26 ENCOUNTER — LABORATORY RESULT (OUTPATIENT)
Age: 74
End: 2019-06-26

## 2019-06-27 LAB
APPEARANCE: CLEAR
BACTERIA: NEGATIVE
BASOPHILS # BLD AUTO: 0.09 K/UL
BASOPHILS NFR BLD AUTO: 1.7 %
BILIRUBIN URINE: NEGATIVE
BKV DNA SPEC QL NAA+PROBE: NOT DETECTED COPIES/ML
BLOOD URINE: NEGATIVE
COLOR: NORMAL
CREAT SPEC-SCNC: 48 MG/DL
CREAT/PROT UR: 0.2 RATIO
EOSINOPHIL # BLD AUTO: 0.09 K/UL
EOSINOPHIL NFR BLD AUTO: 1.7 %
GLUCOSE QUALITATIVE U: NEGATIVE
HCT VFR BLD CALC: 40.3 %
HGB BLD-MCNC: 12 G/DL
HYALINE CASTS: 2 /LPF
KETONES URINE: NEGATIVE
LDH SERPL-CCNC: 301 U/L
LEUKOCYTE ESTERASE URINE: NEGATIVE
LYMPHOCYTES # BLD AUTO: 0.5 K/UL
LYMPHOCYTES NFR BLD AUTO: 9.6 %
MAGNESIUM SERPL-MCNC: 2.3 MG/DL
MAN DIFF?: NORMAL
MCHC RBC-ENTMCNC: 29.8 GM/DL
MCHC RBC-ENTMCNC: 30.5 PG
MCV RBC AUTO: 102.5 FL
MICROSCOPIC-UA: NORMAL
MONOCYTES # BLD AUTO: 0.46 K/UL
MONOCYTES NFR BLD AUTO: 8.7 %
NEUTROPHILS # BLD AUTO: 3.93 K/UL
NEUTROPHILS NFR BLD AUTO: 74.8 %
NITRITE URINE: NEGATIVE
PH URINE: 6
PHOSPHATE SERPL-MCNC: 2.8 MG/DL
PLATELET # BLD AUTO: 213 K/UL
PROT UR-MCNC: 11 MG/DL
PROTEIN URINE: NEGATIVE
RBC # BLD: 3.93 M/UL
RBC # FLD: 13.1 %
RED BLOOD CELLS URINE: 3 /HPF
SPECIFIC GRAVITY URINE: 1.02
SQUAMOUS EPITHELIAL CELLS: 1 /HPF
TACROLIMUS SERPL-MCNC: 12 NG/ML
URATE SERPL-MCNC: 6.8 MG/DL
UROBILINOGEN URINE: NORMAL
WBC # FLD AUTO: 5.25 K/UL
WHITE BLOOD CELLS URINE: 1 /HPF

## 2019-07-09 ENCOUNTER — APPOINTMENT (OUTPATIENT)
Dept: TRANSPLANT | Facility: CLINIC | Age: 74
End: 2019-07-09

## 2019-07-10 ENCOUNTER — RESULT CHARGE (OUTPATIENT)
Age: 74
End: 2019-07-10

## 2019-07-11 ENCOUNTER — APPOINTMENT (OUTPATIENT)
Dept: NEPHROLOGY | Facility: CLINIC | Age: 74
End: 2019-07-11

## 2019-07-11 LAB
ALBUMIN SERPL ELPH-MCNC: 4.4 G/DL
ALP BLD-CCNC: 91 U/L
ALT SERPL-CCNC: 21 U/L
ANION GAP SERPL CALC-SCNC: 13 MMOL/L
APPEARANCE: CLEAR
AST SERPL-CCNC: 23 U/L
BACTERIA: NEGATIVE
BASOPHILS # BLD AUTO: 0.08 K/UL
BASOPHILS NFR BLD AUTO: 1.3 %
BILIRUB SERPL-MCNC: 0.4 MG/DL
BILIRUBIN URINE: NEGATIVE
BKV DNA SPEC QL NAA+PROBE: NOT DETECTED COPIES/ML
BLOOD URINE: NEGATIVE
BUN SERPL-MCNC: 36 MG/DL
CALCIUM SERPL-MCNC: 8.7 MG/DL
CHLORIDE SERPL-SCNC: 111 MMOL/L
CO2 SERPL-SCNC: 22 MMOL/L
COLOR: COLORLESS
CREAT SERPL-MCNC: 1.45 MG/DL
CREAT SPEC-SCNC: 25 MG/DL
CREAT/PROT UR: 0.2 RATIO
EOSINOPHIL # BLD AUTO: 0.19 K/UL
EOSINOPHIL NFR BLD AUTO: 3.1 %
GLUCOSE QUALITATIVE U: NEGATIVE
GLUCOSE SERPL-MCNC: 45 MG/DL
HCT VFR BLD CALC: 37.4 %
HGB BLD-MCNC: 11.4 G/DL
HYALINE CASTS: 0 /LPF
IMM GRANULOCYTES NFR BLD AUTO: 4.5 %
KETONES URINE: NEGATIVE
LDH SERPL-CCNC: 333 U/L
LEUKOCYTE ESTERASE URINE: NEGATIVE
LYMPHOCYTES # BLD AUTO: 1.13 K/UL
LYMPHOCYTES NFR BLD AUTO: 18.2 %
MAGNESIUM SERPL-MCNC: 1.9 MG/DL
MAN DIFF?: NORMAL
MCHC RBC-ENTMCNC: 30.5 GM/DL
MCHC RBC-ENTMCNC: 31.1 PG
MCV RBC AUTO: 102.2 FL
MICROSCOPIC-UA: NORMAL
MONOCYTES # BLD AUTO: 0.29 K/UL
MONOCYTES NFR BLD AUTO: 4.7 %
NEUTROPHILS # BLD AUTO: 4.25 K/UL
NEUTROPHILS NFR BLD AUTO: 68.2 %
NITRITE URINE: NEGATIVE
PH URINE: 6
PHOSPHATE SERPL-MCNC: 3.5 MG/DL
PLATELET # BLD AUTO: 182 K/UL
POTASSIUM SERPL-SCNC: 4.9 MMOL/L
PROT SERPL-MCNC: 7.3 G/DL
PROT UR-MCNC: 4 MG/DL
PROTEIN URINE: NEGATIVE
RBC # BLD: 3.66 M/UL
RBC # FLD: 13.7 %
RED BLOOD CELLS URINE: 1 /HPF
SODIUM SERPL-SCNC: 146 MMOL/L
SPECIFIC GRAVITY URINE: 1.01
SQUAMOUS EPITHELIAL CELLS: 1 /HPF
TACROLIMUS SERPL-MCNC: 4.3 NG/ML
URATE SERPL-MCNC: 6.8 MG/DL
UROBILINOGEN URINE: NORMAL
WBC # FLD AUTO: 6.22 K/UL
WHITE BLOOD CELLS URINE: 0 /HPF

## 2019-07-16 ENCOUNTER — APPOINTMENT (OUTPATIENT)
Dept: NEPHROLOGY | Facility: CLINIC | Age: 74
End: 2019-07-16
Payer: MEDICARE

## 2019-07-16 VITALS
WEIGHT: 249 LBS | BODY MASS INDEX: 34.86 KG/M2 | RESPIRATION RATE: 14 BRPM | DIASTOLIC BLOOD PRESSURE: 70 MMHG | OXYGEN SATURATION: 99 % | HEART RATE: 75 BPM | TEMPERATURE: 98 F | SYSTOLIC BLOOD PRESSURE: 146 MMHG | HEIGHT: 71 IN

## 2019-07-16 DIAGNOSIS — E03.9 HYPOTHYROIDISM, UNSPECIFIED: ICD-10-CM

## 2019-07-16 PROCEDURE — 99214 OFFICE O/P EST MOD 30 MIN: CPT

## 2019-07-16 RX ORDER — FERROUS SULFATE 325(65) MG
325 (65 FE) TABLET ORAL DAILY
Refills: 0 | Status: ACTIVE | COMMUNITY
Start: 2019-06-11

## 2019-07-16 NOTE — ASSESSMENT
[FreeTextEntry1] : Renal Transplant recipient: Had immediate allograft function,  Has urinary frequency and nocturia (twice). No dysuria/hematuria. No fever/chills. Tolerating medications.\par Immunosuppression: reviewed; Simulect induction, on tac/MMF/prednisone, last Tac level noted; will recheck. Currently on 2.5 mg twice daily.; 360 mg/dose MMF and prednisone at 5 mg daily\par DM: On Insulin.  He will f/u with endocrinologist. Gualberto Salazar\par Will continue to monitor and adjust treatment\par Hyperlipidemia: he was previously on Welchol had elevated LFT and joint/muscle pain with Lipitor. Will follow lipids\par Cardiovascular risk reduction discussed.  \par Peptic ulcer: On protonix\par Left knee arthritis: On f/u with orthopedics surgeon\par Infection prophylaxis: On Bactrim, Valcyte as well as GI prophylaxis.\par Discussed ambulation, using incentive spirometer, optimal glucose and blood pressure readings, adherence with medications and follow ups, follow up clinic visit schedule, avoiding dehydration, mosquito bites; prevention of DVT as well as food safety.\par He has met with transplant surgeon in the past; ureteral stent has been removed\par Discussed Renal Preservation strategies including achieving optimal weight through calory restriction and regular exercise, daily exercise, sleep hygiene, optimized control of glucose, blood pressure, lipids, avoidance of NSAIDs, Low Na and Low animal protein diet and medication adherence.\par \par

## 2019-07-16 NOTE — PHYSICAL EXAM
[General Appearance - Alert] : alert [General Appearance - In No Acute Distress] : in no acute distress [Sclera] : the sclera and conjunctiva were normal [PERRL With Normal Accommodation] : pupils were equal in size, round, and reactive to light [Extraocular Movements] : extraocular movements were intact [Outer Ear] : the ears and nose were normal in appearance [Oropharynx] : the oropharynx was normal [Neck Appearance] : the appearance of the neck was normal [Neck Cervical Mass (___cm)] : no neck mass was observed [Jugular Venous Distention Increased] : there was no jugular-venous distention [Thyroid Diffuse Enlargement] : the thyroid was not enlarged [Thyroid Nodule] : there were no palpable thyroid nodules [Auscultation Breath Sounds / Voice Sounds] : lungs were clear to auscultation bilaterally [Heart Rate And Rhythm] : heart rate was normal and rhythm regular [Heart Sounds] : normal S1 and S2 [Heart Sounds Gallop] : no gallops [Murmurs] : no murmurs [Heart Sounds Pericardial Friction Rub] : no pericardial rub [Bowel Sounds] : normal bowel sounds [Abdomen Soft] : soft [Abdomen Tenderness] : non-tender [Cervical Lymph Nodes Enlarged Posterior Bilaterally] : posterior cervical [Cervical Lymph Nodes Enlarged Anterior Bilaterally] : anterior cervical [Supraclavicular Lymph Nodes Enlarged Bilaterally] : supraclavicular [Axillary Lymph Nodes Enlarged Bilaterally] : axillary [Inguinal Lymph Nodes Enlarged Bilaterally] : inguinal [Involuntary Movements] : no involuntary movements were seen [] : no rash [No Focal Deficits] : no focal deficits [Oriented To Time, Place, And Person] : oriented to person, place, and time [Impaired Insight] : insight and judgment were intact [Affect] : the affect was normal [FreeTextEntry1] : PD catheter has been removed

## 2019-07-16 NOTE — HISTORY OF PRESENT ILLNESS
[FreeTextEntry1] : 74 years old male, born in [place], [place]. \par He received  donor kidney transplant on 19 at [facility] (Surgeon-Srikanth Herbert)\par He was on peritoneal dialysis for 3 months and 1.5 years on hemodialysis prior to that prior to transplant. ESRD, kidney stones,  diabetic. No CAD/stents. Pre transplant was on insulin. Has hypothyroidism.\par Other pre transplant issues that need follow up: reviewed. 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 He started being diabetic after the stroid use.He became 735 Lbs, and had gastric bypass in  for weight loss. (Dr. Mckeon). Also had gastric ulcer surgery. Has left arm  AVF.\par H/o GODFREY in the past. Not on CPAP now.\par \par Induction regimen noted. Simulect induction, Tac/MMF/Prednisone\par Currently taking Tacrolimus 2.5 mg am and 2.5 mg PM, myfortic 360 mg /dose and prednisone 5.\par He is on prophylaxis regimen: Bactrim, Valcyte, Clotrimazole\par Blood pressure control: On no meds\par Glucose control regimen: Toujeo 50 units taking once daily AM and Novolog 5 to 6 u pre meal.\par Post op course: \par Delayed allograft function, had PD initially.\par Had ureteral stent and PD catheter removed on 3/14/19\par Had bleeding ulcer treated (DAGOBERTO Mckeon) and transfused 3 units, now on Protonix\par  \par Functional /Employment status: Reviewed. Retired from [employer]. 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 Gagnon, [facility], previously Dr. Jones\par Dialysis Unit: [facility]\par Endocrinologist: Gualberto Clark\par \par \par Update:\par Was evaluated at [facility] in  for left LE edema and knee arthritis. received tapering steroids , now improving left knee pain (old trauma) Has seen orthopedic surgeon Dr. Amari Angeles and is considering knee replacement.\par Sleep: Not getting good sleep, on oxycontin for pain\par Appetite  ok.\par Overall feeling well.\par Ankle swelling has completely resolved.\par No diarrhea/constipation.\par \par Last labs reviewed

## 2019-07-17 LAB
ALBUMIN SERPL ELPH-MCNC: 4.7 G/DL
ALP BLD-CCNC: 103 U/L
ALT SERPL-CCNC: 18 U/L
ANION GAP SERPL CALC-SCNC: 16 MMOL/L
APPEARANCE: CLEAR
AST SERPL-CCNC: 40 U/L
BASOPHILS # BLD AUTO: 0.07 K/UL
BASOPHILS NFR BLD AUTO: 1.5 %
BILIRUB SERPL-MCNC: 0.3 MG/DL
BILIRUBIN URINE: NEGATIVE
BKV DNA SPEC QL NAA+PROBE: NOT DETECTED COPIES/ML
BLOOD URINE: NORMAL
BUN SERPL-MCNC: 38 MG/DL
CALCIUM SERPL-MCNC: 9 MG/DL
CHLORIDE SERPL-SCNC: 109 MMOL/L
CO2 SERPL-SCNC: 21 MMOL/L
COLOR: COLORLESS
CREAT SERPL-MCNC: 1.48 MG/DL
CREAT SPEC-SCNC: 22 MG/DL
CREAT/PROT UR: 0.2 RATIO
EOSINOPHIL # BLD AUTO: 0.13 K/UL
EOSINOPHIL NFR BLD AUTO: 2.7 %
GLUCOSE QUALITATIVE U: NEGATIVE
GLUCOSE SERPL-MCNC: 59 MG/DL
HBV SURFACE AG SER QL: NONREACTIVE
HCT VFR BLD CALC: 38.6 %
HCV AB SER QL: NONREACTIVE
HCV S/CO RATIO: 0.11 S/CO
HGB BLD-MCNC: 11.7 G/DL
HIV1 RNA # SERPL NAA+PROBE: NORMAL
HIV1 RNA # SERPL NAA+PROBE: NORMAL COPIES/ML
HIV1+2 AB SPEC QL IA.RAPID: NONREACTIVE
IMM GRANULOCYTES NFR BLD AUTO: 3.7 %
KETONES URINE: NEGATIVE
LDH SERPL-CCNC: 310 U/L
LEUKOCYTE ESTERASE URINE: NEGATIVE
LYMPHOCYTES # BLD AUTO: 1.4 K/UL
LYMPHOCYTES NFR BLD AUTO: 29.1 %
MAGNESIUM SERPL-MCNC: 2.1 MG/DL
MAN DIFF?: NORMAL
MCHC RBC-ENTMCNC: 30.3 GM/DL
MCHC RBC-ENTMCNC: 30.7 PG
MCV RBC AUTO: 101.3 FL
MONOCYTES # BLD AUTO: 0.3 K/UL
MONOCYTES NFR BLD AUTO: 6.2 %
NEUTROPHILS # BLD AUTO: 2.73 K/UL
NEUTROPHILS NFR BLD AUTO: 56.8 %
NITRITE URINE: NEGATIVE
PH URINE: 6
PHOSPHATE SERPL-MCNC: 2.7 MG/DL
PLATELET # BLD AUTO: 188 K/UL
POTASSIUM SERPL-SCNC: 4.7 MMOL/L
PROT SERPL-MCNC: 7.3 G/DL
PROT UR-MCNC: 4 MG/DL
PROTEIN URINE: NEGATIVE
RBC # BLD: 3.81 M/UL
RBC # FLD: 13.8 %
SODIUM SERPL-SCNC: 146 MMOL/L
SPECIFIC GRAVITY URINE: 1.01
TACROLIMUS SERPL-MCNC: 3.9 NG/ML
URATE SERPL-MCNC: 7.2 MG/DL
UROBILINOGEN URINE: NORMAL
VIRAL LOAD INTERP: NORMAL
VIRAL LOAD LOG: NORMAL LG COP/ML
WBC # FLD AUTO: 4.81 K/UL

## 2019-07-18 LAB
HBV DNA # SERPL NAA+PROBE: NOT DETECTED IU/ML
HCV RNA SERPL NAA DL=5-ACNC: NOT DETECTED IU/ML
HCV RNA SERPL NAA+PROBE-LOG IU: NOT DETECTED LOGIU/ML
HEPB DNA PCR LOG: NOT DETECTED LOGIU/ML

## 2019-07-23 ENCOUNTER — APPOINTMENT (OUTPATIENT)
Dept: TRANSPLANT | Facility: CLINIC | Age: 74
End: 2019-07-23

## 2019-07-23 NOTE — PATIENT PROFILE ADULT - LAST ORAL INTAKE

## 2019-08-02 PROBLEM — Z00.00 ENCOUNTER FOR PREVENTIVE HEALTH EXAMINATION: Status: ACTIVE | Noted: 2017-04-18

## 2019-08-05 ENCOUNTER — APPOINTMENT (OUTPATIENT)
Dept: TRANSPLANT | Facility: CLINIC | Age: 74
End: 2019-08-05

## 2019-08-05 ENCOUNTER — LABORATORY RESULT (OUTPATIENT)
Age: 74
End: 2019-08-05

## 2019-08-05 DIAGNOSIS — Z00.00 ENCOUNTER FOR GENERAL ADULT MEDICAL EXAMINATION W/OUT ABNORMAL FINDINGS: ICD-10-CM

## 2019-08-06 LAB
ALBUMIN SERPL ELPH-MCNC: 4.3 G/DL
ALP BLD-CCNC: 109 U/L
ALT SERPL-CCNC: 17 U/L
ANION GAP SERPL CALC-SCNC: 14 MMOL/L
APPEARANCE: CLEAR
AST SERPL-CCNC: 26 U/L
BACTERIA: NEGATIVE
BASOPHILS # BLD AUTO: 0.05 K/UL
BASOPHILS NFR BLD AUTO: 0.9 %
BILIRUB SERPL-MCNC: 0.4 MG/DL
BILIRUBIN URINE: NEGATIVE
BLOOD URINE: NORMAL
BUN SERPL-MCNC: 34 MG/DL
CALCIUM SERPL-MCNC: 8.6 MG/DL
CHLORIDE SERPL-SCNC: 109 MMOL/L
CMV DNA SPEC QL NAA+PROBE: NOT DETECTED
CMVPCR LOG: NOT DETECTED LOGIU/ML
CO2 SERPL-SCNC: 20 MMOL/L
COLOR: COLORLESS
CREAT SERPL-MCNC: 1.63 MG/DL
CREAT SPEC-SCNC: 21 MG/DL
CREAT/PROT UR: 0.2 RATIO
EOSINOPHIL # BLD AUTO: 0.11 K/UL
EOSINOPHIL NFR BLD AUTO: 1.8 %
GLUCOSE QUALITATIVE U: NEGATIVE
GLUCOSE SERPL-MCNC: 150 MG/DL
HBV DNA # SERPL NAA+PROBE: NOT DETECTED IU/ML
HBV SURFACE AG SER QL: NONREACTIVE
HCT VFR BLD CALC: 39.9 %
HCV AB SER QL: NONREACTIVE
HCV RNA SERPL NAA DL=5-ACNC: NOT DETECTED IU/ML
HCV RNA SERPL NAA+PROBE-LOG IU: NOT DETECTED LOGIU/ML
HCV S/CO RATIO: 0.12 S/CO
HEPB DNA PCR LOG: NOT DETECTED LOGIU/ML
HGB BLD-MCNC: 12.1 G/DL
HIV1 RNA # SERPL NAA+PROBE: NORMAL
HIV1 RNA # SERPL NAA+PROBE: NORMAL COPIES/ML
HIV1+2 AB SPEC QL IA.RAPID: NONREACTIVE
HYALINE CASTS: 1 /LPF
KETONES URINE: NEGATIVE
LDH SERPL-CCNC: 342 U/L
LEUKOCYTE ESTERASE URINE: NEGATIVE
LYMPHOCYTES # BLD AUTO: 1.33 K/UL
LYMPHOCYTES NFR BLD AUTO: 22.1 %
MAGNESIUM SERPL-MCNC: 2.2 MG/DL
MAN DIFF?: NORMAL
MCHC RBC-ENTMCNC: 30.3 GM/DL
MCHC RBC-ENTMCNC: 32 PG
MCV RBC AUTO: 105.6 FL
MICROSCOPIC-UA: NORMAL
MONOCYTES # BLD AUTO: 0.21 K/UL
MONOCYTES NFR BLD AUTO: 3.5 %
NEUTROPHILS # BLD AUTO: 4.31 K/UL
NEUTROPHILS NFR BLD AUTO: 71.7 %
NITRITE URINE: NEGATIVE
PH URINE: 6
PHOSPHATE SERPL-MCNC: 3.2 MG/DL
PLATELET # BLD AUTO: 195 K/UL
POTASSIUM SERPL-SCNC: 4.5 MMOL/L
PROT SERPL-MCNC: 7.2 G/DL
PROT UR-MCNC: 4 MG/DL
PROTEIN URINE: NEGATIVE
RBC # BLD: 3.78 M/UL
RBC # FLD: 14.9 %
RED BLOOD CELLS URINE: 3 /HPF
SODIUM SERPL-SCNC: 143 MMOL/L
SPECIFIC GRAVITY URINE: 1.01
SQUAMOUS EPITHELIAL CELLS: 0 /HPF
TACROLIMUS SERPL-MCNC: 10.8 NG/ML
URATE SERPL-MCNC: 8.4 MG/DL
UROBILINOGEN URINE: NORMAL
VIRAL LOAD INTERP: NORMAL
VIRAL LOAD LOG: NORMAL LG COP/ML
WBC # FLD AUTO: 6.01 K/UL
WHITE BLOOD CELLS URINE: 1 /HPF

## 2019-08-07 LAB — BKV DNA SPEC QL NAA+PROBE: NOT DETECTED COPIES/ML

## 2019-08-13 ENCOUNTER — APPOINTMENT (OUTPATIENT)
Dept: TRANSPLANT | Facility: CLINIC | Age: 74
End: 2019-08-13

## 2019-08-29 ENCOUNTER — APPOINTMENT (OUTPATIENT)
Dept: NEPHROLOGY | Facility: CLINIC | Age: 74
End: 2019-08-29

## 2019-09-05 ENCOUNTER — OTHER (OUTPATIENT)
Age: 74
End: 2019-09-05

## 2019-09-06 ENCOUNTER — OTHER (OUTPATIENT)
Age: 74
End: 2019-09-06

## 2019-09-06 LAB
25(OH)D3 SERPL-MCNC: 12.2 NG/ML
ALBUMIN SERPL ELPH-MCNC: 4.2 G/DL
ALP BLD-CCNC: 114 U/L
ALT SERPL-CCNC: 20 U/L
ANION GAP SERPL CALC-SCNC: 11 MMOL/L
APPEARANCE: CLEAR
AST SERPL-CCNC: 22 U/L
BASOPHILS # BLD AUTO: 0.06 K/UL
BASOPHILS NFR BLD AUTO: 0.9 %
BILIRUB SERPL-MCNC: 0.3 MG/DL
BILIRUBIN URINE: NEGATIVE
BKV DNA SPEC QL NAA+PROBE: NOT DETECTED COPIES/ML
BLOOD URINE: NEGATIVE
BUN SERPL-MCNC: 29 MG/DL
CALCIUM SERPL-MCNC: 8.9 MG/DL
CALCIUM SERPL-MCNC: 8.9 MG/DL
CHLORIDE SERPL-SCNC: 110 MMOL/L
CHOLEST SERPL-MCNC: 176 MG/DL
CHOLEST/HDLC SERPL: 3.9 RATIO
CMV DNA SPEC QL NAA+PROBE: NOT DETECTED
CMVPCR LOG: NOT DETECTED LOGIU/ML
CO2 SERPL-SCNC: 22 MMOL/L
COLOR: NORMAL
CREAT SERPL-MCNC: 1.45 MG/DL
CREAT SPEC-SCNC: 34 MG/DL
CREAT/PROT UR: 0.2 RATIO
EOSINOPHIL # BLD AUTO: 0.1 K/UL
EOSINOPHIL NFR BLD AUTO: 1.5 %
ESTIMATED AVERAGE GLUCOSE: 134 MG/DL
GLUCOSE QUALITATIVE U: NEGATIVE
GLUCOSE SERPL-MCNC: 122 MG/DL
HBA1C MFR BLD HPLC: 6.3 %
HCT VFR BLD CALC: 39.7 %
HDLC SERPL-MCNC: 45 MG/DL
HGB BLD-MCNC: 12.1 G/DL
IMM GRANULOCYTES NFR BLD AUTO: 1.7 %
KETONES URINE: NEGATIVE
LDH SERPL-CCNC: 265 U/L
LDLC SERPL CALC-MCNC: 103 MG/DL
LEUKOCYTE ESTERASE URINE: NEGATIVE
LYMPHOCYTES # BLD AUTO: 0.89 K/UL
LYMPHOCYTES NFR BLD AUTO: 13.5 %
MAGNESIUM SERPL-MCNC: 2 MG/DL
MAN DIFF?: NORMAL
MCHC RBC-ENTMCNC: 30.5 GM/DL
MCHC RBC-ENTMCNC: 31.6 PG
MCV RBC AUTO: 103.7 FL
MONOCYTES # BLD AUTO: 0.49 K/UL
MONOCYTES NFR BLD AUTO: 7.4 %
NEUTROPHILS # BLD AUTO: 4.93 K/UL
NEUTROPHILS NFR BLD AUTO: 75 %
NITRITE URINE: NEGATIVE
PARATHYROID HORMONE INTACT: 106 PG/ML
PH URINE: 6
PHOSPHATE SERPL-MCNC: 2.3 MG/DL
PLATELET # BLD AUTO: 191 K/UL
POTASSIUM SERPL-SCNC: 4.7 MMOL/L
PROT SERPL-MCNC: 7 G/DL
PROT UR-MCNC: 7 MG/DL
PROTEIN URINE: NEGATIVE
RBC # BLD: 3.83 M/UL
RBC # FLD: 13.7 %
SODIUM SERPL-SCNC: 143 MMOL/L
SPECIFIC GRAVITY URINE: 1.01
TACROLIMUS SERPL-MCNC: 21.4 NG/ML
TRIGL SERPL-MCNC: 141 MG/DL
URATE SERPL-MCNC: 7.6 MG/DL
UROBILINOGEN URINE: NORMAL
WBC # FLD AUTO: 6.58 K/UL

## 2019-09-25 ENCOUNTER — APPOINTMENT (OUTPATIENT)
Dept: TRANSPLANT | Facility: CLINIC | Age: 74
End: 2019-09-25

## 2019-09-25 ENCOUNTER — APPOINTMENT (OUTPATIENT)
Dept: NEPHROLOGY | Facility: CLINIC | Age: 74
End: 2019-09-25
Payer: MEDICARE

## 2019-09-25 VITALS
DIASTOLIC BLOOD PRESSURE: 84 MMHG | RESPIRATION RATE: 14 BRPM | WEIGHT: 260 LBS | SYSTOLIC BLOOD PRESSURE: 151 MMHG | TEMPERATURE: 98.2 F | HEIGHT: 71 IN | BODY MASS INDEX: 36.4 KG/M2 | OXYGEN SATURATION: 92 % | HEART RATE: 82 BPM

## 2019-09-25 PROCEDURE — 99214 OFFICE O/P EST MOD 30 MIN: CPT

## 2019-09-25 RX ORDER — VALGANCICLOVIR HYDROCHLORIDE 450 MG/1
450 TABLET ORAL
Qty: 30 | Refills: 5 | Status: DISCONTINUED | COMMUNITY
Start: 2019-02-05 | End: 2019-09-25

## 2019-09-25 NOTE — HISTORY OF PRESENT ILLNESS
[FreeTextEntry1] : 74 years old male, born in [place], [place]. \par He received  donor kidney transplant on 19 at [facility] (Surgeon-Srikanth Herbert)\par He was on peritoneal dialysis for 3 months and 1.5 years on hemodialysis prior to that . ESRD, kidney stones,  diabetic. No CAD/stents. Pre transplant was on insulin. Has hypothyroidism.\par Other pre transplant issues that need follow up: reviewed. 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 He started being diabetic after the stroid use.He became 735 Lbs, and had gastric bypass in  for weight loss. (Dr. Mckeon). Also had gastric ulcer surgery. Has left arm  AVF.\par H/o GODFREY in the past. Not on CPAP now.\par \par Induction regimen noted. Simulect induction, Tac/MMF/Prednisone\par Post op course: \par Delayed allograft function, had PD initially.\par Had ureteral stent and PD catheter removed on 3/14/19\par Had bleeding ulcer treated (GI Linda) and transfused 3 units, now on Protonix\par  \par Functional /Employment status: Reviewed. Retired from [employer]. 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 Gagnon, [facility], previously Dr. Jones\par Dialysis Unit: [facility]\par Endocrinologist: Gualberto Clark\par \par Update:\par No acute symptoms. received steroid shot in foot and ankle for arthritis. No open wounds\par No fever, no urinary symptoms\par reports fluctuating glucose levels with the steroid injections, currently well controlled.\par Has received Envarsus from pharmacy, has not switched. If level therapeutic with 3 twice daily will switch to 5 mg/d Envarsus.\par Has been having stress related his brother having cancer and memory issues.

## 2019-09-25 NOTE — ASSESSMENT
[FreeTextEntry1] : Renal Transplant recipient: Had immediate allograft function,  Has urinary frequency and nocturia (twice). No dysuria/hematuria. No fever/chills. Tolerating medications.\par Immunosuppression: reviewed; Simulect induction, on tac/MMF/prednisone, last Tac level noted; will recheck. Currently on 3 mg twice daily.; 360 mg/dose MMF and prednisone at 5 mg daily\par DM: On Insulin.  He will f/u with endocrinologist. Gualberto Salazar\par Will continue to monitor and adjust treatment\par Hyperlipidemia: he was previously on Welchol had elevated LFT and joint/muscle pain with Lipitor. Will follow lipids\par Cardiovascular risk reduction discussed.  \par Peptic ulcer: On protonix\par Left knee arthritis: On f/u with orthopedics surgeon, Dr. Mcclain at [place].\par Infection prophylaxis: On Bactrim, Valcyte as well as GI prophylaxis.\par Discussed ambulation, using incentive spirometer, optimal glucose and blood pressure readings, adherence with medications and follow ups, follow up clinic visit schedule, avoiding dehydration, mosquito bites; prevention of DVT as well as food safety.\par He has met with transplant surgeon in the past; ureteral stent has been removed\par Discussed again Renal Preservation strategies including achieving optimal weight through calory restriction and regular exercise, daily exercise, sleep hygiene, optimized control of glucose, blood pressure, lipids, avoidance of NSAIDs, Low Na and Low animal protein diet and medication adherence.\par \par

## 2019-09-25 NOTE — REASON FOR VISIT
[Follow-Up] : a follow-up visit [FreeTextEntry1] : Post transplant follow up, follow up after 2 months

## 2019-09-25 NOTE — PHYSICAL EXAM
[General Appearance - Alert] : alert [General Appearance - In No Acute Distress] : in no acute distress [Sclera] : the sclera and conjunctiva were normal [Extraocular Movements] : extraocular movements were intact [PERRL With Normal Accommodation] : pupils were equal in size, round, and reactive to light [Outer Ear] : the ears and nose were normal in appearance [Oropharynx] : the oropharynx was normal [Neck Appearance] : the appearance of the neck was normal [Jugular Venous Distention Increased] : there was no jugular-venous distention [Neck Cervical Mass (___cm)] : no neck mass was observed [Thyroid Nodule] : there were no palpable thyroid nodules [Thyroid Diffuse Enlargement] : the thyroid was not enlarged [Auscultation Breath Sounds / Voice Sounds] : lungs were clear to auscultation bilaterally [Heart Rate And Rhythm] : heart rate was normal and rhythm regular [Heart Sounds] : normal S1 and S2 [Heart Sounds Gallop] : no gallops [Murmurs] : no murmurs [Heart Sounds Pericardial Friction Rub] : no pericardial rub [Bowel Sounds] : normal bowel sounds [Abdomen Soft] : soft [Abdomen Tenderness] : non-tender [Cervical Lymph Nodes Enlarged Posterior Bilaterally] : posterior cervical [Supraclavicular Lymph Nodes Enlarged Bilaterally] : supraclavicular [Cervical Lymph Nodes Enlarged Anterior Bilaterally] : anterior cervical [Inguinal Lymph Nodes Enlarged Bilaterally] : inguinal [Axillary Lymph Nodes Enlarged Bilaterally] : axillary [Involuntary Movements] : no involuntary movements were seen [] : no rash [No Focal Deficits] : no focal deficits [Oriented To Time, Place, And Person] : oriented to person, place, and time [Impaired Insight] : insight and judgment were intact [Affect] : the affect was normal [FreeTextEntry1] : PD catheter has been removed

## 2019-10-07 RX ORDER — TACROLIMUS 1 MG/1
1 TABLET, EXTENDED RELEASE ORAL
Qty: 30 | Refills: 3 | Status: DISCONTINUED | COMMUNITY
Start: 2019-09-16 | End: 2019-10-07

## 2019-10-24 ENCOUNTER — APPOINTMENT (OUTPATIENT)
Dept: NEPHROLOGY | Facility: CLINIC | Age: 74
End: 2019-10-24

## 2019-10-25 ENCOUNTER — APPOINTMENT (OUTPATIENT)
Dept: TRANSPLANT | Facility: CLINIC | Age: 74
End: 2019-10-25

## 2019-10-28 ENCOUNTER — APPOINTMENT (OUTPATIENT)
Dept: NEPHROLOGY | Facility: CLINIC | Age: 74
End: 2019-10-28

## 2019-10-29 LAB
ALBUMIN SERPL ELPH-MCNC: 4.5 G/DL
ALP BLD-CCNC: 126 U/L
ALT SERPL-CCNC: 26 U/L
ANION GAP SERPL CALC-SCNC: 15 MMOL/L
APPEARANCE: CLEAR
AST SERPL-CCNC: 24 U/L
BACTERIA: NEGATIVE
BASOPHILS # BLD AUTO: 0.08 K/UL
BASOPHILS NFR BLD AUTO: 1.1 %
BILIRUB SERPL-MCNC: 0.2 MG/DL
BILIRUBIN URINE: NEGATIVE
BKV DNA SPEC QL NAA+PROBE: NOT DETECTED COPIES/ML
BLOOD URINE: NEGATIVE
BUN SERPL-MCNC: 34 MG/DL
CALCIUM SERPL-MCNC: 8.8 MG/DL
CHLORIDE SERPL-SCNC: 110 MMOL/L
CMV DNA SPEC QL NAA+PROBE: NOT DETECTED
CMVPCR LOG: NOT DETECTED LOGIU/ML
CO2 SERPL-SCNC: 21 MMOL/L
COLOR: NORMAL
CREAT SERPL-MCNC: 1.52 MG/DL
CREAT SPEC-SCNC: 30 MG/DL
CREAT/PROT UR: 0.2 RATIO
EOSINOPHIL # BLD AUTO: 0.19 K/UL
EOSINOPHIL NFR BLD AUTO: 2.6 %
GLUCOSE QUALITATIVE U: NEGATIVE
GLUCOSE SERPL-MCNC: 74 MG/DL
HCT VFR BLD CALC: 40.7 %
HGB BLD-MCNC: 12.4 G/DL
HYALINE CASTS: 0 /LPF
IMM GRANULOCYTES NFR BLD AUTO: 2.5 %
KETONES URINE: NEGATIVE
LDH SERPL-CCNC: 303 U/L
LEUKOCYTE ESTERASE URINE: NEGATIVE
LYMPHOCYTES # BLD AUTO: 1.21 K/UL
LYMPHOCYTES NFR BLD AUTO: 16.5 %
MAGNESIUM SERPL-MCNC: 1.9 MG/DL
MAN DIFF?: NORMAL
MCHC RBC-ENTMCNC: 30.5 GM/DL
MCHC RBC-ENTMCNC: 30.9 PG
MCV RBC AUTO: 101.5 FL
MICROSCOPIC-UA: NORMAL
MONOCYTES # BLD AUTO: 0.79 K/UL
MONOCYTES NFR BLD AUTO: 10.8 %
NEUTROPHILS # BLD AUTO: 4.89 K/UL
NEUTROPHILS NFR BLD AUTO: 66.5 %
NITRITE URINE: NEGATIVE
PH URINE: 6
PHOSPHATE SERPL-MCNC: 2.7 MG/DL
PLATELET # BLD AUTO: 226 K/UL
POTASSIUM SERPL-SCNC: 4.9 MMOL/L
PROT SERPL-MCNC: 7.1 G/DL
PROT UR-MCNC: 6 MG/DL
PROTEIN URINE: NEGATIVE
RBC # BLD: 4.01 M/UL
RBC # FLD: 12.9 %
RED BLOOD CELLS URINE: 1 /HPF
SODIUM SERPL-SCNC: 146 MMOL/L
SPECIFIC GRAVITY URINE: 1.01
SQUAMOUS EPITHELIAL CELLS: 0 /HPF
TACROLIMUS SERPL-MCNC: 14.1 NG/ML
URATE SERPL-MCNC: 8.1 MG/DL
UROBILINOGEN URINE: NORMAL
WBC # FLD AUTO: 7.34 K/UL
WHITE BLOOD CELLS URINE: 1 /HPF

## 2019-10-30 ENCOUNTER — CHART COPY (OUTPATIENT)
Age: 74
End: 2019-10-30

## 2019-10-30 RX ORDER — TACROLIMUS 1 MG/1
1 CAPSULE ORAL
Qty: 240 | Refills: 11 | Status: DISCONTINUED | COMMUNITY
Start: 2019-02-05 | End: 2019-10-30

## 2019-10-30 RX ORDER — TACROLIMUS 0.5 MG/1
0.5 CAPSULE ORAL
Qty: 360 | Refills: 11 | Status: DISCONTINUED | COMMUNITY
Start: 2019-09-06 | End: 2019-10-30

## 2019-11-14 LAB — TACROLIMUS SERPL-MCNC: 9.3 NG/ML

## 2019-11-25 ENCOUNTER — APPOINTMENT (OUTPATIENT)
Dept: NEPHROLOGY | Facility: CLINIC | Age: 74
End: 2019-11-25
Payer: MEDICARE

## 2019-11-25 VITALS
SYSTOLIC BLOOD PRESSURE: 138 MMHG | RESPIRATION RATE: 14 BRPM | HEIGHT: 71 IN | BODY MASS INDEX: 38.08 KG/M2 | HEART RATE: 103 BPM | OXYGEN SATURATION: 95 % | DIASTOLIC BLOOD PRESSURE: 70 MMHG | TEMPERATURE: 98 F | WEIGHT: 272 LBS

## 2019-11-25 LAB
ALBUMIN SERPL ELPH-MCNC: 4.5 G/DL
ALP BLD-CCNC: 113 U/L
ALT SERPL-CCNC: 18 U/L
ANION GAP SERPL CALC-SCNC: 12 MMOL/L
APPEARANCE: CLEAR
AST SERPL-CCNC: 18 U/L
BACTERIA: NEGATIVE
BASOPHILS # BLD AUTO: 0.09 K/UL
BASOPHILS NFR BLD AUTO: 1.1 %
BILIRUB SERPL-MCNC: 0.2 MG/DL
BILIRUBIN URINE: NEGATIVE
BKV DNA SPEC QL NAA+PROBE: NOT DETECTED COPIES/ML
BLOOD URINE: NEGATIVE
BUN SERPL-MCNC: 30 MG/DL
CALCIUM SERPL-MCNC: 8.8 MG/DL
CHLORIDE SERPL-SCNC: 111 MMOL/L
CMV DNA SPEC QL NAA+PROBE: NOT DETECTED
CMVPCR LOG: NOT DETECTED LOGIU/ML
CO2 SERPL-SCNC: 23 MMOL/L
COLOR: COLORLESS
CREAT SERPL-MCNC: 1.49 MG/DL
CREAT SPEC-SCNC: 23 MG/DL
CREAT/PROT UR: 0.2 RATIO
EOSINOPHIL # BLD AUTO: 0.23 K/UL
EOSINOPHIL NFR BLD AUTO: 2.9 %
GLUCOSE QUALITATIVE U: NEGATIVE
GLUCOSE SERPL-MCNC: 100 MG/DL
HCT VFR BLD CALC: 40.8 %
HGB BLD-MCNC: 12.4 G/DL
HYALINE CASTS: 1 /LPF
IMM GRANULOCYTES NFR BLD AUTO: 1.4 %
KETONES URINE: NEGATIVE
LDH SERPL-CCNC: 271 U/L
LEUKOCYTE ESTERASE URINE: NEGATIVE
LYMPHOCYTES # BLD AUTO: 1.53 K/UL
LYMPHOCYTES NFR BLD AUTO: 19 %
MAGNESIUM SERPL-MCNC: 1.8 MG/DL
MAN DIFF?: NORMAL
MCHC RBC-ENTMCNC: 30.4 GM/DL
MCHC RBC-ENTMCNC: 31.6 PG
MCV RBC AUTO: 103.8 FL
MICROSCOPIC-UA: NORMAL
MONOCYTES # BLD AUTO: 0.47 K/UL
MONOCYTES NFR BLD AUTO: 5.8 %
NEUTROPHILS # BLD AUTO: 5.61 K/UL
NEUTROPHILS NFR BLD AUTO: 69.8 %
NITRITE URINE: NEGATIVE
PH URINE: 6
PHOSPHATE SERPL-MCNC: 2.8 MG/DL
PLATELET # BLD AUTO: 205 K/UL
POTASSIUM SERPL-SCNC: 4.3 MMOL/L
PROT SERPL-MCNC: 7 G/DL
PROT UR-MCNC: 4 MG/DL
PROTEIN URINE: NEGATIVE
RBC # BLD: 3.93 M/UL
RBC # FLD: 13.2 %
RED BLOOD CELLS URINE: 1 /HPF
SODIUM SERPL-SCNC: 146 MMOL/L
SPECIFIC GRAVITY URINE: 1.01
SQUAMOUS EPITHELIAL CELLS: 0 /HPF
TACROLIMUS SERPL-MCNC: 6.6 NG/ML
UROBILINOGEN URINE: NORMAL
WBC # FLD AUTO: 8.04 K/UL
WHITE BLOOD CELLS URINE: 0 /HPF

## 2019-11-25 PROCEDURE — 99214 OFFICE O/P EST MOD 30 MIN: CPT

## 2019-11-25 NOTE — PHYSICAL EXAM
[General Appearance - Alert] : alert [General Appearance - In No Acute Distress] : in no acute distress [Sclera] : the sclera and conjunctiva were normal [PERRL With Normal Accommodation] : pupils were equal in size, round, and reactive to light [Outer Ear] : the ears and nose were normal in appearance [Extraocular Movements] : extraocular movements were intact [Neck Appearance] : the appearance of the neck was normal [Oropharynx] : the oropharynx was normal [Thyroid Diffuse Enlargement] : the thyroid was not enlarged [Jugular Venous Distention Increased] : there was no jugular-venous distention [Neck Cervical Mass (___cm)] : no neck mass was observed [Thyroid Nodule] : there were no palpable thyroid nodules [Auscultation Breath Sounds / Voice Sounds] : lungs were clear to auscultation bilaterally [Heart Rate And Rhythm] : heart rate was normal and rhythm regular [Heart Sounds] : normal S1 and S2 [Heart Sounds Gallop] : no gallops [Murmurs] : no murmurs [Heart Sounds Pericardial Friction Rub] : no pericardial rub [Abdomen Soft] : soft [Bowel Sounds] : normal bowel sounds [Abdomen Tenderness] : non-tender [Cervical Lymph Nodes Enlarged Anterior Bilaterally] : anterior cervical [Cervical Lymph Nodes Enlarged Posterior Bilaterally] : posterior cervical [Supraclavicular Lymph Nodes Enlarged Bilaterally] : supraclavicular [Inguinal Lymph Nodes Enlarged Bilaterally] : inguinal [Axillary Lymph Nodes Enlarged Bilaterally] : axillary [Involuntary Movements] : no involuntary movements were seen [No Focal Deficits] : no focal deficits [] : no rash [Oriented To Time, Place, And Person] : oriented to person, place, and time [Impaired Insight] : insight and judgment were intact [Affect] : the affect was normal [FreeTextEntry1] : PD catheter has been removed

## 2019-11-25 NOTE — ASSESSMENT
[FreeTextEntry1] : Renal Transplant recipient: Had immediate allograft function,  Has urinary frequency and nocturia (twice). No dysuria/hematuria. No fever/chills. Tolerating medications.\par Immunosuppression: reviewed; Simulect induction, on tac/MMF/prednisone, last Tac level noted; will recheck Tac level tomorrow. He took meds prior to labs today by mistake; 360 mg/dose MMF and prednisone at 5 mg daily\par DM: On Insulin.  He will f/u with endocrinologist. Gualberto Salazar\par Will continue to monitor and adjust treatment\par Hyperlipidemia: he was previously on Welchol had elevated LFT and joint/muscle pain with Lipitor. Will follow lipids\par Cardiovascular risk reduction discussed.  \par Peptic ulcer: On protonix\par Left knee arthritis: On f/u with orthopedics surgeon, Dr. Mcclain at [place].\par Infection prophylaxis: On Bactrim, Valcyte as well as GI prophylaxis.\par Weight gain: Discussed low calory diet and exercise\par Discussed again Renal Preservation strategies including achieving optimal weight through calory restriction and regular exercise, daily exercise, sleep hygiene, optimized control of glucose, blood pressure, lipids, avoidance of NSAIDs, Low Na and Low animal protein diet and medication adherence.\par \par

## 2019-11-25 NOTE — REASON FOR VISIT
[Follow-Up] : a follow-up visit [FreeTextEntry1] : Post transplant follow up, wearing removable boots on left foot for stress fractures

## 2019-11-25 NOTE — HISTORY OF PRESENT ILLNESS
[FreeTextEntry1] : 74 years old male, born in [place], [place]. \par He received  donor kidney transplant on 19 at [facility] (Surgeon-Srikanth Herbert)\par He was on peritoneal dialysis for 3 months and 1.5 years on hemodialysis prior to that . ESRD, kidney stones,  diabetic. No CAD/stents. Pre transplant was on insulin. Has hypothyroidism.\par Other pre transplant issues that need follow up: reviewed. 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 He started being diabetic after the stroid use.He became 735 Lbs, and had gastric bypass in  for weight loss. (Dr. Mckeon). Also had gastric ulcer surgery. Has left arm  AVF.\par H/o GODFREY in the past. Not on CPAP now.\par \par Induction regimen noted. Simulect induction, Tac/MMF/Prednisone\par Post op course: \par Delayed allograft function, had PD initially.\par Had ureteral stent and PD catheter removed on 3/14/19\par Had bleeding ulcer treated (GI [facility]) and transfused 3 units, now on Protonix\par  \par Functional /Employment status: Reviewed. Retired from [employer]. 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 Gagnon, [facility], previously Dr. Jones\par Dialysis Unit: [facility]\par Endocrinologist: Gualberto Clark\par \par Update:\par He has bilateral foot pains,  wearing removable boots on left foot for stress fractures\par Has weight gain, taking lasix once daily

## 2019-11-26 ENCOUNTER — LABORATORY RESULT (OUTPATIENT)
Age: 74
End: 2019-11-26

## 2019-12-02 ENCOUNTER — OTHER (OUTPATIENT)
Age: 74
End: 2019-12-02

## 2019-12-03 ENCOUNTER — APPOINTMENT (OUTPATIENT)
Dept: TRANSPLANT | Facility: CLINIC | Age: 74
End: 2019-12-03

## 2019-12-11 ENCOUNTER — APPOINTMENT (OUTPATIENT)
Dept: ULTRASOUND IMAGING | Facility: CLINIC | Age: 74
End: 2019-12-11
Payer: MEDICARE

## 2019-12-11 ENCOUNTER — OUTPATIENT (OUTPATIENT)
Dept: OUTPATIENT SERVICES | Facility: HOSPITAL | Age: 74
LOS: 1 days | End: 2019-12-11

## 2019-12-11 DIAGNOSIS — Z90.49 ACQUIRED ABSENCE OF OTHER SPECIFIED PARTS OF DIGESTIVE TRACT: Chronic | ICD-10-CM

## 2019-12-11 DIAGNOSIS — Z90.3 ACQUIRED ABSENCE OF STOMACH [PART OF]: Chronic | ICD-10-CM

## 2019-12-11 DIAGNOSIS — Z94.0 KIDNEY TRANSPLANT STATUS: ICD-10-CM

## 2019-12-11 DIAGNOSIS — Z98.84 BARIATRIC SURGERY STATUS: Chronic | ICD-10-CM

## 2019-12-11 LAB
25(OH)D3 SERPL-MCNC: 22.4 NG/ML
ALBUMIN SERPL ELPH-MCNC: 4.4 G/DL
ALP BLD-CCNC: 123 U/L
ALT SERPL-CCNC: 14 U/L
ANION GAP SERPL CALC-SCNC: 14 MMOL/L
APPEARANCE: CLEAR
AST SERPL-CCNC: 17 U/L
BACTERIA: NEGATIVE
BASOPHILS # BLD AUTO: 0.06 K/UL
BASOPHILS NFR BLD AUTO: 0.8 %
BILIRUB SERPL-MCNC: 0.3 MG/DL
BILIRUBIN URINE: NEGATIVE
BKV DNA SPEC QL NAA+PROBE: NOT DETECTED COPIES/ML
BLOOD URINE: NEGATIVE
BUN SERPL-MCNC: 35 MG/DL
CALCIUM SERPL-MCNC: 8.7 MG/DL
CALCIUM SERPL-MCNC: 8.7 MG/DL
CHLORIDE SERPL-SCNC: 104 MMOL/L
CHOLEST SERPL-MCNC: 190 MG/DL
CHOLEST/HDLC SERPL: 5.1 RATIO
CO2 SERPL-SCNC: 24 MMOL/L
COLOR: COLORLESS
CREAT SERPL-MCNC: 2.33 MG/DL
CREAT SPEC-SCNC: 27 MG/DL
CREAT/PROT UR: NORMAL RATIO
EOSINOPHIL # BLD AUTO: 0.15 K/UL
EOSINOPHIL NFR BLD AUTO: 1.9 %
ESTIMATED AVERAGE GLUCOSE: 134 MG/DL
GLUCOSE QUALITATIVE U: NEGATIVE
GLUCOSE SERPL-MCNC: 71 MG/DL
HBA1C MFR BLD HPLC: 6.3 %
HCT VFR BLD CALC: 39.6 %
HDLC SERPL-MCNC: 37 MG/DL
HGB BLD-MCNC: 12.1 G/DL
HYALINE CASTS: 0 /LPF
IMM GRANULOCYTES NFR BLD AUTO: 0.3 %
KETONES URINE: NEGATIVE
LDH SERPL-CCNC: 210 U/L
LDLC SERPL CALC-MCNC: 119 MG/DL
LEUKOCYTE ESTERASE URINE: NEGATIVE
LYMPHOCYTES # BLD AUTO: 1.01 K/UL
LYMPHOCYTES NFR BLD AUTO: 12.7 %
MAGNESIUM SERPL-MCNC: 1.8 MG/DL
MAN DIFF?: NORMAL
MCHC RBC-ENTMCNC: 30.6 GM/DL
MCHC RBC-ENTMCNC: 30.6 PG
MCV RBC AUTO: 100.3 FL
MICROSCOPIC-UA: NORMAL
MONOCYTES # BLD AUTO: 0.57 K/UL
MONOCYTES NFR BLD AUTO: 7.2 %
NEUTROPHILS # BLD AUTO: 6.15 K/UL
NEUTROPHILS NFR BLD AUTO: 77.1 %
NITRITE URINE: NEGATIVE
PARATHYROID HORMONE INTACT: 184 PG/ML
PH URINE: 6
PHOSPHATE SERPL-MCNC: 2.7 MG/DL
PLATELET # BLD AUTO: 248 K/UL
POTASSIUM SERPL-SCNC: 5 MMOL/L
PROT SERPL-MCNC: 7.2 G/DL
PROT UR-MCNC: <4 MG/DL
PROTEIN URINE: NEGATIVE
RBC # BLD: 3.95 M/UL
RBC # FLD: 12.8 %
RED BLOOD CELLS URINE: 0 /HPF
SODIUM SERPL-SCNC: 142 MMOL/L
SPECIFIC GRAVITY URINE: 1.01
SQUAMOUS EPITHELIAL CELLS: 0 /HPF
TACROLIMUS SERPL-MCNC: 8.8 NG/ML
TRIGL SERPL-MCNC: 168 MG/DL
URATE SERPL-MCNC: 9.8 MG/DL
UROBILINOGEN URINE: NORMAL
WBC # FLD AUTO: 7.96 K/UL
WHITE BLOOD CELLS URINE: 1 /HPF

## 2019-12-11 PROCEDURE — 76776 US EXAM K TRANSPL W/DOPPLER: CPT | Mod: 26,RT

## 2019-12-12 ENCOUNTER — INPATIENT (INPATIENT)
Facility: HOSPITAL | Age: 74
LOS: 3 days | Discharge: HOME | DRG: 699 | End: 2019-12-16
Attending: TRANSPLANT SURGERY | Admitting: TRANSPLANT SURGERY
Payer: MEDICARE

## 2019-12-12 VITALS
WEIGHT: 264.55 LBS | RESPIRATION RATE: 18 BRPM | OXYGEN SATURATION: 98 % | SYSTOLIC BLOOD PRESSURE: 152 MMHG | TEMPERATURE: 97 F | HEIGHT: 71 IN | DIASTOLIC BLOOD PRESSURE: 88 MMHG | HEART RATE: 88 BPM

## 2019-12-12 DIAGNOSIS — N17.9 ACUTE KIDNEY FAILURE, UNSPECIFIED: ICD-10-CM

## 2019-12-12 DIAGNOSIS — E87.5 HYPERKALEMIA: ICD-10-CM

## 2019-12-12 DIAGNOSIS — Z94.0 KIDNEY TRANSPLANT STATUS: ICD-10-CM

## 2019-12-12 DIAGNOSIS — Z90.3 ACQUIRED ABSENCE OF STOMACH [PART OF]: Chronic | ICD-10-CM

## 2019-12-12 DIAGNOSIS — Z90.49 ACQUIRED ABSENCE OF OTHER SPECIFIED PARTS OF DIGESTIVE TRACT: Chronic | ICD-10-CM

## 2019-12-12 DIAGNOSIS — D89.9 DISORDER INVOLVING THE IMMUNE MECHANISM, UNSPECIFIED: ICD-10-CM

## 2019-12-12 DIAGNOSIS — Z98.84 BARIATRIC SURGERY STATUS: Chronic | ICD-10-CM

## 2019-12-12 LAB
ALBUMIN SERPL ELPH-MCNC: 3.9 G/DL — SIGNIFICANT CHANGE UP (ref 3.3–5)
ALBUMIN SERPL ELPH-MCNC: 4.4 G/DL
ALP SERPL-CCNC: 115 U/L — SIGNIFICANT CHANGE UP (ref 40–120)
ALT FLD-CCNC: 16 U/L — SIGNIFICANT CHANGE UP (ref 10–45)
ANION GAP SERPL CALC-SCNC: 11 MMOL/L — SIGNIFICANT CHANGE UP (ref 5–17)
ANION GAP SERPL CALC-SCNC: 13 MMOL/L
ANION GAP SERPL CALC-SCNC: 13 MMOL/L — SIGNIFICANT CHANGE UP (ref 5–17)
ANION GAP SERPL CALC-SCNC: 15 MMOL/L — SIGNIFICANT CHANGE UP (ref 5–17)
APTT BLD: 29.5 SEC
APTT BLD: 31.1 SEC — SIGNIFICANT CHANGE UP (ref 27.5–36.3)
AST SERPL-CCNC: 44 U/L — HIGH (ref 10–40)
BASOPHILS # BLD AUTO: 0.07 K/UL
BASOPHILS NFR BLD AUTO: 0.8 %
BILIRUB SERPL-MCNC: 0.3 MG/DL — SIGNIFICANT CHANGE UP (ref 0.2–1.2)
BLD GP AB SCN SERPL QL: NEGATIVE — SIGNIFICANT CHANGE UP
BUN SERPL-MCNC: 42 MG/DL
BUN SERPL-MCNC: 45 MG/DL — HIGH (ref 7–23)
BUN SERPL-MCNC: 45 MG/DL — HIGH (ref 7–23)
BUN SERPL-MCNC: 46 MG/DL — HIGH (ref 7–23)
CALCIUM SERPL-MCNC: 8.3 MG/DL — LOW (ref 8.4–10.5)
CALCIUM SERPL-MCNC: 8.3 MG/DL — LOW (ref 8.4–10.5)
CALCIUM SERPL-MCNC: 8.5 MG/DL
CALCIUM SERPL-MCNC: 8.6 MG/DL — SIGNIFICANT CHANGE UP (ref 8.4–10.5)
CHLORIDE SERPL-SCNC: 100 MMOL/L — SIGNIFICANT CHANGE UP (ref 96–108)
CHLORIDE SERPL-SCNC: 102 MMOL/L — SIGNIFICANT CHANGE UP (ref 96–108)
CHLORIDE SERPL-SCNC: 103 MMOL/L — SIGNIFICANT CHANGE UP (ref 96–108)
CHLORIDE SERPL-SCNC: 104 MMOL/L
CO2 SERPL-SCNC: 18 MMOL/L — LOW (ref 22–31)
CO2 SERPL-SCNC: 20 MMOL/L — LOW (ref 22–31)
CO2 SERPL-SCNC: 21 MMOL/L — LOW (ref 22–31)
CO2 SERPL-SCNC: 24 MMOL/L
CREAT ?TM UR-MCNC: 72 MG/DL — SIGNIFICANT CHANGE UP
CREAT ?TM UR-MCNC: 85 MG/DL — SIGNIFICANT CHANGE UP
CREAT SERPL-MCNC: 2.51 MG/DL
CREAT SERPL-MCNC: 2.53 MG/DL — HIGH (ref 0.5–1.3)
CREAT SERPL-MCNC: 2.58 MG/DL — HIGH (ref 0.5–1.3)
CREAT SERPL-MCNC: 2.67 MG/DL — HIGH (ref 0.5–1.3)
EOSINOPHIL # BLD AUTO: 0.16 K/UL
EOSINOPHIL NFR BLD AUTO: 1.9 %
GLUCOSE BLDC GLUCOMTR-MCNC: 119 MG/DL — HIGH (ref 70–99)
GLUCOSE BLDC GLUCOMTR-MCNC: 193 MG/DL — HIGH (ref 70–99)
GLUCOSE BLDC GLUCOMTR-MCNC: 331 MG/DL — HIGH (ref 70–99)
GLUCOSE BLDC GLUCOMTR-MCNC: 348 MG/DL — HIGH (ref 70–99)
GLUCOSE SERPL-MCNC: 126 MG/DL
GLUCOSE SERPL-MCNC: 179 MG/DL — HIGH (ref 70–99)
GLUCOSE SERPL-MCNC: 364 MG/DL — HIGH (ref 70–99)
GLUCOSE SERPL-MCNC: 378 MG/DL — HIGH (ref 70–99)
HCT VFR BLD CALC: 36.8 % — LOW (ref 39–50)
HCT VFR BLD CALC: 39.4 %
HGB BLD-MCNC: 11.4 G/DL — LOW (ref 13–17)
HGB BLD-MCNC: 12 G/DL
IMM GRANULOCYTES NFR BLD AUTO: 0.2 %
INR BLD: 0.98 RATIO — SIGNIFICANT CHANGE UP (ref 0.88–1.16)
INR PPP: 0.99 RATIO
LYMPHOCYTES # BLD AUTO: 1.5 K/UL
LYMPHOCYTES NFR BLD AUTO: 18.1 %
MAGNESIUM SERPL-MCNC: 1.9 MG/DL — SIGNIFICANT CHANGE UP (ref 1.6–2.6)
MAN DIFF?: NORMAL
MCHC RBC-ENTMCNC: 30.2 PG
MCHC RBC-ENTMCNC: 30.5 GM/DL
MCHC RBC-ENTMCNC: 30.6 PG — SIGNIFICANT CHANGE UP (ref 27–34)
MCHC RBC-ENTMCNC: 31 GM/DL — LOW (ref 32–36)
MCV RBC AUTO: 98.7 FL — SIGNIFICANT CHANGE UP (ref 80–100)
MCV RBC AUTO: 99 FL
MONOCYTES # BLD AUTO: 0.72 K/UL
MONOCYTES NFR BLD AUTO: 8.7 %
NEUTROPHILS # BLD AUTO: 5.82 K/UL
NEUTROPHILS NFR BLD AUTO: 70.3 %
NRBC # BLD: 0 /100 WBCS — SIGNIFICANT CHANGE UP (ref 0–0)
PHOSPHATE SERPL-MCNC: 3.4 MG/DL — SIGNIFICANT CHANGE UP (ref 2.5–4.5)
PHOSPHATE SERPL-MCNC: 3.5 MG/DL
PLATELET # BLD AUTO: 221 K/UL — SIGNIFICANT CHANGE UP (ref 150–400)
PLATELET # BLD AUTO: 256 K/UL
POTASSIUM SERPL-MCNC: 4.7 MMOL/L — SIGNIFICANT CHANGE UP (ref 3.5–5.3)
POTASSIUM SERPL-MCNC: 6.2 MMOL/L — CRITICAL HIGH (ref 3.5–5.3)
POTASSIUM SERPL-MCNC: 7.1 MMOL/L — CRITICAL HIGH (ref 3.5–5.3)
POTASSIUM SERPL-SCNC: 4.7 MMOL/L — SIGNIFICANT CHANGE UP (ref 3.5–5.3)
POTASSIUM SERPL-SCNC: 5.3 MMOL/L
POTASSIUM SERPL-SCNC: 6.2 MMOL/L — CRITICAL HIGH (ref 3.5–5.3)
POTASSIUM SERPL-SCNC: 7.1 MMOL/L — CRITICAL HIGH (ref 3.5–5.3)
POTASSIUM UR-SCNC: 19 MMOL/L — SIGNIFICANT CHANGE UP
PROT SERPL-MCNC: 7.8 G/DL — SIGNIFICANT CHANGE UP (ref 6–8.3)
PROTHROM AB SERPL-ACNC: 11.3 SEC — SIGNIFICANT CHANGE UP (ref 10–12.9)
PT BLD: 11.4 SEC
RBC # BLD: 3.73 M/UL — LOW (ref 4.2–5.8)
RBC # BLD: 3.98 M/UL
RBC # FLD: 12.7 %
RBC # FLD: 12.8 % — SIGNIFICANT CHANGE UP (ref 10.3–14.5)
RH IG SCN BLD-IMP: POSITIVE — SIGNIFICANT CHANGE UP
SODIUM SERPL-SCNC: 132 MMOL/L — LOW (ref 135–145)
SODIUM SERPL-SCNC: 133 MMOL/L — LOW (ref 135–145)
SODIUM SERPL-SCNC: 138 MMOL/L — SIGNIFICANT CHANGE UP (ref 135–145)
SODIUM SERPL-SCNC: 141 MMOL/L
SODIUM UR-SCNC: 93 MMOL/L — SIGNIFICANT CHANGE UP
SODIUM UR-SCNC: 97 MMOL/L — SIGNIFICANT CHANGE UP
WBC # BLD: 10.23 K/UL — SIGNIFICANT CHANGE UP (ref 3.8–10.5)
WBC # FLD AUTO: 10.23 K/UL — SIGNIFICANT CHANGE UP (ref 3.8–10.5)
WBC # FLD AUTO: 8.29 K/UL

## 2019-12-12 PROCEDURE — 99222 1ST HOSP IP/OBS MODERATE 55: CPT | Mod: GC

## 2019-12-12 PROCEDURE — 93010 ELECTROCARDIOGRAM REPORT: CPT

## 2019-12-12 RX ORDER — CALCIUM GLUCONATE 100 MG/ML
1 VIAL (ML) INTRAVENOUS ONCE
Refills: 0 | Status: COMPLETED | OUTPATIENT
Start: 2019-12-12 | End: 2019-12-12

## 2019-12-12 RX ORDER — MAGNESIUM OXIDE 400 MG ORAL TABLET 241.3 MG
1 TABLET ORAL
Qty: 0 | Refills: 0 | DISCHARGE

## 2019-12-12 RX ORDER — MAGNESIUM OXIDE 400 MG ORAL TABLET 241.3 MG
400 TABLET ORAL DAILY
Refills: 0 | Status: DISCONTINUED | OUTPATIENT
Start: 2019-12-12 | End: 2019-12-16

## 2019-12-12 RX ORDER — DEXTROSE 50 % IN WATER 50 %
25 SYRINGE (ML) INTRAVENOUS ONCE
Refills: 0 | Status: DISCONTINUED | OUTPATIENT
Start: 2019-12-12 | End: 2019-12-16

## 2019-12-12 RX ORDER — METHYLPREDNISOLONE 4 MG
750 TABLET ORAL
Qty: 0 | Refills: 0 | DISCHARGE

## 2019-12-12 RX ORDER — DEXTROSE 50 % IN WATER 50 %
12.5 SYRINGE (ML) INTRAVENOUS ONCE
Refills: 0 | Status: DISCONTINUED | OUTPATIENT
Start: 2019-12-12 | End: 2019-12-16

## 2019-12-12 RX ORDER — INSULIN HUMAN 100 [IU]/ML
10 INJECTION, SOLUTION SUBCUTANEOUS ONCE
Refills: 0 | Status: COMPLETED | OUTPATIENT
Start: 2019-12-12 | End: 2019-12-12

## 2019-12-12 RX ORDER — LANOLIN ALCOHOL/MO/W.PET/CERES
5 CREAM (GRAM) TOPICAL AT BEDTIME
Refills: 0 | Status: DISCONTINUED | OUTPATIENT
Start: 2019-12-12 | End: 2019-12-16

## 2019-12-12 RX ORDER — SODIUM BICARBONATE 1 MEQ/ML
2 SYRINGE (ML) INTRAVENOUS
Qty: 0 | Refills: 0 | DISCHARGE

## 2019-12-12 RX ORDER — SODIUM ZIRCONIUM CYCLOSILICATE 10 G/10G
10 POWDER, FOR SUSPENSION ORAL THREE TIMES A DAY
Refills: 0 | Status: DISCONTINUED | OUTPATIENT
Start: 2019-12-12 | End: 2019-12-13

## 2019-12-12 RX ORDER — INSULIN GLARGINE 100 [IU]/ML
50 INJECTION, SOLUTION SUBCUTANEOUS EVERY MORNING
Refills: 0 | Status: DISCONTINUED | OUTPATIENT
Start: 2019-12-13 | End: 2019-12-16

## 2019-12-12 RX ORDER — FUROSEMIDE 40 MG
40 TABLET ORAL
Refills: 0 | Status: DISCONTINUED | OUTPATIENT
Start: 2019-12-12 | End: 2019-12-13

## 2019-12-12 RX ORDER — TRAMADOL HYDROCHLORIDE 50 MG/1
50 TABLET ORAL EVERY 6 HOURS
Refills: 0 | Status: DISCONTINUED | OUTPATIENT
Start: 2019-12-12 | End: 2019-12-13

## 2019-12-12 RX ORDER — GLUCAGON INJECTION, SOLUTION 0.5 MG/.1ML
1 INJECTION, SOLUTION SUBCUTANEOUS ONCE
Refills: 0 | Status: DISCONTINUED | OUTPATIENT
Start: 2019-12-12 | End: 2019-12-16

## 2019-12-12 RX ORDER — MYCOPHENOLIC ACID 180 MG/1
360 TABLET, DELAYED RELEASE ORAL
Refills: 0 | Status: DISCONTINUED | OUTPATIENT
Start: 2019-12-12 | End: 2019-12-13

## 2019-12-12 RX ORDER — LEVOTHYROXINE SODIUM 125 MCG
225 TABLET ORAL
Qty: 0 | Refills: 0 | DISCHARGE

## 2019-12-12 RX ORDER — SODIUM BICARBONATE 1 MEQ/ML
1300 SYRINGE (ML) INTRAVENOUS
Refills: 0 | Status: DISCONTINUED | OUTPATIENT
Start: 2019-12-12 | End: 2019-12-16

## 2019-12-12 RX ORDER — INSULIN LISPRO 100/ML
VIAL (ML) SUBCUTANEOUS
Refills: 0 | Status: DISCONTINUED | OUTPATIENT
Start: 2019-12-12 | End: 2019-12-16

## 2019-12-12 RX ORDER — INSULIN LISPRO 100/ML
5 VIAL (ML) SUBCUTANEOUS
Refills: 0 | Status: DISCONTINUED | OUTPATIENT
Start: 2019-12-12 | End: 2019-12-16

## 2019-12-12 RX ORDER — DEXTROSE 50 % IN WATER 50 %
15 SYRINGE (ML) INTRAVENOUS ONCE
Refills: 0 | Status: DISCONTINUED | OUTPATIENT
Start: 2019-12-12 | End: 2019-12-16

## 2019-12-12 RX ORDER — MYCOPHENOLIC ACID 180 MG/1
1 TABLET, DELAYED RELEASE ORAL
Qty: 0 | Refills: 0 | DISCHARGE

## 2019-12-12 RX ORDER — INSULIN ASPART 100 [IU]/ML
5 INJECTION, SOLUTION SUBCUTANEOUS
Qty: 0 | Refills: 0 | DISCHARGE

## 2019-12-12 RX ORDER — ACETAMINOPHEN 500 MG
650 TABLET ORAL EVERY 6 HOURS
Refills: 0 | Status: DISCONTINUED | OUTPATIENT
Start: 2019-12-12 | End: 2019-12-16

## 2019-12-12 RX ORDER — DEXTROSE 50 % IN WATER 50 %
25 SYRINGE (ML) INTRAVENOUS ONCE
Refills: 0 | Status: COMPLETED | OUTPATIENT
Start: 2019-12-12 | End: 2019-12-12

## 2019-12-12 RX ORDER — INSULIN LISPRO 100/ML
VIAL (ML) SUBCUTANEOUS AT BEDTIME
Refills: 0 | Status: DISCONTINUED | OUTPATIENT
Start: 2019-12-12 | End: 2019-12-16

## 2019-12-12 RX ORDER — SODIUM CHLORIDE 9 MG/ML
1000 INJECTION, SOLUTION INTRAVENOUS
Refills: 0 | Status: DISCONTINUED | OUTPATIENT
Start: 2019-12-12 | End: 2019-12-16

## 2019-12-12 RX ORDER — CLOTRIMAZOLE 10 MG
1 TROCHE MUCOUS MEMBRANE
Qty: 0 | Refills: 0 | DISCHARGE

## 2019-12-12 RX ORDER — LEVOTHYROXINE SODIUM 125 MCG
225 TABLET ORAL DAILY
Refills: 0 | Status: DISCONTINUED | OUTPATIENT
Start: 2019-12-12 | End: 2019-12-16

## 2019-12-12 RX ORDER — LEVOTHYROXINE SODIUM 125 MCG
1 TABLET ORAL
Qty: 0 | Refills: 0 | DISCHARGE

## 2019-12-12 RX ORDER — PANTOPRAZOLE SODIUM 20 MG/1
40 TABLET, DELAYED RELEASE ORAL
Refills: 0 | Status: DISCONTINUED | OUTPATIENT
Start: 2019-12-12 | End: 2019-12-16

## 2019-12-12 RX ORDER — TACROLIMUS 5 MG/1
9 CAPSULE ORAL DAILY
Refills: 0 | Status: DISCONTINUED | OUTPATIENT
Start: 2019-12-13 | End: 2019-12-13

## 2019-12-12 RX ORDER — SODIUM CHLORIDE 9 MG/ML
1000 INJECTION INTRAMUSCULAR; INTRAVENOUS; SUBCUTANEOUS ONCE
Refills: 0 | Status: COMPLETED | OUTPATIENT
Start: 2019-12-12 | End: 2019-12-12

## 2019-12-12 RX ORDER — INSULIN GLARGINE 100 [IU]/ML
50 INJECTION, SOLUTION SUBCUTANEOUS
Qty: 0 | Refills: 0 | DISCHARGE

## 2019-12-12 RX ORDER — INSULIN DETEMIR 100/ML (3)
50 INSULIN PEN (ML) SUBCUTANEOUS
Qty: 0 | Refills: 0 | DISCHARGE

## 2019-12-12 RX ORDER — CALCIUM CARBONATE 500(1250)
1 TABLET ORAL DAILY
Refills: 0 | Status: DISCONTINUED | OUTPATIENT
Start: 2019-12-12 | End: 2019-12-16

## 2019-12-12 RX ORDER — DEXTROSE 50 % IN WATER 50 %
50 SYRINGE (ML) INTRAVENOUS ONCE
Refills: 0 | Status: DISCONTINUED | OUTPATIENT
Start: 2019-12-12 | End: 2019-12-12

## 2019-12-12 RX ORDER — SODIUM CHLORIDE 9 MG/ML
1000 INJECTION INTRAMUSCULAR; INTRAVENOUS; SUBCUTANEOUS
Refills: 0 | Status: DISCONTINUED | OUTPATIENT
Start: 2019-12-12 | End: 2019-12-13

## 2019-12-12 RX ADMIN — Medication 8: at 18:15

## 2019-12-12 RX ADMIN — MYCOPHENOLIC ACID 360 MILLIGRAM(S): 180 TABLET, DELAYED RELEASE ORAL at 18:35

## 2019-12-12 RX ADMIN — SODIUM CHLORIDE 1000 MILLILITER(S): 9 INJECTION INTRAMUSCULAR; INTRAVENOUS; SUBCUTANEOUS at 17:58

## 2019-12-12 RX ADMIN — Medication 650 MILLIGRAM(S): at 22:14

## 2019-12-12 RX ADMIN — Medication 5 UNIT(S): at 18:16

## 2019-12-12 RX ADMIN — SODIUM ZIRCONIUM CYCLOSILICATE 10 GRAM(S): 10 POWDER, FOR SUSPENSION ORAL at 20:23

## 2019-12-12 RX ADMIN — Medication 1300 MILLIGRAM(S): at 17:51

## 2019-12-12 RX ADMIN — INSULIN HUMAN 10 UNIT(S): 100 INJECTION, SOLUTION SUBCUTANEOUS at 19:02

## 2019-12-12 RX ADMIN — Medication 100 GRAM(S): at 20:23

## 2019-12-12 RX ADMIN — Medication 25 MILLILITER(S): at 19:00

## 2019-12-12 RX ADMIN — Medication 40 MILLIGRAM(S): at 17:51

## 2019-12-12 RX ADMIN — Medication 650 MILLIGRAM(S): at 21:44

## 2019-12-12 NOTE — H&P ADULT - NSICDXPASTMEDICALHX_GEN_ALL_CORE_FT
PAST MEDICAL HISTORY:  DM2 (diabetes mellitus, type 2)     ESRD on peritoneal dialysis     HLD (hyperlipidemia)     Hypothyroidism     Marginal ulcer     Morbid obesity

## 2019-12-12 NOTE — H&P ADULT - NSHPLABSRESULTS_GEN_ALL_CORE
CBC (12-12 @ 16:47)                              11.4<L>                         10.23   )----------------(  221        --    % Neutrophils, --    % Lymphocytes, ANC: --                                  36.8<L>    BMP (12-12 @ 16:47)             133<L>  |  102     |  45<H> 		Ca++ --      Ca 8.3<L>             ---------------------------------( 364<H>		Mg 1.9                7.1<HH>  |  18<L>   |  2.53<H>			Ph 3.4       LFTs (12-12 @ 16:47)      TPro 7.8 / Alb 3.9 / TBili 0.3 / DBili -- / AST 44<H> / ALT 16 / AlkPhos 115    Coags (12-12 @ 16:47)  aPTT 31.1 / INR 0.98 / PT 11.3

## 2019-12-12 NOTE — H&P ADULT - ASSESSMENT
ASSESSMENT:  74M pmhx of ESRD s/p DDRT on 2/1/19, DM, obesity s/p R-en-Y gastric bypass (2005 w/ Dr. Mckeon, lost ~600lbs), GODFREY, hypothyroidism, GI bleed in 2016 from a marginal ulcer (according to paper documentation from [facility]) which required resection and revision (Dr. Mckeon), and a subsequent GI bleed that was temporized by endoscopy. Patient was sent in today from the office for a rising creatinine on outpatient labs.    PLAN:  - Stat admission labs  - Restart home meds  - Strict I&Os  - Will send urine Na/Cr/Urea to help determine cause of ABELARDO  - May try fluid challenge  - Will consider sending DSA, kidney biopsy, treating for rejection if obvious causes ruled out    Seen and examined with Dr. Raman Rucker, PGY-3  Transplant Surgery [phone]

## 2019-12-12 NOTE — H&P ADULT - NSHPPHYSICALEXAM_GEN_ALL_CORE
Gen: AAOx3, NAD, mentating normally  Neuro: Cranial nerves II-XII grossly intact  HEENT: Atraumatic, normocephalic  CV: RRR, normal S1/S2, no audible m/r/g  Pulm: Breathing comfortably on RA. Equal chest rise b/l. Lung fields CTAB  Abd: Obese. Well-healed surgical scars. Soft, NT/ND.   Back/flank: No CVA tenderness  Extremities: WWP. Moving all 4 extremities spontaneously. Strength 5/5. Sensation intact  Skin: No rashes or suspicious lesions

## 2019-12-12 NOTE — H&P ADULT - NSICDXPASTSURGICALHX_GEN_ALL_CORE_FT
PAST SURGICAL HISTORY:  History of cholecystectomy     History of partial gastrectomy     History of Porsha-en-Y gastric bypass

## 2019-12-12 NOTE — CONSULT NOTE ADULT - ASSESSMENT
Patient with multifactorial ABELARDO in the setting of DDRT.    Kidney Donor  CPRA: 0%   UNOS Donor ID: [id number]  Match: [id number]  OPO:  [place] on [place]  Donor Age: 59  ABO:  O  KDPI: 81%  COD: anoxia – drug intoxication  X Clamp Time : 01/31/2019 16:32  Medical Hx: Asthma, Depression, drug use (cocaine), ETOH  Terminal Scr ~2.8-3.0  CMV- /EBV +

## 2019-12-12 NOTE — CONSULT NOTE ADULT - PROBLEM SELECTOR RECOMMENDATION 4
Pt. with hyperkalemia in setting of ABELARDO, Bactrim use, and possible CNI toxicity. Serum potassium upon admission was 6.1. Hyperkalemia treated with Insulin 10 units and Lokelma 10 mg. Repeat serum potassium around 10 pm. Can give another dose of Lokelma if still high, total 30 mg in 1 day. Bicarbonate also noted to be low, start sodium bicarbonate 1300 mg BID. Low potassium diet advised. No indication for HD. Monitor serum potassium Pt. with hyperkalemia in setting of ABELARDO, Bactrim use, hyperglycemia and possible CNI toxicity. Serum potassium upon admission was 6.1. Hyperkalemia treated with Insulin 10 units and Lokelma 10 mg. Repeat serum potassium around 10 pm. Can give another dose of Lokelma if still high, total 30 mg in 1 day. Bicarbonate also noted to be low, start sodium bicarbonate 1300 mg BID. Monitor CBGs, goal CBGs < 180 mg/dl. Low potassium diet advised. No indication for HD. Monitor serum potassium

## 2019-12-12 NOTE — CONSULT NOTE ADULT - PROBLEM SELECTOR RECOMMENDATION 2
Patient s/p DDRT on 2/1/2019 complicated by DGF. Patient on  mg BID, Prednisone 5 mg, and Envarsus 9 mg daily. Would hold Envarsus for now as patient hyperkalemic and with ABELARDO - possible CNI toxicity?? Check level in AM tomorrow. Resume MMF and Prednisone. Hold Bactrim for now due to hyperkalemia. Monitor Scr.

## 2019-12-12 NOTE — H&P ADULT - HISTORY OF PRESENT ILLNESS
74M pmhx of ESRD s/p DDRT on 2/1/19, DM, obesity s/p R-en-Y gastric bypass (2005 w/ Dr. Mckeon, lost ~600lbs), GODFREY, hypothyroidism, GI bleed in 2016 from a marginal ulcer (according to paper documentation from [facility]) which required resection and revision (Dr. Mckeon), and a subsequent GI bleed that was temporized by endoscopy. Patient was sent in today from the office for a rising creatinine on outpatient labs. According to the patient as well as outpt documentation, his creatinine was recently noted to be 2.5, from a baseline of ~1.2. The patient reports feeling well. The only recent changes to his health is that he reports some recent tiredness and increased urinary frequency. He reports checking his finger sticks at home and them being between . He denies any other changes to his health including headaches, blurry vision, CP, SOB, changes in bowel habits, dysuria, or new swelling. He does report recent intentional weight loss of 15 lbs.

## 2019-12-12 NOTE — CONSULT NOTE ADULT - PROBLEM SELECTOR RECOMMENDATION 9
Patient with ABELARDO in the setting of DDRT on 2/1/2019 complicated by DGF. Kwesi ORTIZ reviewed, Scr baseline is ~1.5. Scr was 1.52 on 10/25/2019, repeat on 11/26/2019 was 1.89, and then repeat on 12/11/2019 was 2.51. Today Scr is 2.67. Urine lytes not consistent with pre-renal ABELARDO. Recommend to check UA, repeat urine electrolytes with urine urea. Please get dedicated US Transplant. Would send DSA. Agree with IVF for now. Monitor Scr, monitor urine output. Avoid potential nephrotoxins.

## 2019-12-12 NOTE — CONSULT NOTE ADULT - ATTENDING COMMENTS
DDRT, ABELARDO, non oliguric  DM, HTN  Reviewed immunosuppression and allograft function  Plan  Allograft sonogram with doppler, DSA, echo  Continue current immunosuppression, Tacrolimus trough level 6-8 ng/ml  Hold diuretics  Monitor blood chemistry, I/O  Will follow  I was present during and reviewed clinical and lab data as well as assessment and plan as documented by the housestaff as noted. Please contact if any additional questions with any change in clinical condition or on availability of any additional information or reports.

## 2019-12-12 NOTE — CONSULT NOTE ADULT - SUBJECTIVE AND OBJECTIVE BOX
[facility] DIVISION OF KIDNEY DISEASES AND HYPERTENSION -- [phone]  -- INITIAL CONSULT NOTE  --------------------------------------------------------------------------------  HPI: 74M PMH of ESRD s/p DDRT on 2/1/19, DM, obesity s/p R-en-Y gastric bypass (2005 w/ Dr. Mckeon, lost ~600lbs), GODFREY, hypothyroidism, GI bleed in 2016 was advised to come to [facility] from Transplant clinic due to rising serum creatinine. [facility] reviewed, Scr baseline is ~1.5. Scr was 1.52 on 10/25/2019, repeat on 11/26/2019 was 1.89, and then repeat on 12/11/2019 was 2.51. Today Scr is 2.67 with hyperkalemia of 6.1. The patient reports feeling well. The only recent changes to his health is that he reports some recent tiredness and increased urinary frequency. He reports checking his finger sticks at home and them being between . He does report recent intentional weight loss of 15 lbs. Patient noted to be Lasix 40 mg PO BID in clinic medications. Patient denies any NSAIDs use, any dysuria, and hematuria, any transplant pain. Patient is s/p DDRT on 2/1/2019 complicated by DGF requiring PD.     PAST HISTORY  --------------------------------------------------------------------------------  PAST MEDICAL & SURGICAL HISTORY:  Marginal ulcer  DM2 (diabetes mellitus, type 2)  Morbid obesity  Hypothyroidism  ESRD on peritoneal dialysis  HLD (hyperlipidemia)  History of partial gastrectomy  History of cholecystectomy  History of Porsha-en-Y gastric bypass    FAMILY HISTORY: denies    PAST SOCIAL HISTORY: occasional wine drinker, denies smoking    ALLERGIES & MEDICATIONS  --------------------------------------------------------------------------------  Allergies    Lipitor (Hepatotoxicity)    Intolerances    Standing Inpatient Medications  calcium carbonate    500 mG (Tums) Chewable 1 Tablet(s) Chew daily  calcium gluconate IVPB 1 Gram(s) IV Intermittent once  dextrose 5%. 1000 milliLiter(s) IV Continuous <Continuous>  dextrose 50% Injectable 12.5 Gram(s) IV Push once  dextrose 50% Injectable 25 Gram(s) IV Push once  dextrose 50% Injectable 25 Gram(s) IV Push once  furosemide    Tablet 40 milliGRAM(s) Oral two times a day  insulin lispro (HumaLOG) corrective regimen sliding scale   SubCutaneous three times a day before meals  insulin lispro (HumaLOG) corrective regimen sliding scale   SubCutaneous at bedtime  insulin lispro Injectable (HumaLOG) 5 Unit(s) SubCutaneous before breakfast  insulin lispro Injectable (HumaLOG) 5 Unit(s) SubCutaneous before lunch  insulin lispro Injectable (HumaLOG) 5 Unit(s) SubCutaneous before dinner  levothyroxine 225 MICROGram(s) Oral daily  magnesium oxide 400 milliGRAM(s) Oral daily  mycophenolic acid  milliGRAM(s) Oral two times a day  pantoprazole    Tablet 40 milliGRAM(s) Oral before breakfast  sodium bicarbonate 1300 milliGRAM(s) Oral two times a day  sodium zirconium cyclosilicate 10 Gram(s) Oral three times a day  trimethoprim   80 mG/sulfamethoxazole 400 mG 1 Tablet(s) Oral daily    REVIEW OF SYSTEMS  --------------------------------------------------------------------------------  Gen: No fevers/chills  Skin: No rashes  Head/Eyes/Ears: Normal hearing,   Respiratory: No dyspnea, cough  CV: No chest pain  GI: No abdominal pain, diarrhea  : No dysuria, hematuria  MSK: No  edema  Heme: No easy bruising or bleeding  Psych: No significant depression    All other systems were reviewed and are negative, except as noted.    VITALS/PHYSICAL EXAM  --------------------------------------------------------------------------------  T(C): 36.3 (12-12-19 @ 16:15), Max: 36.3 (12-12-19 @ 16:15)  HR: 88 (12-12-19 @ 16:15) (88 - 88)  BP: 152/88 (12-12-19 @ 16:15) (152/88 - 152/88)  RR: 18 (12-12-19 @ 16:15) (18 - 18)  SpO2: 98% (12-12-19 @ 16:15) (98% - 98%)  Wt(kg): --  Height (cm): 180.3 (12-12-19 @ 16:15)  Weight (kg): 120 (12-12-19 @ 16:15)  BMI (kg/m2): 36.9 (12-12-19 @ 16:15)  BSA (m2): 2.37 (12-12-19 @ 16:15)    12-12-19 @ 07:01  -  12-12-19 @ 19:11  --------------------------------------------------------  IN: 240 mL / OUT: 300 mL / NET: -60 mL    Physical Exam:  	Gen: NAD  	HEENT: MMM  	Pulm: CTA B/L  	CV: S1S2  	Abd: Soft, +BS              Transplant: no overlying tenderness  	Ext: Trace LE edema B/L  	Neuro: Awake  	Skin: Warm and dry  	Vascular access: LUE AV fistula, bruit present    LABS/STUDIES  --------------------------------------------------------------------------------              11.4   10.23 >-----------<  221      [12-12-19 @ 16:47]              36.8     132  |  100  |  45  ----------------------------<  378      [12-12-19 @ 18:03]  6.2   |  21  |  2.67        Ca     8.6     [12-12-19 @ 18:03]      Mg     1.9     [12-12-19 @ 16:47]      Phos  3.4     [12-12-19 @ 16:47]    TPro  7.8  /  Alb  3.9  /  TBili  0.3  /  DBili  x   /  AST  44  /  ALT  16  /  AlkPhos  115  [12-12-19 @ 16:47]    PT/INR: PT 11.3 , INR 0.98       [12-12-19 @ 16:47]  PTT: 31.1       [12-12-19 @ 16:47]    Creatinine Trend:  SCr 2.67 [12-12 @ 18:03]  SCr 2.53 [12-12 @ 16:47]

## 2019-12-13 LAB
ANION GAP SERPL CALC-SCNC: 13 MMOL/L — SIGNIFICANT CHANGE UP (ref 5–17)
BUN SERPL-MCNC: 45 MG/DL — HIGH (ref 7–23)
CA-I BLD-SCNC: 1.12 MMOL/L — SIGNIFICANT CHANGE UP (ref 1.12–1.3)
CALCIUM SERPL-MCNC: 8 MG/DL — LOW (ref 8.4–10.5)
CHLORIDE SERPL-SCNC: 105 MMOL/L — SIGNIFICANT CHANGE UP (ref 96–108)
CMV DNA SPEC QL NAA+PROBE: ABNORMAL IU/ML
CMVPCR LOG: ABNORMAL LOG10IU/ML
CO2 SERPL-SCNC: 21 MMOL/L — LOW (ref 22–31)
CREAT SERPL-MCNC: 2.4 MG/DL — HIGH (ref 0.5–1.3)
GLUCOSE BLDC GLUCOMTR-MCNC: 127 MG/DL — HIGH (ref 70–99)
GLUCOSE BLDC GLUCOMTR-MCNC: 169 MG/DL — HIGH (ref 70–99)
GLUCOSE BLDC GLUCOMTR-MCNC: 174 MG/DL — HIGH (ref 70–99)
GLUCOSE BLDC GLUCOMTR-MCNC: 252 MG/DL — HIGH (ref 70–99)
GLUCOSE SERPL-MCNC: 81 MG/DL — SIGNIFICANT CHANGE UP (ref 70–99)
HCT VFR BLD CALC: 35.2 % — LOW (ref 39–50)
HGB BLD-MCNC: 10.8 G/DL — LOW (ref 13–17)
MAGNESIUM SERPL-MCNC: 1.9 MG/DL — SIGNIFICANT CHANGE UP (ref 1.6–2.6)
MCHC RBC-ENTMCNC: 30.3 PG — SIGNIFICANT CHANGE UP (ref 27–34)
MCHC RBC-ENTMCNC: 30.7 GM/DL — LOW (ref 32–36)
MCV RBC AUTO: 98.9 FL — SIGNIFICANT CHANGE UP (ref 80–100)
NRBC # BLD: 0 /100 WBCS — SIGNIFICANT CHANGE UP (ref 0–0)
PHOSPHATE SERPL-MCNC: 4.1 MG/DL — SIGNIFICANT CHANGE UP (ref 2.5–4.5)
PLATELET # BLD AUTO: 203 K/UL — SIGNIFICANT CHANGE UP (ref 150–400)
POTASSIUM SERPL-MCNC: 4.2 MMOL/L — SIGNIFICANT CHANGE UP (ref 3.5–5.3)
POTASSIUM SERPL-MCNC: 4.2 MMOL/L — SIGNIFICANT CHANGE UP (ref 3.5–5.3)
POTASSIUM SERPL-SCNC: 4.2 MMOL/L — SIGNIFICANT CHANGE UP (ref 3.5–5.3)
POTASSIUM SERPL-SCNC: 4.2 MMOL/L — SIGNIFICANT CHANGE UP (ref 3.5–5.3)
RBC # BLD: 3.56 M/UL — LOW (ref 4.2–5.8)
RBC # FLD: 12.8 % — SIGNIFICANT CHANGE UP (ref 10.3–14.5)
SODIUM SERPL-SCNC: 139 MMOL/L — SIGNIFICANT CHANGE UP (ref 135–145)
TACROLIMUS SERPL-MCNC: 11.4 NG/ML — SIGNIFICANT CHANGE UP
UUN UR-MCNC: 426 MG/DL — SIGNIFICANT CHANGE UP
UUN UR-MCNC: 526 MG/DL — SIGNIFICANT CHANGE UP
WBC # BLD: 9.42 K/UL — SIGNIFICANT CHANGE UP (ref 3.8–10.5)
WBC # FLD AUTO: 9.42 K/UL — SIGNIFICANT CHANGE UP (ref 3.8–10.5)

## 2019-12-13 PROCEDURE — 99232 SBSQ HOSP IP/OBS MODERATE 35: CPT | Mod: GC

## 2019-12-13 PROCEDURE — 99221 1ST HOSP IP/OBS SF/LOW 40: CPT | Mod: GC

## 2019-12-13 RX ORDER — TACROLIMUS 5 MG/1
8 CAPSULE ORAL
Refills: 0 | Status: DISCONTINUED | OUTPATIENT
Start: 2019-12-14 | End: 2019-12-16

## 2019-12-13 RX ORDER — MYCOPHENOLIC ACID 180 MG/1
720 TABLET, DELAYED RELEASE ORAL
Refills: 0 | Status: DISCONTINUED | OUTPATIENT
Start: 2019-12-13 | End: 2019-12-16

## 2019-12-13 RX ORDER — HUMAN INSULIN 100 [IU]/ML
10 INJECTION, SUSPENSION SUBCUTANEOUS ONCE
Refills: 0 | Status: COMPLETED | OUTPATIENT
Start: 2019-12-13 | End: 2019-12-13

## 2019-12-13 RX ORDER — VALGANCICLOVIR 450 MG/1
450 TABLET, FILM COATED ORAL
Refills: 0 | Status: DISCONTINUED | OUTPATIENT
Start: 2019-12-13 | End: 2019-12-16

## 2019-12-13 RX ORDER — NYSTATIN 500MM UNIT
500000 POWDER (EA) MISCELLANEOUS
Refills: 0 | Status: DISCONTINUED | OUTPATIENT
Start: 2019-12-13 | End: 2019-12-16

## 2019-12-13 RX ORDER — MAGNESIUM SULFATE 500 MG/ML
1 VIAL (ML) INJECTION ONCE
Refills: 0 | Status: COMPLETED | OUTPATIENT
Start: 2019-12-13 | End: 2019-12-13

## 2019-12-13 RX ADMIN — MYCOPHENOLIC ACID 360 MILLIGRAM(S): 180 TABLET, DELAYED RELEASE ORAL at 06:26

## 2019-12-13 RX ADMIN — Medication 5 UNIT(S): at 13:29

## 2019-12-13 RX ADMIN — Medication 100 GRAM(S): at 09:02

## 2019-12-13 RX ADMIN — TRAMADOL HYDROCHLORIDE 50 MILLIGRAM(S): 50 TABLET ORAL at 00:04

## 2019-12-13 RX ADMIN — Medication 225 MICROGRAM(S): at 06:26

## 2019-12-13 RX ADMIN — TRAMADOL HYDROCHLORIDE 50 MILLIGRAM(S): 50 TABLET ORAL at 21:52

## 2019-12-13 RX ADMIN — TRAMADOL HYDROCHLORIDE 50 MILLIGRAM(S): 50 TABLET ORAL at 22:40

## 2019-12-13 RX ADMIN — Medication 1 TABLET(S): at 11:17

## 2019-12-13 RX ADMIN — Medication 5 UNIT(S): at 17:35

## 2019-12-13 RX ADMIN — Medication 40 MILLIGRAM(S): at 06:26

## 2019-12-13 RX ADMIN — Medication 2: at 13:30

## 2019-12-13 RX ADMIN — Medication 500000 UNIT(S): at 17:36

## 2019-12-13 RX ADMIN — HUMAN INSULIN 10 UNIT(S): 100 INJECTION, SUSPENSION SUBCUTANEOUS at 11:15

## 2019-12-13 RX ADMIN — MYCOPHENOLIC ACID 720 MILLIGRAM(S): 180 TABLET, DELAYED RELEASE ORAL at 17:36

## 2019-12-13 RX ADMIN — VALGANCICLOVIR 450 MILLIGRAM(S): 450 TABLET, FILM COATED ORAL at 14:39

## 2019-12-13 RX ADMIN — TRAMADOL HYDROCHLORIDE 50 MILLIGRAM(S): 50 TABLET ORAL at 01:00

## 2019-12-13 RX ADMIN — PANTOPRAZOLE SODIUM 40 MILLIGRAM(S): 20 TABLET, DELAYED RELEASE ORAL at 06:26

## 2019-12-13 RX ADMIN — Medication 2: at 21:45

## 2019-12-13 RX ADMIN — Medication 1300 MILLIGRAM(S): at 06:26

## 2019-12-13 RX ADMIN — MAGNESIUM OXIDE 400 MG ORAL TABLET 400 MILLIGRAM(S): 241.3 TABLET ORAL at 12:08

## 2019-12-13 RX ADMIN — Medication 5 UNIT(S): at 09:18

## 2019-12-13 RX ADMIN — Medication 1300 MILLIGRAM(S): at 17:36

## 2019-12-13 RX ADMIN — Medication 5 MILLIGRAM(S): at 21:45

## 2019-12-13 RX ADMIN — TACROLIMUS 8 MILLIGRAM(S): 5 CAPSULE ORAL at 08:27

## 2019-12-13 RX ADMIN — INSULIN GLARGINE 50 UNIT(S): 100 INJECTION, SOLUTION SUBCUTANEOUS at 09:04

## 2019-12-13 RX ADMIN — Medication 2: at 17:35

## 2019-12-13 RX ADMIN — Medication 5 MILLIGRAM(S): at 06:26

## 2019-12-13 RX ADMIN — Medication 100 MILLIGRAM(S): at 11:10

## 2019-12-13 NOTE — PROGRESS NOTE ADULT - SUBJECTIVE AND OBJECTIVE BOX
Transplant Surgery - Multidisciplinary Rounds  --------------------------------------------------------------  DDRT   Date:   2/1/19   Re-admitted with rising creatinine     Present:   Patient seen with multidisciplinary team including Transplant Surgeon: Dr. Laws, Dr. Herbert, Dr. Camargo. Dr. Franklin,  Nephrologist: Dr. Urbano, Dr. Waggoner, Pharmacist: Jesus Barnett, RUTH Velásquez, RAFITA Garg, [facility],  Surgical resident Lucas Rucker, PGY3, and unit RN during am rounds and and examined by Dr. Laws. Disciplines not in attendance will be notified of the plan.      HPI: 74M pmhx of ESRD s/p DDRT on 2/1/19, DM, obesity s/p R-en-Y gastric bypass (2005 w/ Dr. Mckeon, lost ~600lbs), GODFREY, hypothyroidism, GI bleed in 2016 from a marginal ulcer (according to paper documentation from [facility]) which required resection and revision (Dr. Mckeon), and a subsequent GI bleed that was temporized by endoscopy.     Admitted with rising creatinine (creatinine was recently noted to be 2.5, from a baseline of ~1.2). On admission  hyperkalemic with K 6.2, treated with Lokelma, I/D50.   Last renal us on 12/11 with good flow    Interval Events: no overnight events. Feeling well, VSS. K 4.2 this am    Potential Discharge date: pending clinical stability     Education:  Medications    Plan of care:  See Below    MEDICATIONS  (STANDING):  calcium carbonate    500 mG (Tums) Chewable 1 Tablet(s) Chew daily  dextrose 5%. 1000 milliLiter(s) (50 mL/Hr) IV Continuous <Continuous>  dextrose 50% Injectable 12.5 Gram(s) IV Push once  dextrose 50% Injectable 25 Gram(s) IV Push once  dextrose 50% Injectable 25 Gram(s) IV Push once  insulin glargine Injectable (LANTUS) 50 Unit(s) SubCutaneous every morning  insulin lispro (HumaLOG) corrective regimen sliding scale   SubCutaneous three times a day before meals  insulin lispro (HumaLOG) corrective regimen sliding scale   SubCutaneous at bedtime  insulin lispro Injectable (HumaLOG) 5 Unit(s) SubCutaneous before breakfast  insulin lispro Injectable (HumaLOG) 5 Unit(s) SubCutaneous before lunch  insulin lispro Injectable (HumaLOG) 5 Unit(s) SubCutaneous before dinner  levothyroxine 225 MICROGram(s) Oral daily  magnesium oxide 400 milliGRAM(s) Oral daily  melatonin 5 milliGRAM(s) Oral at bedtime  mycophenolic acid  milliGRAM(s) Oral two times a day  pantoprazole    Tablet 40 milliGRAM(s) Oral before breakfast  sodium bicarbonate 1300 milliGRAM(s) Oral two times a day    MEDICATIONS  (PRN):  acetaminophen   Tablet .. 650 milliGRAM(s) Oral every 6 hours PRN Mild Pain (1 - 3), Moderate Pain (4 - 6)  dextrose 40% Gel 15 Gram(s) Oral once PRN Blood Glucose LESS THAN 70 milliGRAM(s)/deciliter  glucagon  Injectable 1 milliGRAM(s) IntraMuscular once PRN Glucose LESS THAN 70 milligrams/deciliter  traMADol 50 milliGRAM(s) Oral every 6 hours PRN moderate to severe pain      PAST MEDICAL & SURGICAL HISTORY:  Marginal ulcer  DM2 (diabetes mellitus, type 2)  Morbid obesity  Hypothyroidism  ESRD on peritoneal dialysis  HLD (hyperlipidemia)  History of partial gastrectomy  History of cholecystectomy  History of Porsha-en-Y gastric bypass      Vital Signs Last 24 Hrs  T(C): 36.5 (13 Dec 2019 09:00), Max: 36.9 (12 Dec 2019 21:00)  T(F): 97.7 (13 Dec 2019 09:00), Max: 98.4 (12 Dec 2019 21:00)  HR: 82 (13 Dec 2019 09:00) (73 - 88)  BP: 134/71 (13 Dec 2019 09:00) (110/63 - 152/88)  BP(mean): --  RR: 20 (13 Dec 2019 09:00) (18 - 20)  SpO2: 93% (13 Dec 2019 09:00) (93% - 98%)    I&O's Summary    12 Dec 2019 07:01  -  13 Dec 2019 07:00  --------------------------------------------------------  IN: 1070 mL / OUT: 1235 mL / NET: -165 mL    13 Dec 2019 07:01  -  13 Dec 2019 12:22  --------------------------------------------------------  IN: 240 mL / OUT: 250 mL / NET: -10 mL                              10.8   9.42  )-----------( 203      ( 13 Dec 2019 06:00 )             35.2     12-13    139  |  105  |  45<H>  ----------------------------<  81  4.2   |  21<L>  |  2.40<H>    Ca    8.0<L>      13 Dec 2019 06:00  Phos  4.1     12-13  Mg     1.9     12-13    TPro  7.8  /  Alb  3.9  /  TBili  0.3  /  DBili  x   /  AST  44<H>  /  ALT  16  /  AlkPhos  115  12-12    Tacrolimus (), Serum: 11.4 ng/mL (12-13 @ 07:15)    Review of systems  Gen: No weight changes, fatigue, fevers/chills, weakness  Skin: No rashes  Head/Eyes/Ears/Mouth: No headache; Normal hearing; Normal vision w/o blurriness; No sinus pain/discomfort, sore throat  Respiratory: No dyspnea, cough, wheezing, hemoptysis  CV: No chest pain, PND, orthopnea  GI: C/O mild abdominal pain at surgical site, diarrhea, constipation, nausea, vomiting, melena, hematochezia  : No increased frequency, dysuria, hematuria, nocturia  MSK: No joint pain/swelling; no back pain; no edema  Neuro: No dizziness/lightheadedness, weakness, seizures, numbness, tingling  Heme: No easy bruising or bleeding  Endo: No heat/cold intolerance  Psych: No significant nervousness, anxiety, stress, depression  All other systems were reviewed and are negative, except as noted.      PHYSICAL EXAM:  Constitutional: Well developed / well nourished  Eyes: Anicteric, PERRLA  ENMT: nc/at  Neck: supple, no JVD  Respiratory: CTA B/L  Cardiovascular: RRR  Gastrointestinal: Soft, non tender, non distended  Genitourinary: Voiding spontaneously  Extremities: SCD's in place and working bilaterally  Vascular: Palpable dp pulses bilaterally  Neurological: A&O x3  Skin: no rashes   Musculoskeletal: Moving all extremities  Psychiatric: Responsive

## 2019-12-13 NOTE — PROGRESS NOTE ADULT - SUBJECTIVE AND OBJECTIVE BOX
[facility] DIVISION OF KIDNEY DISEASES AND HYPERTENSION -- FOLLOW UP NOTE  --------------------------------------------------------------------------------  Chief Complaint: Rising SCr    24 hour events/subjective: Patient evaluated at bedside, in no acute distress. Denies any complaints. Scr improved since discontinuing diuresis and giving IVF.     PAST HISTORY  --------------------------------------------------------------------------------  No significant changes to PMH, PSH, FHx, SHx, unless otherwise noted    ALLERGIES & MEDICATIONS  --------------------------------------------------------------------------------  Allergies    Lipitor (Hepatotoxicity)    Intolerances    Standing Inpatient Medications  calcium carbonate    500 mG (Tums) Chewable 1 Tablet(s) Chew daily  dextrose 5%. 1000 milliLiter(s) IV Continuous <Continuous>  dextrose 50% Injectable 12.5 Gram(s) IV Push once  dextrose 50% Injectable 25 Gram(s) IV Push once  dextrose 50% Injectable 25 Gram(s) IV Push once  insulin glargine Injectable (LANTUS) 50 Unit(s) SubCutaneous every morning  insulin lispro (HumaLOG) corrective regimen sliding scale   SubCutaneous three times a day before meals  insulin lispro (HumaLOG) corrective regimen sliding scale   SubCutaneous at bedtime  insulin lispro Injectable (HumaLOG) 5 Unit(s) SubCutaneous before breakfast  insulin lispro Injectable (HumaLOG) 5 Unit(s) SubCutaneous before lunch  insulin lispro Injectable (HumaLOG) 5 Unit(s) SubCutaneous before dinner  insulin NPH human recombinant 10 Unit(s) SubCutaneous once  levothyroxine 225 MICROGram(s) Oral daily  magnesium oxide 400 milliGRAM(s) Oral daily  melatonin 5 milliGRAM(s) Oral at bedtime  methylPREDNISolone sodium succinate IVPB 250 milliGRAM(s) IV Intermittent once  mycophenolic acid  milliGRAM(s) Oral two times a day  pantoprazole    Tablet 40 milliGRAM(s) Oral before breakfast  sodium bicarbonate 1300 milliGRAM(s) Oral two times a day  tacrolimus ER Tablet (ENVARSUS XR) 9 milliGRAM(s) Oral daily    REVIEW OF SYSTEMS  --------------------------------------------------------------------------------  Gen: No fatigue, fevers/chills, weakness  Skin: No rashes  Head/Eyes/Ears/Mouth: No headache;No sore throat  Respiratory: No dyspnea, cough,   CV: No chest pain, PND, orthopnea  GI: No abdominal pain, diarrhea, constipation, nausea, vomiting  Transplant: No pain  : No increased frequency, dysuria, hematuria, nocturia  MSK: No joint pain/swelling; no back pain; no edema  Neuro: No dizziness/lightheadedness, weakness, seizures, numbness, tingling  Psych: No significant nervousness, anxiety, stress, depression    All other systems were reviewed and are negative, except as noted.    VITALS/PHYSICAL EXAM  --------------------------------------------------------------------------------  T(C): 36.9 (12-13-19 @ 05:00), Max: 36.9 (12-12-19 @ 21:00)  HR: 73 (12-13-19 @ 05:00) (73 - 88)  BP: 110/63 (12-13-19 @ 05:00) (110/63 - 152/88)  RR: 18 (12-13-19 @ 05:00) (18 - 18)  SpO2: 95% (12-13-19 @ 05:00) (95% - 98%)  Wt(kg): --  Height (cm): 180.3 (12-12-19 @ 16:15)  Weight (kg): 120 (12-12-19 @ 16:15)  BMI (kg/m2): 36.9 (12-12-19 @ 16:15)  BSA (m2): 2.37 (12-12-19 @ 16:15)    12-12-19 @ 07:01  -  12-13-19 @ 07:00  --------------------------------------------------------  IN: 1070 mL / OUT: 1235 mL / NET: -165 mL    12-13-19 @ 07:01  -  12-13-19 @ 10:48  --------------------------------------------------------  IN: 0 mL / OUT: 250 mL / NET: -250 mL    Physical Exam:  	Gen: NAD, well-appearing  	HEENT: PERRL, supple neck, clear oropharynx  	Pulm: CTA B/L  	CV: RRR, S1S2; no rub  	Back: No spinal or CVA tenderness; no sacral edema  	Abd: +BS, soft, nontender/nondistended              Transplant: No tenderness, swelling  	: No suprapubic tenderness  	UE: Warm, FROM, intact strength; no edema; no asterixis  	LE: Warm, FROM, intact strength; no edema  	Neuro: No focal deficits, intact gait  	Psych: Normal affect and mood  	Skin: Warm, without rashes    LABS/STUDIES  --------------------------------------------------------------------------------              10.8   9.42  >-----------<  203      [12-13-19 @ 06:00]              35.2     139  |  105  |  45  ----------------------------<  81      [12-13-19 @ 06:00]  4.2   |  21  |  2.40        Ca     8.0     [12-13-19 @ 06:00]      iCa    1.12     [12-12 @ 22:41]      Mg     1.9     [12-13-19 @ 06:00]      Phos  4.1     [12-13-19 @ 06:00]    TPro  7.8  /  Alb  3.9  /  TBili  0.3  /  DBili  x   /  AST  44  /  ALT  16  /  AlkPhos  115  [12-12-19 @ 16:47]    PT/INR: PT 11.3 , INR 0.98       [12-12-19 @ 16:47]  PTT: 31.1       [12-12-19 @ 16:47]      Creatinine Trend:  SCr 2.40 [12-13 @ 06:00]  SCr 2.58 [12-12 @ 22:41]  SCr 2.67 [12-12 @ 18:03]  SCr 2.53 [12-12 @ 16:47]    Tacrolimus (), Serum: 11.4 ng/mL (12-13 @ 07:15)

## 2019-12-13 NOTE — PROGRESS NOTE ADULT - ASSESSMENT
Patient with multifactorial ABELARDO in the setting of DDRT, concern for possible rejection.    Kidney Donor  CPRA: 0%   UNOS Donor ID: [id number]  Match: [id number]  OPO:  NYRT Live on [place]  Donor Age: 59  ABO:  O  KDPI: 81%  COD: anoxia – drug intoxication  X Clamp Time : 01/31/2019 16:32  Medical Hx: Asthma, Depression, drug use (cocaine), ETOH  Terminal Scr ~2.8-3.0  CMV- /EBV +

## 2019-12-13 NOTE — PROGRESS NOTE ADULT - ASSESSMENT
74M pmhx of ESRD s/p DDRT on 2/1/19, DM, obesity s/p R-en-Y gastric bypass (2005 w/ Dr. Mckeon, lost ~600lbs), GODFREY, hypothyroidism, GI bleed in 2016 from a marginal ulcer (according to paper documentation from [facility]) which required resection and revision (Dr. Mckeon), and a subsequent GI bleed that was temporized by endoscopy. Admitted with rising creatinine     [] s/p DDRT, re-admitted with rising creat   - Send labs for DSA  - Solumedrol 250mg IV daily x 3 doses (NPH given with today's dose)   - Immuno: Increase Myfortic 720mg bid, Envarsus 8mg daily, solumerol/pred taper  - Proph: Restart valcyte/nystatin, hold bactrim in stetting of hyperkalemia  - Dc lasix, dc IVF   - dc Lokelma    [] DM  - check HBA1c   - Cont Lantus 50u qAM and Lispro 5u with meals  - NPH 10u given with Solumedrol dose today

## 2019-12-13 NOTE — PROGRESS NOTE ADULT - PROBLEM SELECTOR PLAN 4
Pt. with hyperkalemia in setting of ABELARDO, Bactrim use, and hyperglycemia. Serum potassium upon admission was 6.1. Hyperkalemia treated with Insulin 10 units and Lokelma 10 mg. Repeat serum potassium is WNL. Bicarbonate also noted to be low, started sodium bicarbonate 1300 mg BID. Monitor CBGs, goal CBGs < 180 mg/dl. Low potassium diet advised. No indication for HD. Monitor serum potassium.

## 2019-12-13 NOTE — PROGRESS NOTE ADULT - PROBLEM SELECTOR PLAN 1
Patient with ABELARDO in the setting of DDRT on 2/1/2019 complicated by DGF. Kwesi ORTIZ reviewed, Scr baseline is ~1.5. Scr was 1.52 on 10/25/2019, repeat on 11/26/2019 was 1.89, and then repeat on 12/11/2019 was 2.51. Urine lytes consistent with pre-renal disease. Scr has improved to 2.40 since giving IVF and discontinuing diuretics. Encourage PO intake. Would give pulse steroids 250 mg x 3 in case of ACR. Please send DSA. Check US Transplant. Monitor Scr, monitor urine output. Avoid potential nephrotoxins.

## 2019-12-13 NOTE — PROGRESS NOTE ADULT - PROBLEM SELECTOR PLAN 2
Patient s/p DDRT on 2/1/2019 complicated by DGF. Patient on  mg BID, Prednisone 5 mg, and Envarsus 9 mg daily. Progaf level slightly above goal. Resume Envarsus 9 mg and increase MMF to 720 BID. Goal Prograf is 8-10. Continue Prednisone. Resume Bactrim as serum potassium is WNL. Monitor Scr.

## 2019-12-14 LAB
ANION GAP SERPL CALC-SCNC: 16 MMOL/L — SIGNIFICANT CHANGE UP (ref 5–17)
ANION GAP SERPL CALC-SCNC: 17 MMOL/L — SIGNIFICANT CHANGE UP (ref 5–17)
ANION GAP SERPL CALC-SCNC: 17 MMOL/L — SIGNIFICANT CHANGE UP (ref 5–17)
BUN SERPL-MCNC: 47 MG/DL — HIGH (ref 7–23)
BUN SERPL-MCNC: 48 MG/DL — HIGH (ref 7–23)
BUN SERPL-MCNC: 50 MG/DL — HIGH (ref 7–23)
CALCIUM SERPL-MCNC: 8.2 MG/DL — LOW (ref 8.4–10.5)
CALCIUM SERPL-MCNC: 8.6 MG/DL — SIGNIFICANT CHANGE UP (ref 8.4–10.5)
CALCIUM SERPL-MCNC: 8.6 MG/DL — SIGNIFICANT CHANGE UP (ref 8.4–10.5)
CHLORIDE SERPL-SCNC: 102 MMOL/L — SIGNIFICANT CHANGE UP (ref 96–108)
CHLORIDE SERPL-SCNC: 102 MMOL/L — SIGNIFICANT CHANGE UP (ref 96–108)
CHLORIDE SERPL-SCNC: 103 MMOL/L — SIGNIFICANT CHANGE UP (ref 96–108)
CO2 SERPL-SCNC: 14 MMOL/L — LOW (ref 22–31)
CO2 SERPL-SCNC: 21 MMOL/L — LOW (ref 22–31)
CO2 SERPL-SCNC: 21 MMOL/L — LOW (ref 22–31)
CREAT SERPL-MCNC: 2.11 MG/DL — HIGH (ref 0.5–1.3)
CREAT SERPL-MCNC: 2.15 MG/DL — HIGH (ref 0.5–1.3)
CREAT SERPL-MCNC: 2.24 MG/DL — HIGH (ref 0.5–1.3)
GLUCOSE BLDC GLUCOMTR-MCNC: 161 MG/DL — HIGH (ref 70–99)
GLUCOSE BLDC GLUCOMTR-MCNC: 187 MG/DL — HIGH (ref 70–99)
GLUCOSE BLDC GLUCOMTR-MCNC: 200 MG/DL — HIGH (ref 70–99)
GLUCOSE BLDC GLUCOMTR-MCNC: 231 MG/DL — HIGH (ref 70–99)
GLUCOSE BLDC GLUCOMTR-MCNC: 248 MG/DL — HIGH (ref 70–99)
GLUCOSE BLDC GLUCOMTR-MCNC: 251 MG/DL — HIGH (ref 70–99)
GLUCOSE SERPL-MCNC: 168 MG/DL — HIGH (ref 70–99)
GLUCOSE SERPL-MCNC: 198 MG/DL — HIGH (ref 70–99)
GLUCOSE SERPL-MCNC: 329 MG/DL — HIGH (ref 70–99)
HBA1C BLD-MCNC: 6.3 % — HIGH (ref 4–5.6)
HCT VFR BLD CALC: 35.9 % — LOW (ref 39–50)
HGB BLD-MCNC: 10.9 G/DL — LOW (ref 13–17)
MAGNESIUM SERPL-MCNC: 2.2 MG/DL — SIGNIFICANT CHANGE UP (ref 1.6–2.6)
MCHC RBC-ENTMCNC: 30 PG — SIGNIFICANT CHANGE UP (ref 27–34)
MCHC RBC-ENTMCNC: 30.4 GM/DL — LOW (ref 32–36)
MCV RBC AUTO: 98.9 FL — SIGNIFICANT CHANGE UP (ref 80–100)
NRBC # BLD: 0 /100 WBCS — SIGNIFICANT CHANGE UP (ref 0–0)
PHOSPHATE SERPL-MCNC: 3.2 MG/DL — SIGNIFICANT CHANGE UP (ref 2.5–4.5)
PLATELET # BLD AUTO: 237 K/UL — SIGNIFICANT CHANGE UP (ref 150–400)
POTASSIUM SERPL-MCNC: 5 MMOL/L — SIGNIFICANT CHANGE UP (ref 3.5–5.3)
POTASSIUM SERPL-MCNC: 5.6 MMOL/L — HIGH (ref 3.5–5.3)
POTASSIUM SERPL-MCNC: 5.7 MMOL/L — HIGH (ref 3.5–5.3)
POTASSIUM SERPL-SCNC: 5 MMOL/L — SIGNIFICANT CHANGE UP (ref 3.5–5.3)
POTASSIUM SERPL-SCNC: 5.6 MMOL/L — HIGH (ref 3.5–5.3)
POTASSIUM SERPL-SCNC: 5.7 MMOL/L — HIGH (ref 3.5–5.3)
RBC # BLD: 3.63 M/UL — LOW (ref 4.2–5.8)
RBC # FLD: 12.5 % — SIGNIFICANT CHANGE UP (ref 10.3–14.5)
SODIUM SERPL-SCNC: 134 MMOL/L — LOW (ref 135–145)
SODIUM SERPL-SCNC: 139 MMOL/L — SIGNIFICANT CHANGE UP (ref 135–145)
SODIUM SERPL-SCNC: 140 MMOL/L — SIGNIFICANT CHANGE UP (ref 135–145)
TACROLIMUS SERPL-MCNC: 12 NG/ML — SIGNIFICANT CHANGE UP
WBC # BLD: 10.87 K/UL — HIGH (ref 3.8–10.5)
WBC # FLD AUTO: 10.87 K/UL — HIGH (ref 3.8–10.5)

## 2019-12-14 PROCEDURE — 99232 SBSQ HOSP IP/OBS MODERATE 35: CPT

## 2019-12-14 RX ORDER — FUROSEMIDE 40 MG
40 TABLET ORAL ONCE
Refills: 0 | Status: COMPLETED | OUTPATIENT
Start: 2019-12-14 | End: 2019-12-14

## 2019-12-14 RX ORDER — DEXTROSE 50 % IN WATER 50 %
50 SYRINGE (ML) INTRAVENOUS ONCE
Refills: 0 | Status: COMPLETED | OUTPATIENT
Start: 2019-12-14 | End: 2019-12-14

## 2019-12-14 RX ORDER — INSULIN HUMAN 100 [IU]/ML
10 INJECTION, SOLUTION SUBCUTANEOUS ONCE
Refills: 0 | Status: COMPLETED | OUTPATIENT
Start: 2019-12-14 | End: 2019-12-14

## 2019-12-14 RX ORDER — SODIUM ZIRCONIUM CYCLOSILICATE 10 G/10G
10 POWDER, FOR SUSPENSION ORAL DAILY
Refills: 0 | Status: DISCONTINUED | OUTPATIENT
Start: 2019-12-14 | End: 2019-12-16

## 2019-12-14 RX ADMIN — Medication 50 MILLILITER(S): at 10:42

## 2019-12-14 RX ADMIN — Medication 1300 MILLIGRAM(S): at 17:10

## 2019-12-14 RX ADMIN — Medication 5 UNIT(S): at 17:09

## 2019-12-14 RX ADMIN — Medication 500000 UNIT(S): at 06:08

## 2019-12-14 RX ADMIN — MAGNESIUM OXIDE 400 MG ORAL TABLET 400 MILLIGRAM(S): 241.3 TABLET ORAL at 11:26

## 2019-12-14 RX ADMIN — Medication 500000 UNIT(S): at 23:22

## 2019-12-14 RX ADMIN — Medication 500000 UNIT(S): at 00:00

## 2019-12-14 RX ADMIN — Medication 100 MILLIGRAM(S): at 06:06

## 2019-12-14 RX ADMIN — TACROLIMUS 8 MILLIGRAM(S): 5 CAPSULE ORAL at 08:06

## 2019-12-14 RX ADMIN — INSULIN HUMAN 10 UNIT(S): 100 INJECTION, SOLUTION SUBCUTANEOUS at 10:38

## 2019-12-14 RX ADMIN — INSULIN GLARGINE 50 UNIT(S): 100 INJECTION, SOLUTION SUBCUTANEOUS at 08:11

## 2019-12-14 RX ADMIN — Medication 6: at 17:07

## 2019-12-14 RX ADMIN — Medication 5 MILLIGRAM(S): at 23:22

## 2019-12-14 RX ADMIN — Medication 500000 UNIT(S): at 11:26

## 2019-12-14 RX ADMIN — MYCOPHENOLIC ACID 720 MILLIGRAM(S): 180 TABLET, DELAYED RELEASE ORAL at 17:10

## 2019-12-14 RX ADMIN — Medication 2: at 08:07

## 2019-12-14 RX ADMIN — SODIUM ZIRCONIUM CYCLOSILICATE 10 GRAM(S): 10 POWDER, FOR SUSPENSION ORAL at 18:06

## 2019-12-14 RX ADMIN — PANTOPRAZOLE SODIUM 40 MILLIGRAM(S): 20 TABLET, DELAYED RELEASE ORAL at 06:07

## 2019-12-14 RX ADMIN — Medication 2: at 12:10

## 2019-12-14 RX ADMIN — Medication 500000 UNIT(S): at 17:09

## 2019-12-14 RX ADMIN — Medication 225 MICROGRAM(S): at 06:06

## 2019-12-14 RX ADMIN — Medication 1300 MILLIGRAM(S): at 06:07

## 2019-12-14 RX ADMIN — Medication 5 UNIT(S): at 08:06

## 2019-12-14 RX ADMIN — Medication 40 MILLIGRAM(S): at 18:43

## 2019-12-14 RX ADMIN — MYCOPHENOLIC ACID 720 MILLIGRAM(S): 180 TABLET, DELAYED RELEASE ORAL at 06:11

## 2019-12-14 RX ADMIN — Medication 5 UNIT(S): at 12:11

## 2019-12-14 NOTE — PROGRESS NOTE ADULT - SUBJECTIVE AND OBJECTIVE BOX
Transplant Surgery - Multidisciplinary Rounds  --------------------------------------------------------------  DDRT   Date:   2/1/19   Re-admitted with rising creatinine     Present:     Patient seen and examined with Dr. Omer and RAFITA Garg during am rounds. Disciplines not in attendance will be notified of the plan.    HPI: 74M pmhx of ESRD s/p DDRT on 2/1/19, DM, obesity s/p R-en-Y gastric bypass (2005 w/ Dr. Mckeon, lost ~600lbs), GODFREY, hypothyroidism, GI bleed in 2016 from a marginal ulcer (according to paper documentation from [facility]) which required resection and revision (Dr. Mckeon), and a subsequent GI bleed that was temporized by endoscopy.     Admitted with rising creatinine (creatinine was recently noted to be 2.5, from a baseline of ~1.2). On admission  hyperkalemic with K 6.2, treated with Lokelma, I/D50.   Last renal us on 12/11 with good flow    Interval Events: no overnight events. Feeling well,  - started solumedrol 250mg daily x 3 doses (12/13 - 12/15)  - Hyperkalemic this am (K5.6), tx with I/D50    Potential Discharge date: pending clinical stability     Education:  Medications    Plan of care:  See Below    MEDICATIONS  (STANDING):  calcium carbonate    500 mG (Tums) Chewable 1 Tablet(s) Chew daily  dextrose 5%. 1000 milliLiter(s) (50 mL/Hr) IV Continuous <Continuous>  dextrose 50% Injectable 12.5 Gram(s) IV Push once  dextrose 50% Injectable 25 Gram(s) IV Push once  dextrose 50% Injectable 25 Gram(s) IV Push once  insulin glargine Injectable (LANTUS) 50 Unit(s) SubCutaneous every morning  insulin lispro (HumaLOG) corrective regimen sliding scale   SubCutaneous three times a day before meals  insulin lispro (HumaLOG) corrective regimen sliding scale   SubCutaneous at bedtime  insulin lispro Injectable (HumaLOG) 5 Unit(s) SubCutaneous before breakfast  insulin lispro Injectable (HumaLOG) 5 Unit(s) SubCutaneous before lunch  insulin lispro Injectable (HumaLOG) 5 Unit(s) SubCutaneous before dinner  levothyroxine 225 MICROGram(s) Oral daily  magnesium oxide 400 milliGRAM(s) Oral daily  melatonin 5 milliGRAM(s) Oral at bedtime  methylPREDNISolone sodium succinate IVPB 250 milliGRAM(s) IV Intermittent daily  mycophenolic acid  milliGRAM(s) Oral two times a day  nystatin    Suspension 040952 Unit(s) Oral four times a day  pantoprazole    Tablet 40 milliGRAM(s) Oral before breakfast  sodium bicarbonate 1300 milliGRAM(s) Oral two times a day  tacrolimus ER Tablet (ENVARSUS XR) 8 milliGRAM(s) Oral <User Schedule>  valGANciclovir 450 milliGRAM(s) Oral <User Schedule>    MEDICATIONS  (PRN):  acetaminophen   Tablet .. 650 milliGRAM(s) Oral every 6 hours PRN Mild Pain (1 - 3), Moderate Pain (4 - 6)  dextrose 40% Gel 15 Gram(s) Oral once PRN Blood Glucose LESS THAN 70 milliGRAM(s)/deciliter  glucagon  Injectable 1 milliGRAM(s) IntraMuscular once PRN Glucose LESS THAN 70 milligrams/deciliter      PAST MEDICAL & SURGICAL HISTORY:  Marginal ulcer  DM2 (diabetes mellitus, type 2)  Morbid obesity  Hypothyroidism  ESRD on peritoneal dialysis  HLD (hyperlipidemia)  History of partial gastrectomy  History of cholecystectomy  History of Porsha-en-Y gastric bypass      Vital Signs Last 24 Hrs  T(C): 36.8 (14 Dec 2019 09:00), Max: 36.8 (13 Dec 2019 17:00)  T(F): 98.3 (14 Dec 2019 09:00), Max: 98.3 (13 Dec 2019 21:00)  HR: 81 (14 Dec 2019 09:00) (71 - 86)  BP: 115/71 (14 Dec 2019 09:00) (115/71 - 142/63)  BP(mean): --  RR: 18 (14 Dec 2019 09:00) (18 - 18)  SpO2: 96% (14 Dec 2019 09:00) (94% - 96%)    I&O's Summary    13 Dec 2019 07:01  -  14 Dec 2019 07:00  --------------------------------------------------------  IN: 1375 mL / OUT: 1225 mL / NET: 150 mL    14 Dec 2019 07:01  -  14 Dec 2019 11:48  --------------------------------------------------------  IN: 240 mL / OUT: 200 mL / NET: 40 mL                          10.9   10.87 )-----------( 237      ( 14 Dec 2019 06:55 )             35.9     12-14    139  |  102  |  48<H>  ----------------------------<  168<H>  5.6<H>   |  21<L>  |  2.24<H>    Ca    8.6      14 Dec 2019 06:55  Phos  3.2     12-14  Mg     2.2     12-14    TPro  7.8  /  Alb  3.9  /  TBili  0.3  /  DBili  x   /  AST  44<H>  /  ALT  16  /  AlkPhos  115  12-12    Tacrolimus (), Serum: 12.0 ng/mL (12-14 @ 08:39)    Review of systems  Gen: No weight changes, fatigue, fevers/chills, weakness  Skin: No rashes  Head/Eyes/Ears/Mouth: No headache; Normal hearing; Normal vision w/o blurriness; No sinus pain/discomfort, sore throat  Respiratory: No dyspnea, cough, wheezing, hemoptysis  CV: No chest pain, PND, orthopnea  GI: C/O mild abdominal pain at surgical site, diarrhea, constipation, nausea, vomiting, melena, hematochezia  : No increased frequency, dysuria, hematuria, nocturia  MSK: No joint pain/swelling; no back pain; no edema  Neuro: No dizziness/lightheadedness, weakness, seizures, numbness, tingling  Heme: No easy bruising or bleeding  Endo: No heat/cold intolerance  Psych: No significant nervousness, anxiety, stress, depression  All other systems were reviewed and are negative, except as noted.      PHYSICAL EXAM:  Constitutional: Well developed / well nourished  Eyes: Anicteric, PERRLA  ENMT: nc/at  Neck: supple, no JVD  Respiratory: CTA B/L  Cardiovascular: RRR  Gastrointestinal: Soft, non tender, non distended  Genitourinary: Voiding spontaneously  Extremities: SCD's in place and working bilaterally  Vascular: Palpable dp pulses bilaterally  Neurological: A&O x3  Skin: no rashes   Musculoskeletal: Moving all extremities  Psychiatric: Responsive

## 2019-12-14 NOTE — PROGRESS NOTE ADULT - ASSESSMENT
74M pmhx of ESRD s/p DDRT on 2/1/19, DM, obesity s/p R-en-Y gastric bypass (2005 w/ Dr. Mckeon, lost ~600lbs), GODFREY, hypothyroidism, GI bleed in 2016 from a marginal ulcer (according to paper documentation from [facility]) which required resection and revision (Dr. Mckeon), and a subsequent GI bleed that was temporized by endoscopy. Admitted with rising creatinine     [] s/p DDRT, re-admitted with rising creat   - Send labs for DSA (12/13)  - Solumedrol 250mg IV daily x 3 doses (12/13-12/15)   - Trend creat; if no improvement by Monday, may need renal bx  - Immuno: Myfortic 720mg bid, Envarsus 8mg daily, solumerol/pred taper  - Proph: Restart valcyte/nystatin, hold bactrim in stetting of hyperkalemia    [] Hyperkalemia  - Tx with I/D50  - K restricted diet  - Repeat BMP this afternoon     [] DM  - HBA1c 6.3  - Cont Lantus 50u qAM and Lispro 5u with meals

## 2019-12-15 LAB
ANION GAP SERPL CALC-SCNC: 18 MMOL/L — HIGH (ref 5–17)
BUN SERPL-MCNC: 51 MG/DL — HIGH (ref 7–23)
CALCIUM SERPL-MCNC: 8.6 MG/DL — SIGNIFICANT CHANGE UP (ref 8.4–10.5)
CHLORIDE SERPL-SCNC: 104 MMOL/L — SIGNIFICANT CHANGE UP (ref 96–108)
CO2 SERPL-SCNC: 20 MMOL/L — LOW (ref 22–31)
CREAT SERPL-MCNC: 2 MG/DL — HIGH (ref 0.5–1.3)
GLUCOSE BLDC GLUCOMTR-MCNC: 186 MG/DL — HIGH (ref 70–99)
GLUCOSE BLDC GLUCOMTR-MCNC: 283 MG/DL — HIGH (ref 70–99)
GLUCOSE BLDC GLUCOMTR-MCNC: 335 MG/DL — HIGH (ref 70–99)
GLUCOSE BLDC GLUCOMTR-MCNC: 89 MG/DL — SIGNIFICANT CHANGE UP (ref 70–99)
GLUCOSE SERPL-MCNC: 102 MG/DL — HIGH (ref 70–99)
HCT VFR BLD CALC: 34 % — LOW (ref 39–50)
HGB BLD-MCNC: 10.6 G/DL — LOW (ref 13–17)
MAGNESIUM SERPL-MCNC: 2.2 MG/DL — SIGNIFICANT CHANGE UP (ref 1.6–2.6)
MCHC RBC-ENTMCNC: 30.5 PG — SIGNIFICANT CHANGE UP (ref 27–34)
MCHC RBC-ENTMCNC: 31.2 GM/DL — LOW (ref 32–36)
MCV RBC AUTO: 97.7 FL — SIGNIFICANT CHANGE UP (ref 80–100)
NRBC # BLD: 0 /100 WBCS — SIGNIFICANT CHANGE UP (ref 0–0)
PHOSPHATE SERPL-MCNC: 3.9 MG/DL — SIGNIFICANT CHANGE UP (ref 2.5–4.5)
PLATELET # BLD AUTO: 236 K/UL — SIGNIFICANT CHANGE UP (ref 150–400)
POTASSIUM SERPL-MCNC: 4.2 MMOL/L — SIGNIFICANT CHANGE UP (ref 3.5–5.3)
POTASSIUM SERPL-SCNC: 4.2 MMOL/L — SIGNIFICANT CHANGE UP (ref 3.5–5.3)
RBC # BLD: 3.48 M/UL — LOW (ref 4.2–5.8)
RBC # FLD: 12.5 % — SIGNIFICANT CHANGE UP (ref 10.3–14.5)
SODIUM SERPL-SCNC: 142 MMOL/L — SIGNIFICANT CHANGE UP (ref 135–145)
TACROLIMUS SERPL-MCNC: 9.7 NG/ML — SIGNIFICANT CHANGE UP
WBC # BLD: 12.51 K/UL — HIGH (ref 3.8–10.5)
WBC # FLD AUTO: 12.51 K/UL — HIGH (ref 3.8–10.5)

## 2019-12-15 PROCEDURE — 99232 SBSQ HOSP IP/OBS MODERATE 35: CPT

## 2019-12-15 RX ADMIN — Medication 500000 UNIT(S): at 17:00

## 2019-12-15 RX ADMIN — MYCOPHENOLIC ACID 720 MILLIGRAM(S): 180 TABLET, DELAYED RELEASE ORAL at 17:00

## 2019-12-15 RX ADMIN — Medication 500000 UNIT(S): at 05:17

## 2019-12-15 RX ADMIN — Medication 1300 MILLIGRAM(S): at 05:17

## 2019-12-15 RX ADMIN — SODIUM ZIRCONIUM CYCLOSILICATE 10 GRAM(S): 10 POWDER, FOR SUSPENSION ORAL at 11:46

## 2019-12-15 RX ADMIN — Medication 6: at 17:01

## 2019-12-15 RX ADMIN — Medication 500000 UNIT(S): at 11:46

## 2019-12-15 RX ADMIN — PANTOPRAZOLE SODIUM 40 MILLIGRAM(S): 20 TABLET, DELAYED RELEASE ORAL at 05:17

## 2019-12-15 RX ADMIN — INSULIN GLARGINE 50 UNIT(S): 100 INJECTION, SOLUTION SUBCUTANEOUS at 07:42

## 2019-12-15 RX ADMIN — Medication 500000 UNIT(S): at 23:08

## 2019-12-15 RX ADMIN — Medication 1300 MILLIGRAM(S): at 17:00

## 2019-12-15 RX ADMIN — Medication 5 UNIT(S): at 11:54

## 2019-12-15 RX ADMIN — TACROLIMUS 8 MILLIGRAM(S): 5 CAPSULE ORAL at 07:42

## 2019-12-15 RX ADMIN — Medication 8: at 11:55

## 2019-12-15 RX ADMIN — Medication 5 MILLIGRAM(S): at 21:20

## 2019-12-15 RX ADMIN — Medication 100 MILLIGRAM(S): at 05:17

## 2019-12-15 RX ADMIN — Medication 5 UNIT(S): at 17:00

## 2019-12-15 RX ADMIN — MAGNESIUM OXIDE 400 MG ORAL TABLET 400 MILLIGRAM(S): 241.3 TABLET ORAL at 11:46

## 2019-12-15 RX ADMIN — MYCOPHENOLIC ACID 720 MILLIGRAM(S): 180 TABLET, DELAYED RELEASE ORAL at 05:17

## 2019-12-15 RX ADMIN — Medication 225 MICROGRAM(S): at 05:17

## 2019-12-15 NOTE — PROGRESS NOTE ADULT - ASSESSMENT
74M pmhx of ESRD s/p DDRT on 2/1/19, DM, obesity s/p R-en-Y gastric bypass (2005 w/ Dr. Mckeon, lost ~600lbs), GODFREY, hypothyroidism, GI bleed in 2016 from a marginal ulcer (according to paper documentation from [facility]) which required resection and revision (Dr. Mckeon), and a subsequent GI bleed that was temporized by endoscopy. Admitted with rising creatinine     [] s/p DDRT, re-admitted with ABELARDO  - f/u DSA (12/13)  - Solumedrol 250mg IV daily x 3 doses (12/13-12/15)   - Cr trending down, 2 this AM, responding to pulse steroids. Will continue to trend.  - Immuno: Myfortic 720mg bid, Envarsus 8mg daily, solumerol/pred taper  - Proph: Restart valcyte/nystatin, hold bactrim in stetting of hyperkalemia    [] DM  - HBA1c 6.3  - Cont Lantus 50u qAM and Lispro 5u with meals

## 2019-12-15 NOTE — PROGRESS NOTE ADULT - SUBJECTIVE AND OBJECTIVE BOX
Transplant Surgery - Multidisciplinary Rounds  --------------------------------------------------------------  DDRT   Date:   2/1/19   Re-admitted with rising creatinine     Present:     Patient seen and examined with Dr. Omer and RAFITA Henry during am rounds. Disciplines not in attendance will be notified of the plan.    HPI: 74M pmhx of ESRD s/p DDRT on 2/1/19, DM, obesity s/p R-en-Y gastric bypass (2005 w/ Dr. Mckeon, lost ~600lbs), GODFREY, hypothyroidism, GI bleed in 2016 from a marginal ulcer (according to paper documentation from [facility]) which required resection and revision (Dr. Mckeon), and a subsequent GI bleed that was temporized by endoscopy.     Admitted with rising creatinine (creatinine was recently noted to be 2.5, from a baseline of ~1.2). On admission  hyperkalemic with K 6.2, treated with Lokelma, I/D50.   Last renal us on 12/11 with good flow    Interval Events:   - no overnight events. Feeling well,  - started solumedrol 250mg daily x 3 doses (12/13 - 12/15)  - Hyperkalemia has resolved    Potential Discharge date: pending clinical stability     Education:  Medications    Plan of care:  See Below      MEDICATIONS  (STANDING):  calcium carbonate    500 mG (Tums) Chewable 1 Tablet(s) Chew daily  dextrose 5%. 1000 milliLiter(s) (50 mL/Hr) IV Continuous <Continuous>  dextrose 50% Injectable 12.5 Gram(s) IV Push once  dextrose 50% Injectable 25 Gram(s) IV Push once  dextrose 50% Injectable 25 Gram(s) IV Push once  insulin glargine Injectable (LANTUS) 50 Unit(s) SubCutaneous every morning  insulin lispro (HumaLOG) corrective regimen sliding scale   SubCutaneous three times a day before meals  insulin lispro (HumaLOG) corrective regimen sliding scale   SubCutaneous at bedtime  insulin lispro Injectable (HumaLOG) 5 Unit(s) SubCutaneous before breakfast  insulin lispro Injectable (HumaLOG) 5 Unit(s) SubCutaneous before lunch  insulin lispro Injectable (HumaLOG) 5 Unit(s) SubCutaneous before dinner  levothyroxine 225 MICROGram(s) Oral daily  magnesium oxide 400 milliGRAM(s) Oral daily  melatonin 5 milliGRAM(s) Oral at bedtime  mycophenolic acid  milliGRAM(s) Oral two times a day  nystatin    Suspension 028119 Unit(s) Oral four times a day  pantoprazole    Tablet 40 milliGRAM(s) Oral before breakfast  sodium bicarbonate 1300 milliGRAM(s) Oral two times a day  sodium zirconium cyclosilicate 10 Gram(s) Oral daily  tacrolimus ER Tablet (ENVARSUS XR) 8 milliGRAM(s) Oral <User Schedule>  valGANciclovir 450 milliGRAM(s) Oral <User Schedule>    MEDICATIONS  (PRN):  acetaminophen   Tablet .. 650 milliGRAM(s) Oral every 6 hours PRN Mild Pain (1 - 3), Moderate Pain (4 - 6)  dextrose 40% Gel 15 Gram(s) Oral once PRN Blood Glucose LESS THAN 70 milliGRAM(s)/deciliter  glucagon  Injectable 1 milliGRAM(s) IntraMuscular once PRN Glucose LESS THAN 70 milligrams/deciliter      PAST MEDICAL & SURGICAL HISTORY:  Marginal ulcer  DM2 (diabetes mellitus, type 2)  Morbid obesity  Hypothyroidism  ESRD on peritoneal dialysis  HLD (hyperlipidemia)  History of partial gastrectomy  History of cholecystectomy  History of Porsha-en-Y gastric bypass      Vital Signs Last 24 Hrs  T(C): 36.6 (15 Dec 2019 09:19), Max: 37.1 (14 Dec 2019 13:00)  T(F): 97.9 (15 Dec 2019 09:19), Max: 98.7 (14 Dec 2019 13:00)  HR: 90 (15 Dec 2019 09:19) (72 - 90)  BP: 125/65 (15 Dec 2019 09:19) (120/76 - 135/69)  BP(mean): --  RR: 18 (15 Dec 2019 09:19) (18 - 18)  SpO2: 93% (15 Dec 2019 09:19) (93% - 96%)    I&O's Summary    14 Dec 2019 07:01  -  15 Dec 2019 07:00  --------------------------------------------------------  IN: 480 mL / OUT: 2100 mL / NET: -1620 mL    15 Dec 2019 07:01  -  15 Dec 2019 12:26  --------------------------------------------------------  IN: 0 mL / OUT: 200 mL / NET: -200 mL                              10.6   12.51 )-----------( 236      ( 15 Dec 2019 06:21 )             34.0     12-15    142  |  104  |  51<H>  ----------------------------<  102<H>  4.2   |  20<L>  |  2.00<H>    Ca    8.6      15 Dec 2019 06:21  Phos  3.9     12-15  Mg     2.2     12-15      Tacrolimus (), Serum: 9.7 ng/mL (12-15 @ 08:50)                Review of systems  Gen: No weight changes, fatigue, fevers/chills, weakness  Skin: No rashes  Head/Eyes/Ears/Mouth: No headache; Normal hearing; Normal vision w/o blurriness; No sinus pain/discomfort, sore throat  Respiratory: No dyspnea, cough, wheezing, hemoptysis  CV: No chest pain, PND, orthopnea  GI: no abdominal pain at surgical site, no diarrhea, constipation, nausea, vomiting, melena, hematochezia  : No increased frequency, dysuria, hematuria, nocturia  MSK: No joint pain/swelling; no back pain; no edema  Neuro: No dizziness/lightheadedness, weakness, seizures, numbness, tingling  Heme: No easy bruising or bleeding  Endo: No heat/cold intolerance  Psych: No significant nervousness, anxiety, stress, depression  All other systems were reviewed and are negative, except as noted.      PHYSICAL EXAM:  Constitutional: Well developed / well nourished  Eyes: Anicteric, PERRLA  ENMT: nc/at  Neck: supple, no JVD  Respiratory: CTA B/L  Cardiovascular: RRR  Gastrointestinal: Soft, non tender, non distended. well healed incisional scar  Genitourinary: Voiding spontaneously  Extremities: SCD's in place and working bilaterally  Vascular: Palpable dp pulses bilaterally  Neurological: A&O x3  Skin: no rashes   Musculoskeletal: Moving all extremities  Psychiatric: Responsive

## 2019-12-16 ENCOUNTER — TRANSCRIPTION ENCOUNTER (OUTPATIENT)
Age: 74
End: 2019-12-16

## 2019-12-16 ENCOUNTER — INBOUND DOCUMENT (OUTPATIENT)
Age: 74
End: 2019-12-16

## 2019-12-16 VITALS
RESPIRATION RATE: 18 BRPM | HEART RATE: 83 BPM | SYSTOLIC BLOOD PRESSURE: 153 MMHG | TEMPERATURE: 97 F | OXYGEN SATURATION: 95 % | DIASTOLIC BLOOD PRESSURE: 75 MMHG

## 2019-12-16 LAB
ALBUMIN SERPL ELPH-MCNC: 4 G/DL — SIGNIFICANT CHANGE UP (ref 3.3–5)
ALP SERPL-CCNC: 113 U/L — SIGNIFICANT CHANGE UP (ref 40–120)
ALT FLD-CCNC: 20 U/L — SIGNIFICANT CHANGE UP (ref 10–45)
ANION GAP SERPL CALC-SCNC: 16 MMOL/L — SIGNIFICANT CHANGE UP (ref 5–17)
APPEARANCE UR: CLEAR — SIGNIFICANT CHANGE UP
AST SERPL-CCNC: 16 U/L — SIGNIFICANT CHANGE UP (ref 10–40)
BILIRUB SERPL-MCNC: 0.3 MG/DL — SIGNIFICANT CHANGE UP (ref 0.2–1.2)
BILIRUB UR-MCNC: NEGATIVE — SIGNIFICANT CHANGE UP
BUN SERPL-MCNC: 54 MG/DL — HIGH (ref 7–23)
CALCIUM SERPL-MCNC: 8.7 MG/DL — SIGNIFICANT CHANGE UP (ref 8.4–10.5)
CHLORIDE SERPL-SCNC: 103 MMOL/L — SIGNIFICANT CHANGE UP (ref 96–108)
CO2 SERPL-SCNC: 23 MMOL/L — SIGNIFICANT CHANGE UP (ref 22–31)
COLOR SPEC: SIGNIFICANT CHANGE UP
CREAT SERPL-MCNC: 1.88 MG/DL — HIGH (ref 0.5–1.3)
DIFF PNL FLD: NEGATIVE — SIGNIFICANT CHANGE UP
GLUCOSE BLDC GLUCOMTR-MCNC: 142 MG/DL — HIGH (ref 70–99)
GLUCOSE BLDC GLUCOMTR-MCNC: 84 MG/DL — SIGNIFICANT CHANGE UP (ref 70–99)
GLUCOSE SERPL-MCNC: 80 MG/DL — SIGNIFICANT CHANGE UP (ref 70–99)
GLUCOSE UR QL: NEGATIVE — SIGNIFICANT CHANGE UP
HCT VFR BLD CALC: 37.1 % — LOW (ref 39–50)
HGB BLD-MCNC: 11.5 G/DL — LOW (ref 13–17)
KETONES UR-MCNC: NEGATIVE — SIGNIFICANT CHANGE UP
LEUKOCYTE ESTERASE UR-ACNC: NEGATIVE — SIGNIFICANT CHANGE UP
MAGNESIUM SERPL-MCNC: 2.2 MG/DL — SIGNIFICANT CHANGE UP (ref 1.6–2.6)
MCHC RBC-ENTMCNC: 30.4 PG — SIGNIFICANT CHANGE UP (ref 27–34)
MCHC RBC-ENTMCNC: 31 GM/DL — LOW (ref 32–36)
MCV RBC AUTO: 98.1 FL — SIGNIFICANT CHANGE UP (ref 80–100)
NITRITE UR-MCNC: NEGATIVE — SIGNIFICANT CHANGE UP
NRBC # BLD: 0 /100 WBCS — SIGNIFICANT CHANGE UP (ref 0–0)
PH UR: 6 — SIGNIFICANT CHANGE UP (ref 5–8)
PHOSPHATE SERPL-MCNC: 3.7 MG/DL — SIGNIFICANT CHANGE UP (ref 2.5–4.5)
PLATELET # BLD AUTO: 254 K/UL — SIGNIFICANT CHANGE UP (ref 150–400)
POTASSIUM SERPL-MCNC: 4.4 MMOL/L — SIGNIFICANT CHANGE UP (ref 3.5–5.3)
POTASSIUM SERPL-SCNC: 4.4 MMOL/L — SIGNIFICANT CHANGE UP (ref 3.5–5.3)
PROT SERPL-MCNC: 7.3 G/DL — SIGNIFICANT CHANGE UP (ref 6–8.3)
PROT UR-MCNC: SIGNIFICANT CHANGE UP
RBC # BLD: 3.78 M/UL — LOW (ref 4.2–5.8)
RBC # FLD: 12.6 % — SIGNIFICANT CHANGE UP (ref 10.3–14.5)
SODIUM SERPL-SCNC: 142 MMOL/L — SIGNIFICANT CHANGE UP (ref 135–145)
SP GR SPEC: 1.02 — SIGNIFICANT CHANGE UP (ref 1.01–1.02)
TACROLIMUS SERPL-MCNC: 14 NG/ML — SIGNIFICANT CHANGE UP
UROBILINOGEN FLD QL: NEGATIVE — SIGNIFICANT CHANGE UP
WBC # BLD: 12.46 K/UL — HIGH (ref 3.8–10.5)
WBC # FLD AUTO: 12.46 K/UL — HIGH (ref 3.8–10.5)

## 2019-12-16 PROCEDURE — 84132 ASSAY OF SERUM POTASSIUM: CPT

## 2019-12-16 PROCEDURE — 80048 BASIC METABOLIC PNL TOTAL CA: CPT

## 2019-12-16 PROCEDURE — 93010 ELECTROCARDIOGRAM REPORT: CPT

## 2019-12-16 PROCEDURE — 87086 URINE CULTURE/COLONY COUNT: CPT

## 2019-12-16 PROCEDURE — 76776 US EXAM K TRANSPL W/DOPPLER: CPT

## 2019-12-16 PROCEDURE — 83735 ASSAY OF MAGNESIUM: CPT

## 2019-12-16 PROCEDURE — 82962 GLUCOSE BLOOD TEST: CPT

## 2019-12-16 PROCEDURE — 93005 ELECTROCARDIOGRAM TRACING: CPT

## 2019-12-16 PROCEDURE — 85610 PROTHROMBIN TIME: CPT

## 2019-12-16 PROCEDURE — 82570 ASSAY OF URINE CREATININE: CPT

## 2019-12-16 PROCEDURE — 85730 THROMBOPLASTIN TIME PARTIAL: CPT

## 2019-12-16 PROCEDURE — 86900 BLOOD TYPING SEROLOGIC ABO: CPT

## 2019-12-16 PROCEDURE — 80053 COMPREHEN METABOLIC PANEL: CPT

## 2019-12-16 PROCEDURE — 81003 URINALYSIS AUTO W/O SCOPE: CPT

## 2019-12-16 PROCEDURE — 86901 BLOOD TYPING SEROLOGIC RH(D): CPT

## 2019-12-16 PROCEDURE — 84540 ASSAY OF URINE/UREA-N: CPT

## 2019-12-16 PROCEDURE — 99238 HOSP IP/OBS DSCHRG MGMT 30/<: CPT

## 2019-12-16 PROCEDURE — 83036 HEMOGLOBIN GLYCOSYLATED A1C: CPT

## 2019-12-16 PROCEDURE — 86850 RBC ANTIBODY SCREEN: CPT

## 2019-12-16 PROCEDURE — 82955 ASSAY OF G6PD ENZYME: CPT

## 2019-12-16 PROCEDURE — 85027 COMPLETE CBC AUTOMATED: CPT

## 2019-12-16 PROCEDURE — 84100 ASSAY OF PHOSPHORUS: CPT

## 2019-12-16 PROCEDURE — 82330 ASSAY OF CALCIUM: CPT

## 2019-12-16 PROCEDURE — 80197 ASSAY OF TACROLIMUS: CPT

## 2019-12-16 PROCEDURE — 84300 ASSAY OF URINE SODIUM: CPT

## 2019-12-16 PROCEDURE — 84133 ASSAY OF URINE POTASSIUM: CPT

## 2019-12-16 PROCEDURE — 99232 SBSQ HOSP IP/OBS MODERATE 35: CPT

## 2019-12-16 RX ORDER — TACROLIMUS 5 MG/1
6 CAPSULE ORAL
Qty: 0 | Refills: 0 | DISCHARGE

## 2019-12-16 RX ORDER — VALGANCICLOVIR 450 MG/1
1 TABLET, FILM COATED ORAL
Qty: 39 | Refills: 3
Start: 2019-12-16 | End: 2020-12-09

## 2019-12-16 RX ORDER — NYSTATIN 500MM UNIT
5 POWDER (EA) MISCELLANEOUS
Qty: 0 | Refills: 0 | DISCHARGE
Start: 2019-12-16

## 2019-12-16 RX ORDER — VALGANCICLOVIR 450 MG/1
1 TABLET, FILM COATED ORAL
Qty: 0 | Refills: 0 | DISCHARGE
Start: 2019-12-16

## 2019-12-16 RX ORDER — FUROSEMIDE 40 MG
40 TABLET ORAL
Qty: 0 | Refills: 0 | DISCHARGE

## 2019-12-16 RX ORDER — LANOLIN ALCOHOL/MO/W.PET/CERES
1 CREAM (GRAM) TOPICAL
Qty: 0 | Refills: 0 | DISCHARGE
Start: 2019-12-16

## 2019-12-16 RX ORDER — TACROLIMUS 5 MG/1
9 CAPSULE ORAL
Qty: 0 | Refills: 0 | DISCHARGE

## 2019-12-16 RX ORDER — NYSTATIN 500MM UNIT
5 POWDER (EA) MISCELLANEOUS
Qty: 1 | Refills: 3
Start: 2019-12-16 | End: 2020-04-13

## 2019-12-16 RX ORDER — INSULIN LISPRO 100/ML
2 VIAL (ML) SUBCUTANEOUS ONCE
Refills: 0 | Status: DISCONTINUED | OUTPATIENT
Start: 2019-12-16 | End: 2019-12-16

## 2019-12-16 RX ADMIN — MAGNESIUM OXIDE 400 MG ORAL TABLET 400 MILLIGRAM(S): 241.3 TABLET ORAL at 12:02

## 2019-12-16 RX ADMIN — INSULIN GLARGINE 50 UNIT(S): 100 INJECTION, SOLUTION SUBCUTANEOUS at 08:27

## 2019-12-16 RX ADMIN — Medication 225 MICROGRAM(S): at 05:58

## 2019-12-16 RX ADMIN — MYCOPHENOLIC ACID 720 MILLIGRAM(S): 180 TABLET, DELAYED RELEASE ORAL at 05:58

## 2019-12-16 RX ADMIN — Medication 500000 UNIT(S): at 05:59

## 2019-12-16 RX ADMIN — PANTOPRAZOLE SODIUM 40 MILLIGRAM(S): 20 TABLET, DELAYED RELEASE ORAL at 05:59

## 2019-12-16 RX ADMIN — Medication 500000 UNIT(S): at 12:01

## 2019-12-16 RX ADMIN — TACROLIMUS 8 MILLIGRAM(S): 5 CAPSULE ORAL at 08:00

## 2019-12-16 RX ADMIN — Medication 5 UNIT(S): at 12:02

## 2019-12-16 RX ADMIN — Medication 1 TABLET(S): at 12:02

## 2019-12-16 RX ADMIN — Medication 1300 MILLIGRAM(S): at 05:59

## 2019-12-16 RX ADMIN — VALGANCICLOVIR 450 MILLIGRAM(S): 450 TABLET, FILM COATED ORAL at 09:54

## 2019-12-16 RX ADMIN — Medication 20 MILLIGRAM(S): at 09:54

## 2019-12-16 NOTE — PROGRESS NOTE ADULT - PROBLEM SELECTOR PLAN 4
Pt. with hyperkalemia in setting of ABELARDO, Bactrim use, and hyperglycemia. On admission serum potassium 6.1.    - Low potassium diet advised

## 2019-12-16 NOTE — PROVIDER CONTACT NOTE (OTHER) - ACTION/TREATMENT ORDERED:
As per NP, okay to give lantus now and will order 2 units pre-meal for pt. Will continue to monitor.

## 2019-12-16 NOTE — DISCHARGE NOTE PROVIDER - NSDCFUADDAPPT_GEN_ALL_CORE_FT
Please follow-up with Dr. Urbano on 12/20/19 with labs at the [facility]plant [facility]. [phone]  Please follow-up with your primary care physician as scheduled

## 2019-12-16 NOTE — PROGRESS NOTE ADULT - SUBJECTIVE AND OBJECTIVE BOX
Transplant Surgery - Multidisciplinary Rounds  --------------------------------------------------------------  DDRT   Date:   2/1/19   Re-admitted with rising creatinine     Present:   Patient seen with multidisciplinary team including ( Transplant Surgeon: Dr. Omer, Dr. Laws, Dr. Herbert, Dr. Camargo. Transplant Nephrologist: Dr. Boyle.  Pharmacist: Jesus Barnett. NP/PAs: Mariana Velásquez and Surgical Resident Kailash Lam during am rounds and examined with Dr. Laws.  Disciplines not in attendance will be notified of the plan.     HPI: 74M pmhx of ESRD s/p DDRT on 2/1/19, DM, obesity s/p R-en-Y gastric bypass (2005 w/ Dr. Mckeon, lost ~600lbs), GODFREY, hypothyroidism, GI bleed in 2016 from a marginal ulcer (according to paper documentation from [facility]) which required resection and revision (Dr. Mckeon), and a subsequent GI bleed that was temporized by endoscopy.     Admitted with rising creatinine (creatinine was recently noted to be 2.5, from a baseline of ~1.2). On admission  hyperkalemic with K 6.2, treated with Lokelma, I/D50.   Last renal us on 12/11 with good flow    Interval Events:   - Completed Solumedrol 250mg daily x 3 doses (12/13 - 12/15)  - SCr 1.88 from 2.0    UOP 1780ml  - K+ 4.4.  Bactrim has been held. On Lokelma 10gm daily  - C/O mild dysuria this morning  - DSA 12/13 negative    Potential Discharge date: today    Education:  Medications    Plan of care:  See Below      MEDICATIONS  (STANDING):  calcium carbonate    500 mG (Tums) Chewable 1 Tablet(s) Chew daily  dextrose 5%. 1000 milliLiter(s) (50 mL/Hr) IV Continuous <Continuous>  dextrose 50% Injectable 12.5 Gram(s) IV Push once  dextrose 50% Injectable 25 Gram(s) IV Push once  dextrose 50% Injectable 25 Gram(s) IV Push once  insulin glargine Injectable (LANTUS) 50 Unit(s) SubCutaneous every morning  insulin lispro (HumaLOG) corrective regimen sliding scale   SubCutaneous three times a day before meals  insulin lispro (HumaLOG) corrective regimen sliding scale   SubCutaneous at bedtime  insulin lispro Injectable (HumaLOG) 5 Unit(s) SubCutaneous before breakfast  insulin lispro Injectable (HumaLOG) 5 Unit(s) SubCutaneous before lunch  insulin lispro Injectable (HumaLOG) 5 Unit(s) SubCutaneous before dinner  insulin lispro Injectable (HumaLOG). 2 Unit(s) SubCutaneous once  levothyroxine 225 MICROGram(s) Oral daily  magnesium oxide 400 milliGRAM(s) Oral daily  melatonin 5 milliGRAM(s) Oral at bedtime  mycophenolic acid  milliGRAM(s) Oral two times a day  nystatin    Suspension 672720 Unit(s) Oral four times a day  pantoprazole    Tablet 40 milliGRAM(s) Oral before breakfast  predniSONE   Tablet 20 milliGRAM(s) Oral once  sodium bicarbonate 1300 milliGRAM(s) Oral two times a day  tacrolimus ER Tablet (ENVARSUS XR) 8 milliGRAM(s) Oral <User Schedule>  trimethoprim   80 mG/sulfamethoxazole 400 mG 1 Tablet(s) Oral <User Schedule>  valGANciclovir 450 milliGRAM(s) Oral <User Schedule>    MEDICATIONS  (PRN):  acetaminophen   Tablet .. 650 milliGRAM(s) Oral every 6 hours PRN Mild Pain (1 - 3), Moderate Pain (4 - 6)  dextrose 40% Gel 15 Gram(s) Oral once PRN Blood Glucose LESS THAN 70 milliGRAM(s)/deciliter  glucagon  Injectable 1 milliGRAM(s) IntraMuscular once PRN Glucose LESS THAN 70 milligrams/deciliter      PAST MEDICAL & SURGICAL HISTORY:  Marginal ulcer  DM2 (diabetes mellitus, type 2)  Morbid obesity  Hypothyroidism  ESRD on peritoneal dialysis  HLD (hyperlipidemia)  History of partial gastrectomy  History of cholecystectomy  History of Porsha-en-Y gastric bypass      Vital Signs Last 24 Hrs  T(C): 36.3 (16 Dec 2019 09:00), Max: 36.7 (15 Dec 2019 21:00)  T(F): 97.4 (16 Dec 2019 09:00), Max: 98.1 (16 Dec 2019 01:00)  HR: 83 (16 Dec 2019 09:00) (75 - 93)  BP: 153/75 (16 Dec 2019 09:00) (122/65 - 153/75)  BP(mean): --  RR: 18 (16 Dec 2019 09:00) (18 - 18)  SpO2: 95% (16 Dec 2019 09:00) (93% - 100%)    I&O's Summary    15 Dec 2019 07:01  -  16 Dec 2019 07:00  --------------------------------------------------------  IN: 890 mL / OUT: 1780 mL / NET: -890 mL    16 Dec 2019 07:01  -  16 Dec 2019 10:40  --------------------------------------------------------  IN: 240 mL / OUT: 275 mL / NET: -35 mL                              11.5   12.46 )-----------( 254      ( 16 Dec 2019 06:01 )             37.1     12-16    142  |  103  |  54<H>  ----------------------------<  80  4.4   |  23  |  1.88<H>    Ca    8.7      16 Dec 2019 06:01  Phos  3.7     12-16  Mg     2.2     12-16    TPro  7.3  /  Alb  4.0  /  TBili  0.3  /  DBili  x   /  AST  16  /  ALT  20  /  AlkPhos  113  12-16    Tacrolimus (), Serum: 14.0 ng/mL (12-16 @ 07:17)      Review of systems  Gen: No weight changes, fatigue, fevers/chills, weakness  Skin: No rashes  Head/Eyes/Ears/Mouth: No headache; Normal hearing; Normal vision w/o blurriness; No sinus pain/discomfort, sore throat  Respiratory: No dyspnea, cough, wheezing, hemoptysis  CV: No chest pain, PND, orthopnea  GI: no abdominal pain at surgical site, no diarrhea, constipation, nausea, vomiting, melena, hematochezia  : No increased frequency, dysuria, hematuria, nocturia  MSK: No joint pain/swelling; no back pain; no edema  Neuro: No dizziness/lightheadedness, weakness, seizures, numbness, tingling  Heme: No easy bruising or bleeding  Endo: No heat/cold intolerance  Psych: No significant nervousness, anxiety, stress, depression  All other systems were reviewed and are negative, except as noted.      PHYSICAL EXAM:  Constitutional: Well developed / well nourished  Eyes: Anicteric, PERRLA  ENMT: nc/at  Neck: supple, no JVD  Respiratory: CTA B/L  Cardiovascular: RRR  Gastrointestinal: Soft, non tender, non distended. well healed incisional scar  Genitourinary: Voiding spontaneously  Extremities: SCD's in place and working bilaterally  Vascular: Palpable dp pulses bilaterally  Neurological: A&O x3  Skin: no rashes   Musculoskeletal: Moving all extremities  Psychiatric: Responsive

## 2019-12-16 NOTE — DISCHARGE NOTE PROVIDER - NSDCMRMEDTOKEN_GEN_ALL_CORE_FT
Calcium 500+D oral tablet, chewable: 1 tab(s) orally once a day  Envarsus XR: 6 milligram(s) orally once a day  magnesium oxide 400 mg (241.3 mg elemental magnesium) oral tablet: 1 tab(s) orally once a day  melatonin 5 mg oral tablet: 1 tab(s) orally once a day (at bedtime)  mycophenolic acid 360 mg oral delayed release tablet: 1 tab(s) orally every 12 hours  NovoLOG FlexPen 100 units/mL injectable solution: 5 unit(s) injectable 3 times a day (before meals)  nystatin 100,000 units/mL oral suspension: 5 milliliter(s) orally 4 times a day  pantoprazole 40 mg oral delayed release tablet: 1 tab(s) orally 2 times a day  sodium bicarbonate 650 mg oral tablet: 2 tab(s) orally 2 times a day  sulfamethoxazole-trimethoprim 400 mg-80 mg oral tablet: 1 tab(s) orally Monday, Wednesday, and Friday  Synthroid: 225 microgram(s) orally once a day  Toujeo SoloStar 300 units/mL subcutaneous solution: 50 unit(s) subcutaneous once a day  valGANciclovir 450 mg oral tablet: 1 tab(s) orally

## 2019-12-16 NOTE — PROGRESS NOTE ADULT - SUBJECTIVE AND OBJECTIVE BOX
[facility] DIVISION OF KIDNEY DISEASES AND HYPERTENSION -- FOLLOW UP NOTE  --------------------------------------------------------------------------------  Chief Complaint:    24 hour events/subjective:  - over weekend completed solumedrol 250 IV (12/13-15) w/ improving SCr and UOP  - overnight no events, vitals wnl, total UOP 1.7L voided (net neg 890ml)  - pt seen and examined at bedside this morning w/o complain  - vitals/lab reviewed, noted for downtrend SCr 2 to 1.88, K 4.4 (held bactrim, on [facility])      PAST HISTORY  --------------------------------------------------------------------------------  No significant changes to PMH, PSH, FHx, SHx, unless otherwise noted    ALLERGIES & MEDICATIONS  --------------------------------------------------------------------------------  Allergies    Lipitor (Hepatotoxicity)    Intolerances      Standing Inpatient Medications  calcium carbonate    500 mG (Tums) Chewable 1 Tablet(s) Chew daily  dextrose 5%. 1000 milliLiter(s) IV Continuous <Continuous>  dextrose 50% Injectable 12.5 Gram(s) IV Push once  dextrose 50% Injectable 25 Gram(s) IV Push once  dextrose 50% Injectable 25 Gram(s) IV Push once  insulin glargine Injectable (LANTUS) 50 Unit(s) SubCutaneous every morning  insulin lispro (HumaLOG) corrective regimen sliding scale   SubCutaneous three times a day before meals  insulin lispro (HumaLOG) corrective regimen sliding scale   SubCutaneous at bedtime  insulin lispro Injectable (HumaLOG) 5 Unit(s) SubCutaneous before breakfast  insulin lispro Injectable (HumaLOG) 5 Unit(s) SubCutaneous before lunch  insulin lispro Injectable (HumaLOG) 5 Unit(s) SubCutaneous before dinner  insulin lispro Injectable (HumaLOG). 2 Unit(s) SubCutaneous once  levothyroxine 225 MICROGram(s) Oral daily  magnesium oxide 400 milliGRAM(s) Oral daily  melatonin 5 milliGRAM(s) Oral at bedtime  mycophenolic acid  milliGRAM(s) Oral two times a day  nystatin    Suspension 612263 Unit(s) Oral four times a day  pantoprazole    Tablet 40 milliGRAM(s) Oral before breakfast  predniSONE   Tablet 20 milliGRAM(s) Oral once  sodium bicarbonate 1300 milliGRAM(s) Oral two times a day  tacrolimus ER Tablet (ENVARSUS XR) 8 milliGRAM(s) Oral <User Schedule>  trimethoprim   80 mG/sulfamethoxazole 400 mG 1 Tablet(s) Oral <User Schedule>  valGANciclovir 450 milliGRAM(s) Oral <User Schedule>    PRN Inpatient Medications  acetaminophen   Tablet .. 650 milliGRAM(s) Oral every 6 hours PRN  dextrose 40% Gel 15 Gram(s) Oral once PRN  glucagon  Injectable 1 milliGRAM(s) IntraMuscular once PRN      REVIEW OF SYSTEMS  --------------------------------------------------------------------------------  Gen: No fatigue, fevers/chills, weakness  Skin: No rashes  Respiratory: No dyspnea, cough  CV: No chest pain  GI: No abdominal pain, diarrhea, constipation, nausea, vomiting  : No increased frequency, hematuria  MSK: No edema  Neuro: No dizziness/lightheadedness    All other systems were reviewed and are negative, except as noted.    VITALS/PHYSICAL EXAM  --------------------------------------------------------------------------------  T(C): 36.3 (12-16-19 @ 09:00), Max: 36.7 (12-15-19 @ 21:00)  HR: 83 (12-16-19 @ 09:00) (75 - 93)  BP: 153/75 (12-16-19 @ 09:00) (122/65 - 153/75)  RR: 18 (12-16-19 @ 09:00) (18 - 18)  SpO2: 95% (12-16-19 @ 09:00) (93% - 100%)  Wt(kg): --        12-15-19 @ 07:01  -  12-16-19 @ 07:00  --------------------------------------------------------  IN: 890 mL / OUT: 1780 mL / NET: -890 mL    12-16-19 @ 07:01  -  12-16-19 @ 11:17  --------------------------------------------------------  IN: 240 mL / OUT: 275 mL / NET: -35 mL      Physical Exam:  	Gen: NAD, well-appearing on room air  	HEENT: MMM, supple neck, clear oropharynx  	Pulm: CTA B/L, no crackles/wheezing  	CV: RRR, S1S2  	Abd: +BS, soft, nondistended, well healing incision  	LE: Warm no edema  	Psych: Normal affect and mood  	Skin: Warm, without rashes  	Vascular access:    LABS/STUDIES  --------------------------------------------------------------------------------              11.5   12.46 >-----------<  254      [12-16-19 @ 06:01]              37.1     142  |  103  |  54  ----------------------------<  80      [12-16-19 @ 06:01]  4.4   |  23  |  1.88        Ca     8.7     [12-16-19 @ 06:01]      Mg     2.2     [12-16-19 @ 06:01]      Phos  3.7     [12-16-19 @ 06:01]    TPro  7.3  /  Alb  4.0  /  TBili  0.3  /  DBili  x   /  AST  16  /  ALT  20  /  AlkPhos  113  [12-16-19 @ 06:01]          Creatinine Trend:  SCr 1.88 [12-16 @ 06:01]  SCr 2.00 [12-15 @ 06:21]  SCr 2.15 [12-14 @ 21:31]  SCr 2.11 [12-14 @ 14:26]  SCr 2.24 [12-14 @ 06:55]      Urine Creatinine 72      [12-12-19 @ 23:14]  Urine Sodium 97      [12-12-19 @ 23:14]  Urine Urea Nitrogen 426      [12-13-19 @ 03:12]  Urine Potassium 19      [12-12-19 @ 23:14]    HbA1c 6.3      [12-14-19 @ 11:01]

## 2019-12-16 NOTE — DISCHARGE NOTE PROVIDER - NSDCCPCAREPLAN_GEN_ALL_CORE_FT
PRINCIPAL DISCHARGE DIAGNOSIS  Diagnosis: ABELARDO (acute kidney injury)  Assessment and Plan of Treatment: you were treated with IV steroids with excellent response.  Return if you notice decreased urine output, high fevers, severe abdominal pain or any other new symptoms

## 2019-12-16 NOTE — PROVIDER CONTACT NOTE (OTHER) - BACKGROUND
Pt admitted for elevated creatinine, and hyperkalemia. PMH of diabetes type 2, kidney transplant, hypothyroidism, ESRD on PD.

## 2019-12-16 NOTE — DISCHARGE NOTE PROVIDER - CARE PROVIDER_API CALL
Priyank Urbano (MD)  Internal Medicine; Nephrology  [address] [place] [address]  Phone: [phone]  Fax: [phone]  Follow Up Time:

## 2019-12-16 NOTE — PROGRESS NOTE ADULT - NSHPATTENDINGPLANDISCUSS_GEN_ALL_CORE
Patient seen on multidisciplinary rounds with Transplant surgeons, nephrologist, NP/PA, pharmacist, and nurse.
Patient seen on multidisciplinary rounds with Transplant surgeons, nephrologist, NP/PA, pharmacist, and nurse.
Transplant team

## 2019-12-16 NOTE — DISCHARGE NOTE PROVIDER - CARE PROVIDERS DIRECT ADDRESSES
[email or web address]

## 2019-12-16 NOTE — DISCHARGE NOTE NURSING/CASE MANAGEMENT/SOCIAL WORK - PATIENT PORTAL LINK FT
You can access the FollowMyHealth Patient Portal offered by [facility] by registering at the following website: [email or web address]. By joining [employer]’s FollowMyHealth portal, you will also be able to view your health information using other applications (apps) compatible with our system.

## 2019-12-16 NOTE — PROGRESS NOTE ADULT - PROBLEM SELECTOR PLAN 2
Patient s/p DDRT on 2/1/2019 complicated by DGF.     - plan for discharge today, outpatient follow up with transplant nephrology/surgery  - discharge meds Envarsus 8mg daily, Myfortic 720mg BID, Pred taper 20mg BID today then 10mg BID thereafter taper.  Continue Valcyte/Nystatin and restart Bactrim.  Goal tacrolimus 8-10 Patient s/p DDRT on 2/1/2019 complicated by DGF.     - plan for discharge today, outpatient follow up with transplant nephrology/surgery  - discharge meds Envarsus 6mg daily, Myfortic 720mg BID, Pred taper 20mg BID today then 10mg BID thereafter taper.  Continue Valcyte/Nystatin and restart Bactrim.  Goal tacrolimus 8-10

## 2019-12-16 NOTE — PROGRESS NOTE ADULT - ASSESSMENT
74M pmhx of ESRD s/p DDRT on 2/1/19, DM, obesity s/p R-en-Y gastric bypass (2005 w/ Dr. Mckeon, lost ~600lbs), GODFREY, hypothyroidism, GI bleed in 2016 from a marginal ulcer (according to paper documentation from [facility]) which required resection and revision (Dr. Mckeon), and a subsequent GI bleed that was temporized by endoscopy. Admitted with rising creatinine now improving s/p 3 days pulse dose steroids.     [] s/p DDRT, re-admitted with ABELARDO  - 12/13 DSA negative   - Completed Solumedrol 250mg IV daily x 3 doses (12/13-12/15)   - Cr trending down.  Will hold off on renal graft biopsy  - Immuno: Myfortic 720mg bid, Envarsus 8mg daily, Pred taper 20mg BID today, then 10mg BID thereafter  - Proph: Valcyte/Nystatin. Restart Bactrim  - DC to home today with FU in clinic end of week with Dr. Urbano    [] DM  - HBA1c 6.3  - Cont Lantus 50u qAM and Lispro 5u with meals

## 2019-12-16 NOTE — PROGRESS NOTE ADULT - PROBLEM SELECTOR PLAN 1
ABELARDO in the setting of DDRT on 2/1/2019 complicated by DGF initially concern for rejection thereby tx solumedrol IV 12-13 to 12/15. Kwesi ORTIZ reviewed, Scr baseline is ~1.5, on 12/11/2019 SCr 2.51. Urine lytes consistent with pre-renal disease, improved w/ IVF and holding diuretics, DSA reported DSA (12/13), hold off renal biopsy given improvement SCr and UOP.    - plan for discharge today, outpatient follow up with transplant nephrology/surgery

## 2019-12-16 NOTE — PROGRESS NOTE ADULT - REASON FOR ADMISSION
Rising creatinine
s/p DDRT
elevated creatinine

## 2019-12-16 NOTE — PROGRESS NOTE ADULT - ATTENDING COMMENTS
Immunosuppression levels reviewed.  Will keep dose at Envarsus 8
Tacro level 12 - on Envarsus 8mg daily, no  change today (decreased from 9mg yesterday)
ABELARDO improving with steroids  Immunosuppression- Decrease envarsus to 6mg due to high level. Transition to prednisone taper. Cont myfortic  d/c home today on prednisone taper, with f/u in office this week
s/p DDRT in February now with ABELARDO, Cr up to 2.6  u/s normal  plan for bolus solumedrol 250mg x3 days. If no improvement, will need biopsy on Monday
Kidney transplant recipient with functioning renal allograft  ABELARDO,r /o rejection  DM with hyperglycemia  Reviewed immunosuppression and allograft function  Plan:  Tacrolimus target 8-10 ng/ml  Full dose MMF  Steroid pulse 250 mg daily X 3 days  F/u creatinine, blood chemistry, FS glucose  [place] follow  I was present during and reviewed clinical and lab data as well as assessment and plan as documented by the housestaff as noted. Please contact if any additional questions with any change in clinical condition or on availability of any additional information or reports.
75 y/o man s/p DDRT in 2/2019 complicated by DGF, he eventually came of dialysis and creatinine improved to lily of 1.5mg/dL.   He was admitted with ABELARDO with scr up to 2.5mg/dl on 12/11/19. He was admitted,  treated with IV steroids for presumed rejection with improvement in renal function. Creatinine down to 1.8 mg/dL today.  Tac level 12-> 9.4-> 14 on Envarsus 8mg daily     Plan  D/c home on Envarsus 6mg daily, /720, Pred 20  F/u in clinic this week

## 2019-12-16 NOTE — PROGRESS NOTE ADULT - ASSESSMENT
Patient with multifactorial ABELARDO in the setting of DDRT, concern for possible rejection tx solumedrol 250 IV 12/13-15 w/ DSA negative    Kidney Donor  CPRA: 0%   UNOS Donor ID: [id number]  Match: [id number]  OPO:  NYRT Live on [place]  Donor Age: 59  ABO:  O  KDPI: 81%  COD: anoxia – drug intoxication  X Clamp Time : 01/31/2019 16:32  Medical Hx: Asthma, Depression, drug use (cocaine), ETOH  Terminal Scr ~2.8-3.0  CMV- /EBV +

## 2019-12-16 NOTE — PROVIDER CONTACT NOTE (OTHER) - ASSESSMENT
Pt A&OX4, V/S as per flowsheet. Pt asymptomatic, no c/o of chest pain. B, Due for 5 units pre-meal humalog and 50 units Lantus, give or hold?

## 2019-12-16 NOTE — DISCHARGE NOTE NURSING/CASE MANAGEMENT/SOCIAL WORK - NSDCFUADDAPPT_GEN_ALL_CORE_FT
Please follow-up with Dr. Urbano on 12/20/19 with labs at the [facility]. [phone]  Please follow-up with your primary care physician as scheduled

## 2019-12-16 NOTE — DISCHARGE NOTE PROVIDER - HOSPITAL COURSE
74M pmhx of ESRD s/p DDRT on 2/1/19 c/b DGF with HD, with good graft function after, DM, obesity s/p R-en-Y gastric bypass (2005 w/ Dr. Mckeon, lost ~600lbs), GODFREY, hypothyroidism, GI bleed in 2016 from a marginal ulcer (according to paper documentation from [facility]) which required resection and revision (Dr. Mckeon), and a subsequent GI bleed that was temporized by endoscopy.         Admitted with rising creatinine (creatinine was recently noted to be 2.5, from a baseline of ~1.2).  Treated for possible rejection with pulse steroids (solumedrol) x3, with excellent response. d/c Cr was 1.88.   He was also found to be hyperkalemic and treated with Lokelma and Bactrim hold.      Last renal us on 12/11 with good flow        He otherwise had no complications and tolerated all treatments well.  He was evaluated daily by our multidisciplinary team. Was discharged safely home on 12/16 with the following plan:        -Envarsus 6    -Pred taper: 20BID on 12/16, 10BID on 12/17 and onwards    -Bactrim TIW, Valcyte TIW    -continue rest of transplant medication regimen as ordered    -f/u with Dr. Urbano on 12/20 with labs

## 2019-12-17 LAB
CULTURE RESULTS: SIGNIFICANT CHANGE UP
SPECIMEN SOURCE: SIGNIFICANT CHANGE UP

## 2019-12-18 LAB — G6PD RBC-CCNC: 18.6 U/G HGB — SIGNIFICANT CHANGE UP (ref 7–20.5)

## 2019-12-19 ENCOUNTER — APPOINTMENT (OUTPATIENT)
Dept: TRANSPLANT | Facility: CLINIC | Age: 74
End: 2019-12-19

## 2019-12-20 LAB
ALBUMIN SERPL ELPH-MCNC: 4.4 G/DL
ALP BLD-CCNC: 121 U/L
ALT SERPL-CCNC: 32 U/L
ANION GAP SERPL CALC-SCNC: 13 MMOL/L
APPEARANCE: CLEAR
AST SERPL-CCNC: 16 U/L
BACTERIA: NEGATIVE
BASOPHILS # BLD AUTO: 0.03 K/UL
BASOPHILS NFR BLD AUTO: 0.2 %
BILIRUB SERPL-MCNC: 0.4 MG/DL
BILIRUBIN URINE: NEGATIVE
BLOOD URINE: ABNORMAL
BUN SERPL-MCNC: 42 MG/DL
CALCIUM SERPL-MCNC: 8.7 MG/DL
CHLORIDE SERPL-SCNC: 105 MMOL/L
CO2 SERPL-SCNC: 23 MMOL/L
COLOR: NORMAL
CREAT SERPL-MCNC: 2.01 MG/DL
CREAT SPEC-SCNC: 67 MG/DL
CREAT/PROT UR: 0.1 RATIO
EOSINOPHIL # BLD AUTO: 0.24 K/UL
EOSINOPHIL NFR BLD AUTO: 1.8 %
GLUCOSE QUALITATIVE U: NEGATIVE
GLUCOSE SERPL-MCNC: 99 MG/DL
HCT VFR BLD CALC: 38.4 %
HGB BLD-MCNC: 11.8 G/DL
HYALINE CASTS: 0 /LPF
IMM GRANULOCYTES NFR BLD AUTO: 0.8 %
KETONES URINE: NEGATIVE
LDH SERPL-CCNC: 215 U/L
LEUKOCYTE ESTERASE URINE: NEGATIVE
LYMPHOCYTES # BLD AUTO: 1.58 K/UL
LYMPHOCYTES NFR BLD AUTO: 11.6 %
MAGNESIUM SERPL-MCNC: 2 MG/DL
MAN DIFF?: NORMAL
MCHC RBC-ENTMCNC: 30.4 PG
MCHC RBC-ENTMCNC: 30.7 GM/DL
MCV RBC AUTO: 99 FL
MICROSCOPIC-UA: NORMAL
MONOCYTES # BLD AUTO: 0.64 K/UL
MONOCYTES NFR BLD AUTO: 4.7 %
NEUTROPHILS # BLD AUTO: 11.03 K/UL
NEUTROPHILS NFR BLD AUTO: 80.9 %
NITRITE URINE: NEGATIVE
PH URINE: 5.5
PHOSPHATE SERPL-MCNC: 4.4 MG/DL
PLATELET # BLD AUTO: 264 K/UL
POTASSIUM SERPL-SCNC: 4.9 MMOL/L
PROT SERPL-MCNC: 7.1 G/DL
PROT UR-MCNC: 10 MG/DL
PROTEIN URINE: NEGATIVE
RBC # BLD: 3.88 M/UL
RBC # FLD: 13 %
RED BLOOD CELLS URINE: 75 /HPF
SODIUM SERPL-SCNC: 141 MMOL/L
SPECIFIC GRAVITY URINE: 1.02
SQUAMOUS EPITHELIAL CELLS: 0 /HPF
TACROLIMUS SERPL-MCNC: 24.2 NG/ML
URATE SERPL-MCNC: 9.1 MG/DL
UROBILINOGEN URINE: NORMAL
WBC # FLD AUTO: 13.63 K/UL
WHITE BLOOD CELLS URINE: 1 /HPF

## 2019-12-24 LAB
BKV DNA SPEC QL NAA+PROBE: NOT DETECTED COPIES/ML
CMV DNA SPEC QL NAA+PROBE: ABNORMAL IU/ML
CMVPCR LOG: ABNORMAL LOG10IU/ML

## 2019-12-26 ENCOUNTER — APPOINTMENT (OUTPATIENT)
Dept: NEPHROLOGY | Facility: CLINIC | Age: 74
End: 2019-12-26
Payer: MEDICARE

## 2019-12-26 VITALS
RESPIRATION RATE: 14 BRPM | HEART RATE: 98 BPM | BODY MASS INDEX: 36.4 KG/M2 | WEIGHT: 260 LBS | HEIGHT: 71 IN | OXYGEN SATURATION: 92 % | DIASTOLIC BLOOD PRESSURE: 72 MMHG | SYSTOLIC BLOOD PRESSURE: 131 MMHG | TEMPERATURE: 97.6 F

## 2019-12-26 PROCEDURE — 99215 OFFICE O/P EST HI 40 MIN: CPT

## 2019-12-26 RX ORDER — METHYLPREDNISOLONE 4 MG/1
4 TABLET ORAL
Qty: 21 | Refills: 0 | Status: DISCONTINUED | COMMUNITY
Start: 2019-06-26

## 2019-12-26 RX ORDER — INSULIN DETEMIR 100 [IU]/ML
100 INJECTION, SOLUTION SUBCUTANEOUS DAILY
Refills: 0 | Status: DISCONTINUED | COMMUNITY
Start: 2019-02-05 | End: 2019-12-26

## 2019-12-26 RX ORDER — OXYCODONE 5 MG/1
5 TABLET ORAL
Qty: 15 | Refills: 0 | Status: DISCONTINUED | COMMUNITY
Start: 2019-06-28

## 2019-12-26 RX ORDER — SEVELAMER CARBONATE 800 MG/1
800 TABLET, FILM COATED ORAL
Qty: 540 | Refills: 0 | Status: DISCONTINUED | COMMUNITY
Start: 2019-08-15

## 2019-12-26 RX ORDER — COLCHICINE 0.6 MG/1
0.6 CAPSULE ORAL
Qty: 30 | Refills: 0 | Status: DISCONTINUED | COMMUNITY
Start: 2019-10-24

## 2019-12-26 RX ORDER — OXYCODONE AND ACETAMINOPHEN 5; 325 MG/1; MG/1
5-325 TABLET ORAL
Qty: 30 | Refills: 0 | Status: DISCONTINUED | COMMUNITY
Start: 2019-07-08

## 2019-12-26 RX ORDER — PREDNISONE 10 MG/1
10 TABLET ORAL
Qty: 60 | Refills: 0 | Status: DISCONTINUED | COMMUNITY
Start: 2019-12-16

## 2019-12-26 RX ORDER — INSULIN GLARGINE 300 U/ML
300 INJECTION, SOLUTION SUBCUTANEOUS
Qty: 14 | Refills: 0 | Status: DISCONTINUED | COMMUNITY
Start: 2019-08-19

## 2019-12-26 RX ORDER — LEVOTHYROXINE SODIUM 0.03 MG/1
25 TABLET ORAL
Qty: 90 | Refills: 0 | Status: DISCONTINUED | COMMUNITY
Start: 2019-09-11

## 2019-12-26 RX ORDER — INSULIN GLARGINE 300 U/ML
300 INJECTION, SOLUTION SUBCUTANEOUS
Refills: 0 | Status: ACTIVE | COMMUNITY
Start: 2019-12-26

## 2019-12-26 RX ORDER — MORPHINE SULFATE 15 MG/1
15 TABLET, FILM COATED, EXTENDED RELEASE ORAL
Qty: 30 | Refills: 0 | Status: DISCONTINUED | COMMUNITY
Start: 2019-12-18

## 2019-12-26 RX ORDER — OXYCODONE AND ACETAMINOPHEN 10; 325 MG/1; MG/1
10-325 TABLET ORAL
Qty: 90 | Refills: 0 | Status: DISCONTINUED | COMMUNITY
Start: 2019-12-18

## 2019-12-26 NOTE — HISTORY OF PRESENT ILLNESS
[FreeTextEntry1] : Here for post discharge follow up. He had a fall a day ago, tripping over a chair, has left pectoral area pain, no wounds/bleeding. H/o rib fractures in the past. \par Has been passing increased amounts of urine and has lost weight.\par Recently treated for presumed rejection, creatinine improved. Still on 20 mg/day prednisone.\par \par \par Admission: 12-Dec-2019\par Discharge: 16-Dec-2019\par \par 74M pmhx of ESRD s/p DDRT on 2/1/19 c/b DGF with HD, with good graft function after, DM, obesity s/p R-en-Y gastric bypass (2005 w/ Dr. Mckeon, lost ~600lbs), GODFREY, hypothyroidism, GI bleed in 2016 from a marginal ulcer (according to paper documentation from [facility]) which required resection and revision (Dr. Mckeon), and a subsequent GI bleed that was temporized by endoscopy. \par \par Admitted with rising creatinine (creatinine was recently noted to be 2.5, from a baseline of ~1.2).  Treated for possible rejection with pulse steroids (solumedrol) x3, with excellent response. d/c Cr was 1.88.   He was also found to be hyperkalemic and treated with Lokelma and Bactrim hold.  \par Last renal us on 12/11 with good flow\par solumedrol 250 IV 12/13-15 w/ DSA negative\par \par He otherwise had no complications and tolerated all treatments well.  He was evaluated daily by our multidisciplinary team. Was discharged safely home on 12/16 with the following plan:\par \par -Envarsus 6/day\par -Pred taper: 20BID on 12/16, 10BID on 12/17 and onwards\par -Bactrim TIW, Valcyte TIW\par \par Creatinine Trend:\par SCr 1.88 [12-16 @ 06:01]\par SCr 2.00 [12-15 @ 06:21]\par SCr 2.15 [12-14 @ 21:31]\par SCr 2.11 [12-14 @ 14:26]\par SCr 2.24 [12-14 @ 06:55]\par

## 2019-12-26 NOTE — PHYSICAL EXAM
[General Appearance - Alert] : alert [General Appearance - In No Acute Distress] : in no acute distress [Extraocular Movements] : extraocular movements were intact [Sclera] : the sclera and conjunctiva were normal [PERRL With Normal Accommodation] : pupils were equal in size, round, and reactive to light [Neck Appearance] : the appearance of the neck was normal [Oropharynx] : the oropharynx was normal [Outer Ear] : the ears and nose were normal in appearance [Jugular Venous Distention Increased] : there was no jugular-venous distention [Neck Cervical Mass (___cm)] : no neck mass was observed [Thyroid Nodule] : there were no palpable thyroid nodules [Thyroid Diffuse Enlargement] : the thyroid was not enlarged [Auscultation Breath Sounds / Voice Sounds] : lungs were clear to auscultation bilaterally [Heart Rate And Rhythm] : heart rate was normal and rhythm regular [Heart Sounds Gallop] : no gallops [Heart Sounds] : normal S1 and S2 [Murmurs] : no murmurs [Heart Sounds Pericardial Friction Rub] : no pericardial rub [Bowel Sounds] : normal bowel sounds [Abdomen Soft] : soft [Abdomen Tenderness] : non-tender [Cervical Lymph Nodes Enlarged Posterior Bilaterally] : posterior cervical [Cervical Lymph Nodes Enlarged Anterior Bilaterally] : anterior cervical [Supraclavicular Lymph Nodes Enlarged Bilaterally] : supraclavicular [Inguinal Lymph Nodes Enlarged Bilaterally] : inguinal [Involuntary Movements] : no involuntary movements were seen [Axillary Lymph Nodes Enlarged Bilaterally] : axillary [FreeTextEntry1] : tenderness over left pectoral area [No Focal Deficits] : no focal deficits [Oriented To Time, Place, And Person] : oriented to person, place, and time [] : no rash [Impaired Insight] : insight and judgment were intact [Affect] : the affect was normal

## 2019-12-26 NOTE — ASSESSMENT
[FreeTextEntry1] : Renal Transplant recipient: Recent treatment for presumed rejection which improved\par Immunosuppression: reviewed; , on tac/MMF/prednisone, last Tac level noted; will recheck Tac level tomorrow. He took meds prior to labs today by mistake; 720 mg/dose MMF and prednisone at 20 mg daily\par If creatinine decreasing will decrease prednisone to 10 mg/day\par Fall, left pectoral pain: Will f/u with primary MD\par Labs in 2 weeks. f/u in 1 month.\par DM: On Insulin.  He will f/u with endocrinologist. Gualberto Salazar\par Will continue to monitor and adjust treatment\par Hyperlipidemia: he was previously on Welchol had elevated LFT and joint/muscle pain with Lipitor. Will follow lipids\par Cardiovascular risk reduction discussed.  \par Peptic ulcer: On protonix\par Left knee arthritis: On f/u with orthopedics surgeon, Dr. Mcclain at [place].\par Infection prophylaxis: On Bactrim, Valcyte as well as GI prophylaxis.\par Discussed again Renal Preservation strategies including achieving optimal weight through calory restriction and regular exercise, daily exercise, sleep hygiene, optimized control of glucose, blood pressure, lipids, avoidance of NSAIDs, Low Na and Low animal protein diet and medication adherence.\par \par

## 2019-12-27 LAB
ALBUMIN SERPL ELPH-MCNC: 4.2 G/DL
ALP BLD-CCNC: 116 U/L
ALT SERPL-CCNC: 31 U/L
ANION GAP SERPL CALC-SCNC: 21 MMOL/L
APPEARANCE: CLEAR
AST SERPL-CCNC: 20 U/L
BACTERIA: NEGATIVE
BASOPHILS # BLD AUTO: 0.06 K/UL
BASOPHILS NFR BLD AUTO: 0.4 %
BILIRUB SERPL-MCNC: 0.3 MG/DL
BILIRUBIN URINE: NEGATIVE
BLOOD URINE: ABNORMAL
BUN SERPL-MCNC: 56 MG/DL
CALCIUM SERPL-MCNC: 8.4 MG/DL
CHLORIDE SERPL-SCNC: 104 MMOL/L
CMV DNA SPEC QL NAA+PROBE: ABNORMAL IU/ML
CMVPCR LOG: ABNORMAL LOG10IU/ML
CO2 SERPL-SCNC: 21 MMOL/L
COLOR: NORMAL
CREAT SERPL-MCNC: 2.45 MG/DL
CREAT SPEC-SCNC: 73 MG/DL
CREAT/PROT UR: 0.1 RATIO
EOSINOPHIL # BLD AUTO: 0.02 K/UL
EOSINOPHIL NFR BLD AUTO: 0.1 %
GLUCOSE QUALITATIVE U: NEGATIVE
GLUCOSE SERPL-MCNC: 157 MG/DL
HCT VFR BLD CALC: 38.8 %
HGB BLD-MCNC: 12 G/DL
HYALINE CASTS: 0 /LPF
IMM GRANULOCYTES NFR BLD AUTO: 1.1 %
KETONES URINE: NEGATIVE
LDH SERPL-CCNC: 237 U/L
LEUKOCYTE ESTERASE URINE: NEGATIVE
LYMPHOCYTES # BLD AUTO: 0.76 K/UL
LYMPHOCYTES NFR BLD AUTO: 5 %
MAGNESIUM SERPL-MCNC: 2 MG/DL
MAN DIFF?: NORMAL
MCHC RBC-ENTMCNC: 30.7 PG
MCHC RBC-ENTMCNC: 30.9 GM/DL
MCV RBC AUTO: 99.2 FL
MICROSCOPIC-UA: NORMAL
MONOCYTES # BLD AUTO: 0.5 K/UL
MONOCYTES NFR BLD AUTO: 3.3 %
NEUTROPHILS # BLD AUTO: 13.72 K/UL
NEUTROPHILS NFR BLD AUTO: 90.1 %
NITRITE URINE: NEGATIVE
PH URINE: 5.5
PHOSPHATE SERPL-MCNC: 4.1 MG/DL
PLATELET # BLD AUTO: 205 K/UL
POTASSIUM SERPL-SCNC: 4.9 MMOL/L
PROT SERPL-MCNC: 7.2 G/DL
PROT UR-MCNC: 9 MG/DL
PROTEIN URINE: NEGATIVE
RBC # BLD: 3.91 M/UL
RBC # FLD: 13.3 %
RED BLOOD CELLS URINE: 15 /HPF
SODIUM SERPL-SCNC: 146 MMOL/L
SPECIFIC GRAVITY URINE: 1.02
SQUAMOUS EPITHELIAL CELLS: 0 /HPF
TACROLIMUS SERPL-MCNC: 8.4 NG/ML
URATE SERPL-MCNC: 11.3 MG/DL
UROBILINOGEN URINE: NORMAL
WBC # FLD AUTO: 15.23 K/UL
WHITE BLOOD CELLS URINE: 1 /HPF

## 2020-01-02 LAB — BKV DNA SPEC QL NAA+PROBE: NOT DETECTED COPIES/ML

## 2020-01-07 ENCOUNTER — LABORATORY RESULT (OUTPATIENT)
Age: 75
End: 2020-01-07

## 2020-01-07 ENCOUNTER — APPOINTMENT (OUTPATIENT)
Dept: TRANSPLANT | Facility: CLINIC | Age: 75
End: 2020-01-07

## 2020-01-24 ENCOUNTER — OTHER (OUTPATIENT)
Age: 75
End: 2020-01-24

## 2020-02-04 LAB
HBV SURFACE AG SER QL: NONREACTIVE
HCV AB SER QL: NONREACTIVE
HCV S/CO RATIO: 0.18 S/CO

## 2020-02-05 LAB
HIV1 RNA # SERPL NAA+PROBE: NORMAL
HIV1 RNA # SERPL NAA+PROBE: NORMAL COPIES/ML
HIV1+2 AB SPEC QL IA.RAPID: NONREACTIVE
VIRAL LOAD INTERP: NORMAL
VIRAL LOAD LOG: NORMAL LG COP/ML

## 2020-02-06 LAB
HBV DNA # SERPL NAA+PROBE: NOT DETECTED IU/ML
HCV RNA SERPL NAA DL=5-ACNC: NOT DETECTED IU/ML
HCV RNA SERPL NAA+PROBE-LOG IU: NOT DETECTED LOG10IU/ML
HEPB DNA PCR LOG: NOT DETECTED LOG10IU/ML

## 2020-02-25 ENCOUNTER — APPOINTMENT (OUTPATIENT)
Dept: NEPHROLOGY | Facility: CLINIC | Age: 75
End: 2020-02-25
Payer: MEDICARE

## 2020-02-25 VITALS
SYSTOLIC BLOOD PRESSURE: 148 MMHG | OXYGEN SATURATION: 95 % | HEIGHT: 71 IN | DIASTOLIC BLOOD PRESSURE: 69 MMHG | RESPIRATION RATE: 14 BRPM | WEIGHT: 257 LBS | HEART RATE: 94 BPM | TEMPERATURE: 98.4 F | BODY MASS INDEX: 35.98 KG/M2

## 2020-02-25 DIAGNOSIS — R91.1 SOLITARY PULMONARY NODULE: ICD-10-CM

## 2020-02-25 LAB
25(OH)D3 SERPL-MCNC: 22.1 NG/ML
ALBUMIN SERPL ELPH-MCNC: 3.8 G/DL
ALP BLD-CCNC: 109 U/L
ALT SERPL-CCNC: 16 U/L
ANION GAP SERPL CALC-SCNC: 15 MMOL/L
APPEARANCE: CLEAR
AST SERPL-CCNC: 16 U/L
BACTERIA: NEGATIVE
BASOPHILS # BLD AUTO: 0.04 K/UL
BASOPHILS NFR BLD AUTO: 0.5 %
BILIRUB SERPL-MCNC: 0.2 MG/DL
BILIRUBIN URINE: NEGATIVE
BLOOD URINE: NEGATIVE
BUN SERPL-MCNC: 27 MG/DL
CALCIUM SERPL-MCNC: 8.5 MG/DL
CALCIUM SERPL-MCNC: 8.5 MG/DL
CHLORIDE SERPL-SCNC: 104 MMOL/L
CHOLEST SERPL-MCNC: 174 MG/DL
CHOLEST/HDLC SERPL: 3.9 RATIO
CO2 SERPL-SCNC: 28 MMOL/L
COLOR: NORMAL
CREAT SERPL-MCNC: 1.4 MG/DL
CREAT SPEC-SCNC: 60 MG/DL
CREAT/PROT UR: 0.1 RATIO
EOSINOPHIL # BLD AUTO: 0.16 K/UL
EOSINOPHIL NFR BLD AUTO: 1.9 %
ESTIMATED AVERAGE GLUCOSE: 134 MG/DL
GLUCOSE QUALITATIVE U: NEGATIVE
GLUCOSE SERPL-MCNC: 52 MG/DL
HBA1C MFR BLD HPLC: 6.3 %
HCT VFR BLD CALC: 41.2 %
HDLC SERPL-MCNC: 44 MG/DL
HGB BLD-MCNC: 12.1 G/DL
HYALINE CASTS: 0 /LPF
IMM GRANULOCYTES NFR BLD AUTO: 1.1 %
KETONES URINE: NEGATIVE
LDH SERPL-CCNC: 268 U/L
LDLC SERPL CALC-MCNC: 93 MG/DL
LEUKOCYTE ESTERASE URINE: NEGATIVE
LYMPHOCYTES # BLD AUTO: 1.37 K/UL
LYMPHOCYTES NFR BLD AUTO: 16.4 %
MAGNESIUM SERPL-MCNC: 1.8 MG/DL
MAN DIFF?: NORMAL
MCHC RBC-ENTMCNC: 29.4 GM/DL
MCHC RBC-ENTMCNC: 30.3 PG
MCV RBC AUTO: 103 FL
MICROSCOPIC-UA: NORMAL
MONOCYTES # BLD AUTO: 0.46 K/UL
MONOCYTES NFR BLD AUTO: 5.5 %
NEUTROPHILS # BLD AUTO: 6.24 K/UL
NEUTROPHILS NFR BLD AUTO: 74.6 %
NITRITE URINE: NEGATIVE
PARATHYROID HORMONE INTACT: 183 PG/ML
PH URINE: 6
PHOSPHATE SERPL-MCNC: 1.6 MG/DL
PLATELET # BLD AUTO: 197 K/UL
POTASSIUM SERPL-SCNC: 4 MMOL/L
PROT SERPL-MCNC: 6.4 G/DL
PROT UR-MCNC: 5 MG/DL
PROTEIN URINE: NEGATIVE
RBC # BLD: 4 M/UL
RBC # FLD: 13.1 %
RED BLOOD CELLS URINE: 1 /HPF
SODIUM SERPL-SCNC: 147 MMOL/L
SPECIFIC GRAVITY URINE: 1.01
SQUAMOUS EPITHELIAL CELLS: 0 /HPF
TACROLIMUS SERPL-MCNC: 12 NG/ML
TRIGL SERPL-MCNC: 184 MG/DL
URATE SERPL-MCNC: 8.3 MG/DL
UROBILINOGEN URINE: NORMAL
WBC # FLD AUTO: 8.36 K/UL
WHITE BLOOD CELLS URINE: 1 /HPF

## 2020-02-25 PROCEDURE — 99214 OFFICE O/P EST MOD 30 MIN: CPT

## 2020-02-25 NOTE — REASON FOR VISIT
[Follow-Up] : a follow-up visit [FreeTextEntry1] : Post transplant follow up, now being evaluated for left lung nodule at [facility]

## 2020-02-25 NOTE — HISTORY OF PRESENT ILLNESS
[FreeTextEntry1] : Has been under evaluation for lung lesion, after pet scan was told to be benign but planned for biopsy on 3/2/20\par No new acute symptoms\par Has lipomas in the past right orbit and left side of neck and repost he has one on right side of chest cavity.\par Now being evaluated for left lung lesion, possibly being as per patient and PEt scan reportedly not suggestive of malignancy.

## 2020-02-25 NOTE — PHYSICAL EXAM
[General Appearance - Alert] : alert [General Appearance - In No Acute Distress] : in no acute distress [PERRL With Normal Accommodation] : pupils were equal in size, round, and reactive to light [Sclera] : the sclera and conjunctiva were normal [Extraocular Movements] : extraocular movements were intact [Outer Ear] : the ears and nose were normal in appearance [Oropharynx] : the oropharynx was normal [Neck Appearance] : the appearance of the neck was normal [Neck Cervical Mass (___cm)] : no neck mass was observed [Jugular Venous Distention Increased] : there was no jugular-venous distention [Thyroid Diffuse Enlargement] : the thyroid was not enlarged [Thyroid Nodule] : there were no palpable thyroid nodules [Auscultation Breath Sounds / Voice Sounds] : lungs were clear to auscultation bilaterally [Heart Sounds] : normal S1 and S2 [Heart Rate And Rhythm] : heart rate was normal and rhythm regular [Murmurs] : no murmurs [Heart Sounds Gallop] : no gallops [Edema] : there was no peripheral edema [Heart Sounds Pericardial Friction Rub] : no pericardial rub [Bowel Sounds] : normal bowel sounds [Abdomen Soft] : soft [Abdomen Tenderness] : non-tender [Cervical Lymph Nodes Enlarged Posterior Bilaterally] : posterior cervical [Cervical Lymph Nodes Enlarged Anterior Bilaterally] : anterior cervical [Supraclavicular Lymph Nodes Enlarged Bilaterally] : supraclavicular [Axillary Lymph Nodes Enlarged Bilaterally] : axillary [Inguinal Lymph Nodes Enlarged Bilaterally] : inguinal [Involuntary Movements] : no involuntary movements were seen [FreeTextEntry1] : PD catheter has been removed [] : no rash [No Focal Deficits] : no focal deficits [Oriented To Time, Place, And Person] : oriented to person, place, and time [Impaired Insight] : insight and judgment were intact [Affect] : the affect was normal

## 2020-02-25 NOTE — ASSESSMENT
[FreeTextEntry1] : Renal Transplant recipient: Recent treatment for presumed rejection which improved\par Immunosuppression: reviewed; , on tac/MMF/prednisone, last Tac level noted; . He took meds prior to labs today by mistake; 720 mg/dose MMF and prednisone \par Fall, left pectoral pain: Will f/u with primary MD\par Labs in 2 weeks. f/u in 1 month.\par DM: On Insulin.  He will f/u with endocrinologist. Gualberto Salazar\par Will continue to monitor and adjust treatment\par Hyperlipidemia: he was previously on Welchol had elevated LFT and joint/muscle pain with Lipitor. Will follow lipids\par Cardiovascular risk reduction discussed.  \par Peptic ulcer: On protonix\par Lung lesion: Being scheduled for evaluation at [facility]\par Left knee arthritis: On f/u with orthopedics surgeon, Dr. Mcclain at [place].\par Infection prophylaxis: On Bactrim, Valcyte as well as GI prophylaxis.\par Discussed again Renal Preservation strategies including achieving optimal weight through calory restriction and regular exercise, daily exercise, sleep hygiene, optimized control of glucose, blood pressure, lipids, avoidance of NSAIDs, Low Na and Low animal protein diet and medication adherence.\par Follow up in 6 weeks\par \par

## 2020-02-27 LAB
BKV DNA SPEC QL NAA+PROBE: NOT DETECTED COPIES/ML
CMV DNA SPEC QL NAA+PROBE: NOT DETECTED
CMVPCR LOG: NOT DETECTED LOG10IU/ML

## 2020-03-02 ENCOUNTER — RESULT REVIEW (OUTPATIENT)
Age: 75
End: 2020-03-02

## 2020-03-18 RX ORDER — MYCOPHENILIC ACID 360 MG/1
360 TABLET, DELAYED RELEASE ORAL
Qty: 120 | Refills: 11 | Status: DISCONTINUED | COMMUNITY
Start: 2019-04-16 | End: 2020-03-18

## 2020-04-06 ENCOUNTER — APPOINTMENT (OUTPATIENT)
Dept: TRANSPLANT | Facility: CLINIC | Age: 75
End: 2020-04-06

## 2020-04-07 ENCOUNTER — APPOINTMENT (OUTPATIENT)
Dept: NEPHROLOGY | Facility: CLINIC | Age: 75
End: 2020-04-07
Payer: MEDICARE

## 2020-04-07 LAB
ALBUMIN SERPL ELPH-MCNC: 3.8 G/DL
ALP BLD-CCNC: 109 U/L
ALT SERPL-CCNC: 15 U/L
ANION GAP SERPL CALC-SCNC: 13 MMOL/L
APPEARANCE: CLEAR
AST SERPL-CCNC: 27 U/L
BACTERIA: NEGATIVE
BASOPHILS # BLD AUTO: 0.05 K/UL
BASOPHILS NFR BLD AUTO: 0.6 %
BILIRUB SERPL-MCNC: 0.3 MG/DL
BILIRUBIN URINE: NEGATIVE
BLOOD URINE: NEGATIVE
BUN SERPL-MCNC: 34 MG/DL
CALCIUM SERPL-MCNC: 8.3 MG/DL
CHLORIDE SERPL-SCNC: 106 MMOL/L
CMV DNA SPEC QL NAA+PROBE: NOT DETECTED
CMVPCR LOG: NOT DETECTED LOGIU/ML
CO2 SERPL-SCNC: 29 MMOL/L
COLOR: NORMAL
CREAT SERPL-MCNC: 2.05 MG/DL
CREAT SPEC-SCNC: 43 MG/DL
CREAT/PROT UR: NORMAL RATIO
EOSINOPHIL # BLD AUTO: 0.08 K/UL
EOSINOPHIL NFR BLD AUTO: 1 %
GLUCOSE QUALITATIVE U: NEGATIVE
GLUCOSE SERPL-MCNC: 121 MG/DL
HCT VFR BLD CALC: 38.1 %
HGB BLD-MCNC: 11.1 G/DL
HYALINE CASTS: 0 /LPF
IMM GRANULOCYTES NFR BLD AUTO: 0.5 %
KETONES URINE: NEGATIVE
LDH SERPL-CCNC: 244 U/L
LEUKOCYTE ESTERASE URINE: NEGATIVE
LYMPHOCYTES # BLD AUTO: 0.86 K/UL
LYMPHOCYTES NFR BLD AUTO: 10.4 %
MAGNESIUM SERPL-MCNC: 1.9 MG/DL
MAN DIFF?: NORMAL
MCHC RBC-ENTMCNC: 29.1 GM/DL
MCHC RBC-ENTMCNC: 29.6 PG
MCV RBC AUTO: 101.6 FL
MICROSCOPIC-UA: NORMAL
MONOCYTES # BLD AUTO: 0.54 K/UL
MONOCYTES NFR BLD AUTO: 6.6 %
NEUTROPHILS # BLD AUTO: 6.66 K/UL
NEUTROPHILS NFR BLD AUTO: 80.9 %
NITRITE URINE: NEGATIVE
PH URINE: 6
PHOSPHATE SERPL-MCNC: 3.9 MG/DL
PLATELET # BLD AUTO: 225 K/UL
POTASSIUM SERPL-SCNC: 5.4 MMOL/L
PROT SERPL-MCNC: 6.6 G/DL
PROT UR-MCNC: <4 MG/DL
PROTEIN URINE: NEGATIVE
RBC # BLD: 3.75 M/UL
RBC # FLD: 13 %
RED BLOOD CELLS URINE: 1 /HPF
SODIUM SERPL-SCNC: 148 MMOL/L
SPECIFIC GRAVITY URINE: 1.01
SQUAMOUS EPITHELIAL CELLS: 0 /HPF
TACROLIMUS SERPL-MCNC: 4.2 NG/ML
UROBILINOGEN URINE: NORMAL
WBC # FLD AUTO: 8.23 K/UL
WHITE BLOOD CELLS URINE: 0 /HPF

## 2020-04-07 PROCEDURE — 99214 OFFICE O/P EST MOD 30 MIN: CPT | Mod: 95

## 2020-04-07 NOTE — HISTORY OF PRESENT ILLNESS
[Home] : at home, [unfilled] , at the time of the visit. [Medical Office: ([facility])___] : at ~his/her~ medical office located in V [Patient] : the patient [Self] : self [FreeTextEntry1] : This visit is provided by telehealth using real time 2 way communication technology. This patient is located at home and the provider is located at the [facility] medical office at [address] [place] [address]. Patient and family member participated in this visit.\par Verbal consent for this visit was given by patient/accompanying family member\par Total duration of this visit which included conversation, lab review, medication review and clinical assessment and advice lasted approximately 25-30 minutes.\par \par Patient reports he is scheduled for stress test/cardiac risk evaluation for lung cancer resection, likely at [place].\par Home vital signs: Weight 260 Lbs, blood pressure: 140/66 Temp 97 deg F \par Currently:\par \par Patient feels well\par Reviewed for acute symptoms-currently has none.\par Taking precautions to prevent COVID exposure\par I reviewed current home  blood pressure and home glucose control and medications.\par On Telehealth visit:\par Patient appears comfortable\par No distress, not dyspneic\par Conscious, alert, oriented X 3\par Speech: Normal\par Other observations as noted\par Reviewed last lab data \par

## 2020-04-07 NOTE — ASSESSMENT
[FreeTextEntry1] : \par Clinical Impression:\par Kidney Transplant recipient, functioning allograft, elevated creatinine\par Lung Cancer, awaiting surgery\par Immunosuppression- Reviewed and adjusted. On modified immunosuppression in view of recent diagnosis of squamous cell lung cancer on biopsy\par Will switch to MTOR i based therapy once surgery is done and incision heals\par DM Controlled.\par \par Plan:\par Immunosuppression adjusted, noted therapeutic Tac level, elevated creatinine\par Will repeat labs in 2 weeks\par Continue current medications\par additional changes as noted\par Labs and follow up visit to be scheduled as discussed.\par Discussed COVID infection prevention strategies including handwashing, social distancing and monitoring for any signs or symptoms.\par \par

## 2020-05-14 ENCOUNTER — APPOINTMENT (OUTPATIENT)
Dept: TRANSPLANT | Facility: CLINIC | Age: 75
End: 2020-05-14

## 2020-05-18 ENCOUNTER — APPOINTMENT (OUTPATIENT)
Dept: NEPHROLOGY | Facility: CLINIC | Age: 75
End: 2020-05-18

## 2020-05-22 ENCOUNTER — RX RENEWAL (OUTPATIENT)
Age: 75
End: 2020-05-22

## 2020-05-28 ENCOUNTER — APPOINTMENT (OUTPATIENT)
Dept: NEPHROLOGY | Facility: CLINIC | Age: 75
End: 2020-05-28
Payer: MEDICARE

## 2020-05-28 DIAGNOSIS — I48.91 UNSPECIFIED ATRIAL FIBRILLATION: ICD-10-CM

## 2020-05-28 PROCEDURE — 99214 OFFICE O/P EST MOD 30 MIN: CPT | Mod: 95

## 2020-05-28 RX ORDER — METOPROLOL TARTRATE 50 MG/1
50 TABLET, FILM COATED ORAL TWICE DAILY
Qty: 180 | Refills: 3 | Status: ACTIVE | COMMUNITY
Start: 2020-05-28

## 2020-05-28 RX ORDER — APIXABAN 5 MG/1
5 TABLET, FILM COATED ORAL
Qty: 60 | Refills: 3 | Status: ACTIVE | COMMUNITY
Start: 2020-05-28

## 2020-05-28 NOTE — HISTORY OF PRESENT ILLNESS
[Home] : at home, [unfilled] , at the time of the visit. [FreeTextEntry1] : This visit is provided by telehealth using real time 2 way communication technology. This patient is located at home and the provider is located at the [facility] medical office at [address] [place] [address]. Patient  participated in this visit.\par Verbal consent for this visit was given by patient.\par Total duration of this visit which included conversation, lab review, medication review and clinical assessment and advice lasted approximately 25-30 minutes.\par \par Patient reports he is now cancer free after lung cancer resection,   at [place]. Was readmitted for post op pneumonia, reported COVID multiple times negative and had A fib, on eliquis now, came home 5 days prior to this visit after the readmission.\par Home vital signs: Weight 267 Lbs, blood pressure: 119/68  Glucose 176 mg/dl\par Has home visits by nurses. Has had incisional sutures/staples removed already, reports last creatinine to be 1.4 mg/dl.\par \par Currently:\par \par Patient feels well overall. \par Reviewed for acute symptoms-currently has none.\par Pain controlled with opioids.\par Taking precautions to prevent COVID exposure\par I reviewed current home  blood pressure and home glucose control and medications.\par \par On Telehealth visit:\par Patient appears comfortable\par No distress, not dyspneic\par Conscious, alert, oriented X 3\par Speech: Normal\par Other observations- He is ambulatory\par Reviewed last lab data \par reports his creatinine to be 1.4 prior to discharge 5 days ago. He is on Envarsus 5 mg/day previously 6 as per nephrologist at [place]. He is on 10 mg prednisone.\par  [Medical Office: ([facility])___] : at ~his/her~ medical office located in [place] [Patient] : the patient [Self] : self

## 2020-05-28 NOTE — ASSESSMENT
[FreeTextEntry1] : Clinical Impression:\par Kidney Transplant recipient, functioning allograft, stable creatinine\par Lung Cancer, s/p surgery- reported cancer free not scheduled for chemo or radiation. now recovering from post op pneumonia\par Immunosuppression- Reviewed . On modified immunosuppression in view of recent diagnosis of squamous cell lung cancer.\par Will switch to MTOR i based therapy once he has fully healed.\par DM Controlled.\par \par Plan:\par Immunosuppression adjusted, noted stable reported creatinine\par Will repeat labs in 4 weeks\par Continue current medications\par additional changes after next visit\par Labs and follow up visit to be scheduled.\par Discussed COVID infection prevention strategies including handwashing, social distancing and monitoring for any signs or symptoms.\par \par Follow up: Labs and follow up in 4 weeks.

## 2020-05-28 NOTE — REASON FOR VISIT
[Follow-Up] : a follow-up visit [FreeTextEntry1] : post transplant follow up- had surgery for lung cancer at [place]

## 2020-07-02 ENCOUNTER — APPOINTMENT (OUTPATIENT)
Dept: TRANSPLANT | Facility: CLINIC | Age: 75
End: 2020-07-02

## 2020-07-06 ENCOUNTER — APPOINTMENT (OUTPATIENT)
Dept: NEPHROLOGY | Facility: CLINIC | Age: 75
End: 2020-07-06
Payer: MEDICARE

## 2020-07-06 PROCEDURE — 99214 OFFICE O/P EST MOD 30 MIN: CPT | Mod: 95

## 2020-07-06 NOTE — REASON FOR VISIT
[Follow-Up] : a follow-up visit [FreeTextEntry1] : post transplant follow up- had surgery for lung cancer at [place]

## 2020-07-06 NOTE — ASSESSMENT
[FreeTextEntry1] : Clinical Impression:\par Kidney Transplant recipient, functioning allograft, elevated creatinine. Noted as per his reported creatinine it is now 2. On lasix, no edema, not on metoprolol blood pressure borderline elevated. Has local follow up since he is unable to visit us. He is advised to consult his local physician Dr. Wheatley whom he is seeing frequently regarding lasix/ antihypertensive adjustments. Gave mu contact information including cell no. Also need to get transplant lab work done locally. Will arrange.\par Lung Cancer, s/p surgery- reported cancer free not scheduled for chemo or radiation. now being treated for anemia by hematology/oncology team.\par Immunosuppression- Reviewed . On modified immunosuppression in view of recent diagnosis of squamous cell lung cancer. Envarsus 5 and prednisone 10. Not on MMF. Will switch to MTOR i based therapy once established regular follow up and lab draw if not proteinuric.\par DM Controlled.\par \par Plan:\par Immunosuppression reviewed, noted reported creatinine\par Will repeat labs locally.\par Continue current medications\par additional changes after next visit\par Labs and follow up visit to be scheduled.\par Discussed COVID infection prevention strategies including handwashing, social distancing and monitoring for any signs or symptoms.\par \par Follow up: Labs and follow up every 4 weeks.

## 2020-07-06 NOTE — HISTORY OF PRESENT ILLNESS
[Home] : at home, [unfilled] , at the time of the visit. [Medical Office: ([facility])___] : at the medical office located in  [Verbal consent obtained from patient] : the patient, [unfilled] [FreeTextEntry1] : This visit is provided by telehealth using real time 2 way communication technology. This patient is located at home and the provider is located at the [facility] medical office at [address] [place] [address]. Patient  participated in this visit.\par Verbal consent for this visit was given by patient.\par Total duration of this visit which included conversation, lab review, medication review and clinical assessment and advice lasted approximately 25-30 minutes.\par \par Patient reports he is now cancer free after lung cancer resection (20),   at [place]. \par Notably he was readmitted for post op pneumonia, reported COVID multiple times negative and had A fib, on eliquis now, has had home PT now able to walk and water his plants and walks upto the club house..\par Home vital signs: Weight 243 Lbs, blood pressure: 150/58  Glucose 90 mg/dl\par Has home visits by nurses. Has had incisional sutures/staples removed already, reports last creatinine was 2.0. Currently taking Envarsus 5 mg daily and prednisone 10 mg daily for immunosuppression. His niece, Adrian Coleman, (daughter of his brother who  of lung Ca 10 years ago) who is a  in [employer] [facility] is helping with food shopping etc.\par \par Currently:\par \par Patient feels tired overall. Fels his symptoms are from anemia and the lung resection surgery. He has no ankle swelling but is taking lasix He is not taking metoprolol. He is having iron infusion this week at [facility] [place] Dr. Wheatley is treating him ()\par Reviewed for acute symptoms-currently has tiredness.\par Reports no acute pain.\par Taking precautions to prevent COVID exposure\par I reviewed current home  blood pressure and home glucose control and medications.\par \par On Telehealth visit:\par Patient appears comfortable\par No acute distress, not dyspneic\par Conscious, alert, oriented X 3\par Speech: Normal\par Other observations- He is ambulatory\par Reviewed last lab data \par reports his creatinine to be 2 prior lab work done 2-3 weeks ago. He is on Envarsus 5 mg/day previously 6 as per nephrologist at [place]. He is on 10 mg prednisone. He reports not taking metoprolol or bactrim as per doctors advice.\par

## 2020-09-04 ENCOUNTER — APPOINTMENT (OUTPATIENT)
Dept: TRANSPLANT | Facility: CLINIC | Age: 75
End: 2020-09-04

## 2020-09-06 LAB
25(OH)D3 SERPL-MCNC: 24.7 NG/ML
ALBUMIN SERPL ELPH-MCNC: 3.7 G/DL
ALP BLD-CCNC: 95 U/L
ALT SERPL-CCNC: 13 U/L
ANION GAP SERPL CALC-SCNC: 10 MMOL/L
APPEARANCE: CLEAR
AST SERPL-CCNC: 14 U/L
BACTERIA: NEGATIVE
BASOPHILS # BLD AUTO: 0.04 K/UL
BASOPHILS NFR BLD AUTO: 0.6 %
BILIRUB SERPL-MCNC: 0.2 MG/DL
BILIRUBIN URINE: NEGATIVE
BLOOD URINE: ABNORMAL
BUN SERPL-MCNC: 24 MG/DL
CALCIUM SERPL-MCNC: 8 MG/DL
CALCIUM SERPL-MCNC: 8 MG/DL
CHLORIDE SERPL-SCNC: 110 MMOL/L
CHOLEST SERPL-MCNC: 142 MG/DL
CHOLEST/HDLC SERPL: 4.9 RATIO
CO2 SERPL-SCNC: 22 MMOL/L
COLOR: NORMAL
CREAT SERPL-MCNC: 1.63 MG/DL
CREAT SPEC-SCNC: 144 MG/DL
CREAT/PROT UR: 0.2 RATIO
EOSINOPHIL # BLD AUTO: 0.13 K/UL
EOSINOPHIL NFR BLD AUTO: 2.1 %
ESTIMATED AVERAGE GLUCOSE: 154 MG/DL
GLUCOSE QUALITATIVE U: NEGATIVE
GLUCOSE SERPL-MCNC: 165 MG/DL
HBA1C MFR BLD HPLC: 7 %
HCT VFR BLD CALC: 34.1 %
HDLC SERPL-MCNC: 29 MG/DL
HGB BLD-MCNC: 9.8 G/DL
HYALINE CASTS: 0 /LPF
IMM GRANULOCYTES NFR BLD AUTO: 0.6 %
KETONES URINE: NEGATIVE
LDH SERPL-CCNC: 142 U/L
LDLC SERPL CALC-MCNC: 64 MG/DL
LEUKOCYTE ESTERASE URINE: NEGATIVE
LYMPHOCYTES # BLD AUTO: 1.02 K/UL
LYMPHOCYTES NFR BLD AUTO: 16.4 %
MAGNESIUM SERPL-MCNC: 1.7 MG/DL
MAN DIFF?: NORMAL
MCHC RBC-ENTMCNC: 28.7 GM/DL
MCHC RBC-ENTMCNC: 29.9 PG
MCV RBC AUTO: 104 FL
MICROSCOPIC-UA: NORMAL
MONOCYTES # BLD AUTO: 0.57 K/UL
MONOCYTES NFR BLD AUTO: 9.2 %
NEUTROPHILS # BLD AUTO: 4.41 K/UL
NEUTROPHILS NFR BLD AUTO: 71.1 %
NITRITE URINE: NEGATIVE
PARATHYROID HORMONE INTACT: 84 PG/ML
PH URINE: 6
PHOSPHATE SERPL-MCNC: 2.4 MG/DL
PLATELET # BLD AUTO: 166 K/UL
POTASSIUM SERPL-SCNC: 4.8 MMOL/L
PROT SERPL-MCNC: 5.9 G/DL
PROT UR-MCNC: 23 MG/DL
PROTEIN URINE: ABNORMAL
RBC # BLD: 3.28 M/UL
RBC # FLD: 15.4 %
RED BLOOD CELLS URINE: 44 /HPF
SODIUM SERPL-SCNC: 142 MMOL/L
SPECIFIC GRAVITY URINE: 1.02
SQUAMOUS EPITHELIAL CELLS: 0 /HPF
TACROLIMUS SERPL-MCNC: 3.3 NG/ML
TRIGL SERPL-MCNC: 244 MG/DL
URATE SERPL-MCNC: 9.4 MG/DL
UROBILINOGEN URINE: NORMAL
WBC # FLD AUTO: 6.21 K/UL
WHITE BLOOD CELLS URINE: 4 /HPF

## 2020-09-06 RX ORDER — TACROLIMUS 1 MG/1
1 TABLET, EXTENDED RELEASE ORAL
Qty: 90 | Refills: 3 | Status: DISCONTINUED | COMMUNITY
Start: 2019-10-30 | End: 2020-09-06

## 2020-09-06 RX ORDER — TACROLIMUS 4 MG/1
4 TABLET, EXTENDED RELEASE ORAL
Qty: 90 | Refills: 3 | Status: DISCONTINUED | COMMUNITY
Start: 2019-09-16 | End: 2020-09-06

## 2020-09-14 LAB
BKV DNA SPEC QL NAA+PROBE: NOT DETECTED COPIES/ML
CMV DNA SPEC QL NAA+PROBE: NOT DETECTED IU/ML

## 2020-09-22 ENCOUNTER — APPOINTMENT (OUTPATIENT)
Dept: NEPHROLOGY | Facility: CLINIC | Age: 75
End: 2020-09-22
Payer: MEDICARE

## 2020-09-22 PROCEDURE — 99214 OFFICE O/P EST MOD 30 MIN: CPT | Mod: 95

## 2020-09-22 NOTE — REASON FOR VISIT
[Follow-Up] : a follow-up visit [FreeTextEntry1] : post transplant follow up- had surgery for lung cancer at [place], had C diff and recovered 3 weeks ago

## 2020-09-22 NOTE — HISTORY OF PRESENT ILLNESS
[FreeTextEntry1] : This visit is provided by telehealth using real time 2 way communication technology. This patient is located at home and the provider is located at the [facility] medical office at [address] [place] [address]. Patient  participated in this visit.\par Verbal consent for this visit was given by patient.\par Total duration of this visit which included conversation, lab review, medication review and clinical assessment and advice lasted approximately 25-30 minutes.\par \par Patient reports he is now cancer free after lung cancer resection (20),   at [place]. \par Notably he was readmitted for post op pneumonia, reported COVID multiple times negative and had A fib, on eliquis now, has had home PT now able to walk and water his plants and walks upto the club house..\par Home vital signs: Weight 220 Lbs, blood pressure: 119/56  Glucose controlled.\par Has been driving, independent for ADL.\par \par Currently taking Tacrolimus 3 mg twice daily and prednisone 5 mg daily for immunosuppression. His niece, Adrian Coleman, (daughter of his brother who  of lung Ca 10 years ago) who is a  in [facility] is helping with food shopping etc.\par \par Currently:\par \par Patient feels tired overall. Fels his symptoms are from anemia and the lung resection surgery. He has no ankle swelling but is taking lasix 40 mg daily. Oncologist Dr. Wheatley is treating him ()\par Last labs reviewed.\par Reviewed for acute symptoms-currently has knee pains\par Reports no acute pain.\par Taking precautions to prevent COVID exposure\par I reviewed current home  blood pressure and home glucose control and medications.\par \par \par On Telehealth visit:\par Patient appears comfortable\par No acute distress, not dyspneic\par Conscious, alert, oriented X 3\par Speech: Normal\par Other observations- He is ambulatory\par Reviewed last lab data \par \par He plans to see Nephrologist in Dr Jones's old office- Dr. Núñez. [Home] : at home, [unfilled] , at the time of the visit. [Medical Office: ([facility])___] : at the medical office located in  [Verbal consent obtained from patient] : the patient, [unfilled]

## 2020-09-22 NOTE — ASSESSMENT
[FreeTextEntry1] : Clinical Impression:\par Kidney Transplant recipient, functioning allograft, elevated creatinine. On lasix, no edema, not on metoprolol blood pressure borderline elevated. Has local follow up since he is unable to visit us. He is advised to consult his local physician Dr. Wheatley whom he is seeing frequently regarding lasix/ antihypertensive adjustments. Gave mu contact information including cell no. Also need to get transplant lab work done locally. Will arrange.\par Lung Cancer, s/p surgery- reported cancer free not scheduled for chemo or radiation. now being treated for anemia by hematology/oncology team.\par \par Immunosuppression- Reviewed . On modified immunosuppression in view of recent diagnosis of squamous cell lung cancer.  Will switch to MTOR i based therapy once established regular follow up and lab draw if remains not proteinuric.\par He has follow up PET scan in October and then will decide regarding witching to MTOR.\par Follpow up in November.\par DM Controlled.\par \par Plan:\par Immunosuppression reviewed, noted reported creatinine\par Will repeat labs locally.\par Continue current medications\par additional changes after next visit\par Labs and follow up visit to be scheduled.\par Discussed COVID infection prevention strategies including handwashing, social distancing and monitoring for any signs or symptoms.\par \par Follow up: Labs and follow up every 4 weeks.

## 2021-01-01 ENCOUNTER — NON-APPOINTMENT (OUTPATIENT)
Age: 76
End: 2021-01-01

## 2021-01-01 ENCOUNTER — APPOINTMENT (OUTPATIENT)
Dept: TRANSPLANT | Facility: CLINIC | Age: 76
End: 2021-01-01

## 2021-01-01 ENCOUNTER — APPOINTMENT (OUTPATIENT)
Dept: NEPHROLOGY | Facility: CLINIC | Age: 76
End: 2021-01-01

## 2021-01-01 LAB
25(OH)D3 SERPL-MCNC: 32.3 NG/ML
ALBUMIN SERPL ELPH-MCNC: 3.6 G/DL
ALBUMIN SERPL ELPH-MCNC: 3.7 G/DL
ALBUMIN SERPL ELPH-MCNC: 3.8 G/DL
ALP BLD-CCNC: 101 U/L
ALP BLD-CCNC: 76 U/L
ALP BLD-CCNC: 76 U/L
ALT SERPL-CCNC: 14 U/L
ALT SERPL-CCNC: 16 U/L
ALT SERPL-CCNC: 22 U/L
ANION GAP SERPL CALC-SCNC: 10 MMOL/L
ANION GAP SERPL CALC-SCNC: 11 MMOL/L
ANION GAP SERPL CALC-SCNC: 13 MMOL/L
APPEARANCE: CLEAR
AST SERPL-CCNC: 19 U/L
AST SERPL-CCNC: 23 U/L
AST SERPL-CCNC: 25 U/L
BACTERIA: NEGATIVE
BASOPHILS # BLD AUTO: 0.05 K/UL
BASOPHILS # BLD AUTO: 0.05 K/UL
BASOPHILS # BLD AUTO: 0.07 K/UL
BASOPHILS NFR BLD AUTO: 0.7 %
BASOPHILS NFR BLD AUTO: 0.8 %
BASOPHILS NFR BLD AUTO: 0.9 %
BILIRUB SERPL-MCNC: 0.4 MG/DL
BILIRUB SERPL-MCNC: 0.5 MG/DL
BILIRUB SERPL-MCNC: 0.5 MG/DL
BILIRUBIN URINE: NEGATIVE
BKV DNA SPEC QL NAA+PROBE: NEGATIVE COPIES/ML
BKV DNA SPEC QL NAA+PROBE: NEGATIVE COPIES/ML
BKV DNA SPEC QL NAA+PROBE: NORMAL COPIES/ML
BLOOD URINE: NEGATIVE
BUN SERPL-MCNC: 23 MG/DL
BUN SERPL-MCNC: 25 MG/DL
BUN SERPL-MCNC: 26 MG/DL
CALCIUM SERPL-MCNC: 8.3 MG/DL
CALCIUM SERPL-MCNC: 8.3 MG/DL
CALCIUM SERPL-MCNC: 8.7 MG/DL
CHLORIDE SERPL-SCNC: 104 MMOL/L
CHLORIDE SERPL-SCNC: 105 MMOL/L
CHLORIDE SERPL-SCNC: 110 MMOL/L
CHOLEST SERPL-MCNC: 169 MG/DL
CMV DNA SPEC QL NAA+PROBE: NOT DETECTED IU/ML
CO2 SERPL-SCNC: 23 MMOL/L
CO2 SERPL-SCNC: 25 MMOL/L
CO2 SERPL-SCNC: 25 MMOL/L
COLOR: YELLOW
COVID-19 SPIKE DOMAIN ANTIBODY INTERPRETATION: POSITIVE
COVID-19 SPIKE DOMAIN ANTIBODY INTERPRETATION: POSITIVE
CREAT SERPL-MCNC: 1.53 MG/DL
CREAT SERPL-MCNC: 1.54 MG/DL
CREAT SERPL-MCNC: 1.83 MG/DL
CREAT SPEC-SCNC: 116 MG/DL
CREAT SPEC-SCNC: 186 MG/DL
CREAT SPEC-SCNC: 191 MG/DL
CREAT/PROT UR: 0.1 RATIO
EOSINOPHIL # BLD AUTO: 0.15 K/UL
EOSINOPHIL # BLD AUTO: 0.19 K/UL
EOSINOPHIL # BLD AUTO: 0.24 K/UL
EOSINOPHIL NFR BLD AUTO: 2.2 %
EOSINOPHIL NFR BLD AUTO: 2.4 %
EOSINOPHIL NFR BLD AUTO: 3.9 %
ESTIMATED AVERAGE GLUCOSE: 140 MG/DL
GLUCOSE QUALITATIVE U: NEGATIVE
GLUCOSE SERPL-MCNC: 135 MG/DL
GLUCOSE SERPL-MCNC: 143 MG/DL
GLUCOSE SERPL-MCNC: 38 MG/DL
HBA1C MFR BLD HPLC: 6.5 %
HCT VFR BLD CALC: 40.4 %
HCT VFR BLD CALC: 41.4 %
HCT VFR BLD CALC: 42.5 %
HDLC SERPL-MCNC: 46 MG/DL
HGB BLD-MCNC: 12 G/DL
HGB BLD-MCNC: 12.4 G/DL
HGB BLD-MCNC: 13 G/DL
HYALINE CASTS: 0 /LPF
HYALINE CASTS: 0 /LPF
HYALINE CASTS: 1 /LPF
IMM GRANULOCYTES NFR BLD AUTO: 0.1 %
IMM GRANULOCYTES NFR BLD AUTO: 0.3 %
IMM GRANULOCYTES NFR BLD AUTO: 0.3 %
KETONES URINE: NEGATIVE
LDH SERPL-CCNC: 168 U/L
LDH SERPL-CCNC: 190 U/L
LDH SERPL-CCNC: 217 U/L
LDLC SERPL CALC-MCNC: 101 MG/DL
LEUKOCYTE ESTERASE URINE: NEGATIVE
LOG 10 BK QUANTITATION PCR: 4.8
LOG 10 BK QUANTITATION PCR: NORMAL
LOG 10 BK QUANTITATION PCR: NORMAL
LYMPHOCYTES # BLD AUTO: 1.51 K/UL
LYMPHOCYTES # BLD AUTO: 1.57 K/UL
LYMPHOCYTES # BLD AUTO: 1.74 K/UL
LYMPHOCYTES NFR BLD AUTO: 20.2 %
LYMPHOCYTES NFR BLD AUTO: 21.7 %
LYMPHOCYTES NFR BLD AUTO: 28.6 %
MAGNESIUM SERPL-MCNC: 1.8 MG/DL
MAGNESIUM SERPL-MCNC: 2 MG/DL
MAGNESIUM SERPL-MCNC: 2.3 MG/DL
MAN DIFF?: NORMAL
MCHC RBC-ENTMCNC: 28.3 PG
MCHC RBC-ENTMCNC: 28.6 PG
MCHC RBC-ENTMCNC: 29 PG
MCHC RBC-ENTMCNC: 29.7 GM/DL
MCHC RBC-ENTMCNC: 30 GM/DL
MCHC RBC-ENTMCNC: 30.6 GM/DL
MCV RBC AUTO: 94.5 FL
MCV RBC AUTO: 94.9 FL
MCV RBC AUTO: 96.4 FL
MICROSCOPIC-UA: NORMAL
MONOCYTES # BLD AUTO: 0.64 K/UL
MONOCYTES # BLD AUTO: 0.67 K/UL
MONOCYTES # BLD AUTO: 0.77 K/UL
MONOCYTES NFR BLD AUTO: 12.6 %
MONOCYTES NFR BLD AUTO: 8.2 %
MONOCYTES NFR BLD AUTO: 9.6 %
NEUTROPHILS # BLD AUTO: 3.27 K/UL
NEUTROPHILS # BLD AUTO: 4.55 K/UL
NEUTROPHILS # BLD AUTO: 5.3 K/UL
NEUTROPHILS NFR BLD AUTO: 53.8 %
NEUTROPHILS NFR BLD AUTO: 65.5 %
NEUTROPHILS NFR BLD AUTO: 68.2 %
NITRITE URINE: NEGATIVE
NONHDLC SERPL-MCNC: 123 MG/DL
PARATHYROID HORMONE INTACT: 109 PG/ML
PARATHYROID HORMONE INTACT: 130 PG/ML
PARATHYROID HORMONE INTACT: 74 PG/ML
PH URINE: 6
PH URINE: 6
PH URINE: 7
PHOSPHATE SERPL-MCNC: 2.6 MG/DL
PHOSPHATE SERPL-MCNC: 2.7 MG/DL
PHOSPHATE SERPL-MCNC: 3 MG/DL
PLATELET # BLD AUTO: 203 K/UL
PLATELET # BLD AUTO: 210 K/UL
PLATELET # BLD AUTO: 226 K/UL
POTASSIUM SERPL-SCNC: 4.1 MMOL/L
POTASSIUM SERPL-SCNC: 4.5 MMOL/L
POTASSIUM SERPL-SCNC: 4.6 MMOL/L
PROT SERPL-MCNC: 6.4 G/DL
PROT SERPL-MCNC: 6.4 G/DL
PROT SERPL-MCNC: 6.6 G/DL
PROT UR-MCNC: 15 MG/DL
PROT UR-MCNC: 15 MG/DL
PROT UR-MCNC: 22 MG/DL
PROTEIN URINE: ABNORMAL
PROTEIN URINE: NORMAL
PROTEIN URINE: NORMAL
RBC # BLD: 4.19 M/UL
RBC # BLD: 4.38 M/UL
RBC # BLD: 4.48 M/UL
RBC # FLD: 14.2 %
RBC # FLD: 14.9 %
RBC # FLD: 15.7 %
RED BLOOD CELLS URINE: 1 /HPF
RED BLOOD CELLS URINE: 2 /HPF
RED BLOOD CELLS URINE: 4 /HPF
SARS-COV-2 AB SERPL IA-ACNC: 11.8 U/ML
SARS-COV-2 AB SERPL IA-ACNC: 18.1 U/ML
SIROLIMUS BLD-MCNC: 13.2 NG/ML
SIROLIMUS BLD-MCNC: 2 NG/ML
SIROLIMUS BLD-MCNC: <2 NG/ML
SODIUM SERPL-SCNC: 139 MMOL/L
SODIUM SERPL-SCNC: 140 MMOL/L
SODIUM SERPL-SCNC: 146 MMOL/L
SPECIFIC GRAVITY URINE: 1.02
SPECIFIC GRAVITY URINE: 1.02
SPECIFIC GRAVITY URINE: 1.03
SQUAMOUS EPITHELIAL CELLS: 1 /HPF
SQUAMOUS EPITHELIAL CELLS: 1 /HPF
SQUAMOUS EPITHELIAL CELLS: 2 /HPF
TACROLIMUS SERPL-MCNC: 13.9 NG/ML
TACROLIMUS SERPL-MCNC: 2.9 NG/ML
TACROLIMUS SERPL-MCNC: 6.2 NG/ML
TRIGL SERPL-MCNC: 108 MG/DL
URATE SERPL-MCNC: 7.2 MG/DL
URATE SERPL-MCNC: 8.3 MG/DL
URATE SERPL-MCNC: 9.9 MG/DL
UROBILINOGEN URINE: ABNORMAL
UROBILINOGEN URINE: NORMAL
UROBILINOGEN URINE: NORMAL
WBC # FLD AUTO: 6.09 K/UL
WBC # FLD AUTO: 6.95 K/UL
WBC # FLD AUTO: 7.78 K/UL
WHITE BLOOD CELLS URINE: 1 /HPF
WHITE BLOOD CELLS URINE: 1 /HPF
WHITE BLOOD CELLS URINE: 4 /HPF

## 2021-01-01 RX ORDER — SIROLIMUS 1 MG/1
1 TABLET, FILM COATED ORAL
Qty: 90 | Refills: 2 | Status: DISCONTINUED | COMMUNITY
Start: 2021-05-27 | End: 2021-01-01

## 2021-01-04 ENCOUNTER — APPOINTMENT (OUTPATIENT)
Dept: TRANSPLANT | Facility: CLINIC | Age: 76
End: 2021-01-04

## 2021-01-05 LAB
25(OH)D3 SERPL-MCNC: 20.3 NG/ML
ALBUMIN SERPL ELPH-MCNC: 3.7 G/DL
ALP BLD-CCNC: 86 U/L
ALT SERPL-CCNC: 31 U/L
ANION GAP SERPL CALC-SCNC: 15 MMOL/L
APPEARANCE: CLEAR
AST SERPL-CCNC: 61 U/L
BACTERIA: NEGATIVE
BASOPHILS # BLD AUTO: 0.04 K/UL
BASOPHILS NFR BLD AUTO: 0.6 %
BILIRUB SERPL-MCNC: 0.3 MG/DL
BILIRUBIN URINE: NEGATIVE
BLOOD URINE: ABNORMAL
BUN SERPL-MCNC: 50 MG/DL
CALCIUM SERPL-MCNC: 8 MG/DL
CALCIUM SERPL-MCNC: 8 MG/DL
CHLORIDE SERPL-SCNC: 106 MMOL/L
CHOLEST SERPL-MCNC: 159 MG/DL
CO2 SERPL-SCNC: 24 MMOL/L
COLOR: YELLOW
CREAT SERPL-MCNC: 3.71 MG/DL
CREAT SPEC-SCNC: 105 MG/DL
CREAT/PROT UR: 0.1 RATIO
EOSINOPHIL # BLD AUTO: 0.13 K/UL
EOSINOPHIL NFR BLD AUTO: 1.9 %
ESTIMATED AVERAGE GLUCOSE: 146 MG/DL
GLUCOSE QUALITATIVE U: NEGATIVE
GLUCOSE SERPL-MCNC: 144 MG/DL
HBA1C MFR BLD HPLC: 6.7 %
HCT VFR BLD CALC: 36.1 %
HDLC SERPL-MCNC: 36 MG/DL
HGB BLD-MCNC: 10.4 G/DL
HYALINE CASTS: 1 /LPF
IMM GRANULOCYTES NFR BLD AUTO: 0.3 %
KETONES URINE: NEGATIVE
LDH SERPL-CCNC: 298 U/L
LDLC SERPL CALC-MCNC: 96 MG/DL
LEUKOCYTE ESTERASE URINE: NEGATIVE
LYMPHOCYTES # BLD AUTO: 1.44 K/UL
LYMPHOCYTES NFR BLD AUTO: 20.8 %
MAGNESIUM SERPL-MCNC: 2.3 MG/DL
MAN DIFF?: NORMAL
MCHC RBC-ENTMCNC: 28.8 GM/DL
MCHC RBC-ENTMCNC: 29 PG
MCV RBC AUTO: 100.6 FL
MICROSCOPIC-UA: NORMAL
MONOCYTES # BLD AUTO: 0.72 K/UL
MONOCYTES NFR BLD AUTO: 10.4 %
NEUTROPHILS # BLD AUTO: 4.56 K/UL
NEUTROPHILS NFR BLD AUTO: 66 %
NITRITE URINE: NEGATIVE
NONHDLC SERPL-MCNC: 123 MG/DL
PARATHYROID HORMONE INTACT: 141 PG/ML
PH URINE: 6
PHOSPHATE SERPL-MCNC: 4.5 MG/DL
PLATELET # BLD AUTO: 201 K/UL
POTASSIUM SERPL-SCNC: 4.7 MMOL/L
PROT SERPL-MCNC: 6.1 G/DL
PROT UR-MCNC: 10 MG/DL
PROTEIN URINE: NORMAL
RBC # BLD: 3.59 M/UL
RBC # FLD: 13.3 %
RED BLOOD CELLS URINE: 57 /HPF
SODIUM SERPL-SCNC: 145 MMOL/L
SPECIFIC GRAVITY URINE: 1.01
SQUAMOUS EPITHELIAL CELLS: 1 /HPF
TACROLIMUS SERPL-MCNC: 23.9 NG/ML
TRIGL SERPL-MCNC: 135 MG/DL
URATE SERPL-MCNC: 12.2 MG/DL
UROBILINOGEN URINE: NORMAL
WBC # FLD AUTO: 6.91 K/UL
WHITE BLOOD CELLS URINE: 4 /HPF

## 2021-01-07 ENCOUNTER — APPOINTMENT (OUTPATIENT)
Dept: TRANSPLANT | Facility: CLINIC | Age: 76
End: 2021-01-07

## 2021-01-07 LAB — BKV DNA SPEC QL NAA+PROBE: NOT DETECTED COPIES/ML

## 2021-01-08 LAB
ANION GAP SERPL CALC-SCNC: 13 MMOL/L
BUN SERPL-MCNC: 51 MG/DL
CALCIUM SERPL-MCNC: 8.2 MG/DL
CHLORIDE SERPL-SCNC: 110 MMOL/L
CO2 SERPL-SCNC: 22 MMOL/L
CREAT SERPL-MCNC: 2.87 MG/DL
GLUCOSE SERPL-MCNC: 63 MG/DL
POTASSIUM SERPL-SCNC: 6 MMOL/L
SODIUM SERPL-SCNC: 145 MMOL/L
TACROLIMUS SERPL-MCNC: 8.9 NG/ML

## 2021-01-13 ENCOUNTER — APPOINTMENT (OUTPATIENT)
Dept: TRANSPLANT | Facility: CLINIC | Age: 76
End: 2021-01-13

## 2021-01-14 LAB
ALBUMIN SERPL ELPH-MCNC: 3.9 G/DL
ALP BLD-CCNC: 85 U/L
ALT SERPL-CCNC: 14 U/L
ANION GAP SERPL CALC-SCNC: 10 MMOL/L
APPEARANCE: CLEAR
AST SERPL-CCNC: 18 U/L
BACTERIA: NEGATIVE
BASOPHILS # BLD AUTO: 0.07 K/UL
BASOPHILS NFR BLD AUTO: 1.3 %
BILIRUB SERPL-MCNC: 0.4 MG/DL
BILIRUBIN URINE: NEGATIVE
BLOOD URINE: NEGATIVE
BUN SERPL-MCNC: 20 MG/DL
CALCIUM SERPL-MCNC: 9.2 MG/DL
CHLORIDE SERPL-SCNC: 106 MMOL/L
CMV DNA SPEC QL NAA+PROBE: ABNORMAL IU/ML
CO2 SERPL-SCNC: 24 MMOL/L
COLOR: YELLOW
CREAT SERPL-MCNC: 1.69 MG/DL
CREAT SPEC-SCNC: 140 MG/DL
CREAT/PROT UR: 0.1 RATIO
EOSINOPHIL # BLD AUTO: 0.29 K/UL
EOSINOPHIL NFR BLD AUTO: 5.2 %
GLUCOSE QUALITATIVE U: NEGATIVE
GLUCOSE SERPL-MCNC: 119 MG/DL
HCT VFR BLD CALC: 38.3 %
HGB BLD-MCNC: 11.2 G/DL
HYALINE CASTS: 0 /LPF
IMM GRANULOCYTES NFR BLD AUTO: 0.5 %
KETONES URINE: NEGATIVE
LDH SERPL-CCNC: 228 U/L
LEUKOCYTE ESTERASE URINE: NEGATIVE
LYMPHOCYTES # BLD AUTO: 1.1 K/UL
LYMPHOCYTES NFR BLD AUTO: 19.6 %
MAGNESIUM SERPL-MCNC: 2 MG/DL
MAN DIFF?: NORMAL
MCHC RBC-ENTMCNC: 29.2 GM/DL
MCHC RBC-ENTMCNC: 29.3 PG
MCV RBC AUTO: 100.3 FL
MICROSCOPIC-UA: NORMAL
MONOCYTES # BLD AUTO: 0.73 K/UL
MONOCYTES NFR BLD AUTO: 13 %
NEUTROPHILS # BLD AUTO: 3.38 K/UL
NEUTROPHILS NFR BLD AUTO: 60.4 %
NITRITE URINE: NEGATIVE
PH URINE: 6.5
PHOSPHATE SERPL-MCNC: 2.7 MG/DL
PLATELET # BLD AUTO: 219 K/UL
POTASSIUM SERPL-SCNC: 5.4 MMOL/L
PROT SERPL-MCNC: 6.2 G/DL
PROT UR-MCNC: 20 MG/DL
PROTEIN URINE: NORMAL
RBC # BLD: 3.82 M/UL
RBC # FLD: 12.8 %
RED BLOOD CELLS URINE: 2 /HPF
SODIUM SERPL-SCNC: 141 MMOL/L
SPECIFIC GRAVITY URINE: 1.02
SQUAMOUS EPITHELIAL CELLS: 1 /HPF
TACROLIMUS SERPL-MCNC: 5 NG/ML
URATE SERPL-MCNC: 8.6 MG/DL
UROBILINOGEN URINE: NORMAL
WBC # FLD AUTO: 5.6 K/UL
WHITE BLOOD CELLS URINE: 1 /HPF

## 2021-01-19 ENCOUNTER — RX RENEWAL (OUTPATIENT)
Age: 76
End: 2021-01-19

## 2021-01-28 ENCOUNTER — APPOINTMENT (OUTPATIENT)
Dept: NEPHROLOGY | Facility: CLINIC | Age: 76
End: 2021-01-28

## 2021-01-28 ENCOUNTER — APPOINTMENT (OUTPATIENT)
Dept: TRANSPLANT | Facility: CLINIC | Age: 76
End: 2021-01-28

## 2021-01-29 ENCOUNTER — NON-APPOINTMENT (OUTPATIENT)
Age: 76
End: 2021-01-29

## 2021-01-29 LAB
ALBUMIN SERPL ELPH-MCNC: 3.9 G/DL
ALP BLD-CCNC: 92 U/L
ALT SERPL-CCNC: 10 U/L
ANION GAP SERPL CALC-SCNC: 14 MMOL/L
APPEARANCE: CLEAR
AST SERPL-CCNC: 16 U/L
BACTERIA: NEGATIVE
BASOPHILS # BLD AUTO: 0.05 K/UL
BASOPHILS NFR BLD AUTO: 0.7 %
BILIRUB SERPL-MCNC: 0.3 MG/DL
BILIRUBIN URINE: NEGATIVE
BLOOD URINE: ABNORMAL
BUN SERPL-MCNC: 35 MG/DL
CALCIUM SERPL-MCNC: 8.6 MG/DL
CALCIUM SERPL-MCNC: 8.6 MG/DL
CHLORIDE SERPL-SCNC: 103 MMOL/L
CO2 SERPL-SCNC: 24 MMOL/L
COLOR: YELLOW
CREAT SERPL-MCNC: 2.12 MG/DL
CREAT SPEC-SCNC: 98 MG/DL
CREAT/PROT UR: 0.1 RATIO
EOSINOPHIL # BLD AUTO: 0.28 K/UL
EOSINOPHIL NFR BLD AUTO: 4.1 %
GLUCOSE QUALITATIVE U: NEGATIVE
GLUCOSE SERPL-MCNC: 65 MG/DL
HCT VFR BLD CALC: 39.7 %
HGB BLD-MCNC: 11.4 G/DL
HYALINE CASTS: 1 /LPF
IMM GRANULOCYTES NFR BLD AUTO: 0.3 %
KETONES URINE: NEGATIVE
LDH SERPL-CCNC: 173 U/L
LEUKOCYTE ESTERASE URINE: NEGATIVE
LYMPHOCYTES # BLD AUTO: 1.33 K/UL
LYMPHOCYTES NFR BLD AUTO: 19.6 %
MAGNESIUM SERPL-MCNC: 1.9 MG/DL
MAN DIFF?: NORMAL
MCHC RBC-ENTMCNC: 28.7 GM/DL
MCHC RBC-ENTMCNC: 28.8 PG
MCV RBC AUTO: 100.3 FL
MICROSCOPIC-UA: NORMAL
MONOCYTES # BLD AUTO: 1 K/UL
MONOCYTES NFR BLD AUTO: 14.7 %
NEUTROPHILS # BLD AUTO: 4.1 K/UL
NEUTROPHILS NFR BLD AUTO: 60.6 %
NITRITE URINE: NEGATIVE
PARATHYROID HORMONE INTACT: 86 PG/ML
PH URINE: 5.5
PHOSPHATE SERPL-MCNC: 3.8 MG/DL
PLATELET # BLD AUTO: 203 K/UL
POTASSIUM SERPL-SCNC: 4.7 MMOL/L
PROT SERPL-MCNC: 6.3 G/DL
PROT UR-MCNC: 12 MG/DL
PROTEIN URINE: NORMAL
RBC # BLD: 3.96 M/UL
RBC # FLD: 12.9 %
RED BLOOD CELLS URINE: 68 /HPF
SODIUM SERPL-SCNC: 142 MMOL/L
SPECIFIC GRAVITY URINE: 1.02
SQUAMOUS EPITHELIAL CELLS: 0 /HPF
TACROLIMUS SERPL-MCNC: 5.4 NG/ML
URATE SERPL-MCNC: 9.3 MG/DL
UROBILINOGEN URINE: NORMAL
WBC # FLD AUTO: 6.78 K/UL
WHITE BLOOD CELLS URINE: 5 /HPF

## 2021-02-01 LAB
BKV DNA SPEC QL NAA+PROBE: NOT DETECTED COPIES/ML
CMV DNA SPEC QL NAA+PROBE: NOT DETECTED IU/ML

## 2021-02-03 ENCOUNTER — APPOINTMENT (OUTPATIENT)
Dept: NEPHROLOGY | Facility: CLINIC | Age: 76
End: 2021-02-03

## 2021-02-04 ENCOUNTER — APPOINTMENT (OUTPATIENT)
Dept: TRANSPLANT | Facility: CLINIC | Age: 76
End: 2021-02-04

## 2021-02-05 ENCOUNTER — NON-APPOINTMENT (OUTPATIENT)
Age: 76
End: 2021-02-05

## 2021-02-06 LAB
ALBUMIN SERPL ELPH-MCNC: 3.8 G/DL
ALP BLD-CCNC: 85 U/L
ALT SERPL-CCNC: 11 U/L
ANION GAP SERPL CALC-SCNC: 11 MMOL/L
APPEARANCE: CLEAR
AST SERPL-CCNC: 17 U/L
BACTERIA: NEGATIVE
BASOPHILS # BLD AUTO: 0.05 K/UL
BASOPHILS NFR BLD AUTO: 0.7 %
BILIRUB SERPL-MCNC: 0.4 MG/DL
BILIRUBIN URINE: NEGATIVE
BKV DNA SPEC QL NAA+PROBE: NOT DETECTED COPIES/ML
BLOOD URINE: NORMAL
BUN SERPL-MCNC: 32 MG/DL
CALCIUM SERPL-MCNC: 8.7 MG/DL
CALCIUM SERPL-MCNC: 8.7 MG/DL
CHLORIDE SERPL-SCNC: 106 MMOL/L
CO2 SERPL-SCNC: 25 MMOL/L
COLOR: YELLOW
CREAT SERPL-MCNC: 1.92 MG/DL
CREAT SPEC-SCNC: 97 MG/DL
CREAT/PROT UR: 0.1 RATIO
EOSINOPHIL # BLD AUTO: 0.26 K/UL
EOSINOPHIL NFR BLD AUTO: 3.4 %
GLUCOSE QUALITATIVE U: NEGATIVE
GLUCOSE SERPL-MCNC: 94 MG/DL
HCT VFR BLD CALC: 40.1 %
HGB BLD-MCNC: 11.8 G/DL
HYALINE CASTS: 0 /LPF
IMM GRANULOCYTES NFR BLD AUTO: 0.4 %
KETONES URINE: NEGATIVE
LDH SERPL-CCNC: 167 U/L
LEUKOCYTE ESTERASE URINE: NEGATIVE
LYMPHOCYTES # BLD AUTO: 1.25 K/UL
LYMPHOCYTES NFR BLD AUTO: 16.4 %
MAGNESIUM SERPL-MCNC: 1.9 MG/DL
MAN DIFF?: NORMAL
MCHC RBC-ENTMCNC: 29 PG
MCHC RBC-ENTMCNC: 29.4 GM/DL
MCV RBC AUTO: 98.5 FL
MICROSCOPIC-UA: NORMAL
MONOCYTES # BLD AUTO: 0.84 K/UL
MONOCYTES NFR BLD AUTO: 11 %
NEUTROPHILS # BLD AUTO: 5.21 K/UL
NEUTROPHILS NFR BLD AUTO: 68.1 %
NITRITE URINE: NEGATIVE
PARATHYROID HORMONE INTACT: 74 PG/ML
PH URINE: 6
PHOSPHATE SERPL-MCNC: 3 MG/DL
PLATELET # BLD AUTO: 182 K/UL
POTASSIUM SERPL-SCNC: 4.9 MMOL/L
PROT SERPL-MCNC: 6.4 G/DL
PROT UR-MCNC: 12 MG/DL
PROTEIN URINE: NEGATIVE
RBC # BLD: 4.07 M/UL
RBC # FLD: 12.9 %
RED BLOOD CELLS URINE: 2 /HPF
SODIUM SERPL-SCNC: 142 MMOL/L
SPECIFIC GRAVITY URINE: 1.02
SQUAMOUS EPITHELIAL CELLS: 0 /HPF
TACROLIMUS SERPL-MCNC: 4.5 NG/ML
URATE SERPL-MCNC: 8.1 MG/DL
UROBILINOGEN URINE: NORMAL
WBC # FLD AUTO: 7.64 K/UL
WHITE BLOOD CELLS URINE: 1 /HPF

## 2021-02-09 ENCOUNTER — APPOINTMENT (OUTPATIENT)
Dept: NEPHROLOGY | Facility: CLINIC | Age: 76
End: 2021-02-09
Payer: MEDICARE

## 2021-02-09 DIAGNOSIS — C34.90 MALIGNANT NEOPLASM OF UNSPECIFIED PART OF UNSPECIFIED BRONCHUS OR LUNG: ICD-10-CM

## 2021-02-09 PROCEDURE — 99214 OFFICE O/P EST MOD 30 MIN: CPT | Mod: 95

## 2021-02-09 RX ORDER — SODIUM ZIRCONIUM CYCLOSILICATE 10 G/10G
10 POWDER, FOR SUSPENSION ORAL DAILY
Qty: 3 | Refills: 11 | Status: DISCONTINUED | COMMUNITY
Start: 2021-01-08 | End: 2021-02-09

## 2021-02-09 RX ORDER — MAGNESIUM OXIDE 241.3 MG/1000MG
400 TABLET ORAL TWICE DAILY
Qty: 180 | Refills: 1 | Status: DISCONTINUED | COMMUNITY
Start: 2019-02-28 | End: 2021-02-09

## 2021-02-09 RX ORDER — DIBASIC SODIUM PHOSPHATE, MONOBASIC POTASSIUM PHOSPHATE AND MONOBASIC SODIUM PHOSPHATE 852; 155; 130 MG/1; MG/1; MG/1
155-852-130 TABLET ORAL
Qty: 60 | Refills: 1 | Status: DISCONTINUED | COMMUNITY
Start: 2020-02-25 | End: 2021-02-09

## 2021-02-09 NOTE — ASSESSMENT
[FreeTextEntry1] : Clinical Impression:\par Kidney Transplant recipient, functioning allograft, elevated creatinine. On lasix, no edema, not on metoprolol blood pressure borderline elevated. Has local follow up since he is unable to visit us. He is advised to consult his local physician Dr. Wheatley whom he is seeing frequently regarding lasix/ antihypertensive adjustments. Gave mu contact information including cell no. Also need to get transplant lab work done locally. Will arrange.\par Lung Cancer, s/p surgery- reported cancer free not scheduled for chemo or radiation. now being treated for anemia by hematology/oncology team.\par \par Immunosuppression- Reviewed . On modified immunosuppression in view of recent diagnosis of squamous cell lung cancer.  Will switch to MTOR  based therapy once established regular follow up and lab draw if remains . He is not proteinuric. He currently has a cold  and once it resolves will change to Tac 1/1, and add Rapammune 2 mg/day, eventually to switch to Rapammune+ prednisone alone with sirolimus level target6-8 ng/ml\par He had follow up PET scan in January and was told he had spots in his lung. Was told he will have repeat study in April (every 3 months)\par Follow up in November.\par DM Controlled.\par \par Plan:\par Immunosuppression reviewed, noted reported creatinine\par Will repeat labs locally.\par Continue current medications\par additional changes after next visit\par Labs and follow up visit to be scheduled.\par Discussed COVID infection prevention strategies including handwashing, social distancing and monitoring for any signs or symptoms.\par \par Follow up: Labs and follow up every 4 weeks.

## 2021-02-09 NOTE — HISTORY OF PRESENT ILLNESS
[FreeTextEntry1] : This visit is provided by telehealth using real time 2 way communication technology. This patient is located at home and the provider is located at the [facility] medical office at [address] [place] [address]. Patient  participated in this visit.\par Verbal consent for this visit was given by patient.\par Total duration of this visit which included conversation, lab review, medication review and clinical assessment and advice lasted approximately 25-30 minutes.\par \par Patient reports he is now cancer free after lung cancer resection (4/30/20),   at [place]. \par Notably he was readmitted for post op pneumonia, reported COVID multiple times negative and had A fib, on eliquis now, has had home PT now able to walk and water his plants and walks [place]o the club [place]..\par Home vital signs: Weight 228 Lbs, blood pressure: 128/68  Glucose controlled.AM: 114 mg/dl \par Has been driving, independent for ADL.\par \par Currently taking Tacrolimus 2 mg twice daily and prednisone 5 mg daily for immunosuppression. \par \par Currently:\par \par Patient feels tired overall. Fels his symptoms are from anemia and the lung resection surgery. He has no ankle swelling but is taking lasix 40 mg daily. \par Oncologist Dr. Wheatley is treating him ()\par He is following him, being followed for spots on his lung.\par \par Last labs reviewed.\par Reviewed for acute symptoms-currently has eye symptoms and recd injections.\par Reports no acute pain.\par Taking precautions to prevent COVID exposure\par I reviewed current home  blood pressure and home glucose control and medications.\par \par \par On Telehealth visit:\par Patient appears comfortable\par No acute distress, not dyspneic\par Conscious, alert, oriented X 3\par Speech: Normal\par Other observations- He is ambulatory\par Reviewed last lab data \par \par He plans to see Nephrologist in Dr Jones's old office- Dr. Núñez. [Home] : at home, [unfilled] , at the time of the visit. [Medical Office: ([facility])___] : at the medical office located in  [Verbal consent obtained from patient] : the patient, [unfilled]

## 2021-02-09 NOTE — REASON FOR VISIT
[Follow-Up] : a follow-up visit [FreeTextEntry1] : post transplant follow up- had  eye injection from ophthalmologist

## 2021-02-16 ENCOUNTER — APPOINTMENT (OUTPATIENT)
Dept: TRANSPLANT | Facility: CLINIC | Age: 76
End: 2021-02-16

## 2021-03-08 ENCOUNTER — APPOINTMENT (OUTPATIENT)
Dept: NEPHROLOGY | Facility: CLINIC | Age: 76
End: 2021-03-08
Payer: MEDICARE

## 2021-03-08 VITALS
HEART RATE: 84 BPM | DIASTOLIC BLOOD PRESSURE: 68 MMHG | WEIGHT: 235 LBS | BODY MASS INDEX: 32.78 KG/M2 | SYSTOLIC BLOOD PRESSURE: 120 MMHG | RESPIRATION RATE: 14 BRPM

## 2021-03-08 PROCEDURE — 99214 OFFICE O/P EST MOD 30 MIN: CPT

## 2021-03-08 NOTE — HISTORY OF PRESENT ILLNESS
[FreeTextEntry1] : Patient had lung resection in April.\par No exposure to COVID\par Currently feels well.\par Had lung Ca resection in April on 30 th.\par Has been on f/u with Dr. Wheatley. Oncology at [place].\par Has scan scheduled on April 30 th. 2021\par \par On low dose Tac and prednisone.\par \par Home glucose level 78 mg/dl, in AM\par Lives alone at [facility]\par \par He had one vaccine dose (Moderna) . Next due on April 1st.

## 2021-03-08 NOTE — PHYSICAL EXAM
[General Appearance - Alert] : alert [General Appearance - In No Acute Distress] : in no acute distress [Sclera] : the sclera and conjunctiva were normal [PERRL With Normal Accommodation] : pupils were equal in size, round, and reactive to light [Extraocular Movements] : extraocular movements were intact [Outer Ear] : the ears and nose were normal in appearance [Oropharynx] : the oropharynx was normal [Neck Appearance] : the appearance of the neck was normal [Neck Cervical Mass (___cm)] : no neck mass was observed [Jugular Venous Distention Increased] : there was no jugular-venous distention [Thyroid Diffuse Enlargement] : the thyroid was not enlarged [Thyroid Nodule] : there were no palpable thyroid nodules [Auscultation Breath Sounds / Voice Sounds] : lungs were clear to auscultation bilaterally [Heart Rate And Rhythm] : heart rate was normal and rhythm regular [Heart Sounds] : normal S1 and S2 [Heart Sounds Gallop] : no gallops [Murmurs] : no murmurs [Heart Sounds Pericardial Friction Rub] : no pericardial rub [Edema] : there was no peripheral edema [Bowel Sounds] : normal bowel sounds [Abdomen Soft] : soft [Abdomen Tenderness] : non-tender [Cervical Lymph Nodes Enlarged Posterior Bilaterally] : posterior cervical [Cervical Lymph Nodes Enlarged Anterior Bilaterally] : anterior cervical [Supraclavicular Lymph Nodes Enlarged Bilaterally] : supraclavicular [Axillary Lymph Nodes Enlarged Bilaterally] : axillary [Inguinal Lymph Nodes Enlarged Bilaterally] : inguinal [Involuntary Movements] : no involuntary movements were seen [FreeTextEntry1] : PD catheter has been removed [] : no rash [No Focal Deficits] : no focal deficits [Oriented To Time, Place, And Person] : oriented to person, place, and time [Impaired Insight] : insight and judgment were intact [Affect] : the affect was normal

## 2021-03-08 NOTE — ASSESSMENT
[FreeTextEntry1] : Renal Transplant recipient: Recent treatment for presumed rejection which improved\par Immunosuppression: reviewed; , on tac/MMF/prednisone, last Tac level noted; . He took meds prior to labs today.\par Medications updated.\par DM: On Insulin.  He will f/u with endocrinologist. Gualberto Salazar. Will continue to monitor and adjust treatment\par Hyperlipidemia: he was previously on Welchol had elevated LFT and joint/muscle pain with Lipitor. Now on Fish oil (Vascepa)\par Peptic ulcer: On protonix\par Lung Ca: s/p resection in April. Has repeat scan scheduled in April 2021. Will consider changing to MTORI if not proteinuric.\par Left knee arthritis: On f/u with orthopedics surgeon, Dr. Mcclain at [place].\par Infection prophylaxis: On Bactrim, Valcyte as well as GI prophylaxis.\par Discussed again Renal Preservation strategies including achieving optimal weight through calory restriction and regular exercise, daily exercise, sleep hygiene, optimized control of glucose, blood pressure, lipids, avoidance of NSAIDs, Low Na and Low animal protein diet and medication adherence.\par Follow up labs in 2 weeks if after labs switches to MTORi\par \par

## 2021-03-11 ENCOUNTER — NON-APPOINTMENT (OUTPATIENT)
Age: 76
End: 2021-03-11

## 2021-03-11 LAB
ALBUMIN SERPL ELPH-MCNC: 3.6 G/DL
ALP BLD-CCNC: 91 U/L
ALT SERPL-CCNC: 13 U/L
ANION GAP SERPL CALC-SCNC: 8 MMOL/L
APPEARANCE: CLEAR
AST SERPL-CCNC: 14 U/L
BACTERIA: NEGATIVE
BASOPHILS # BLD AUTO: 0.04 K/UL
BASOPHILS NFR BLD AUTO: 0.8 %
BILIRUB SERPL-MCNC: 0.2 MG/DL
BILIRUBIN URINE: NEGATIVE
BKV DNA SPEC QL NAA+PROBE: NEGATIVE COPIES/ML
BLOOD URINE: NEGATIVE
BUN SERPL-MCNC: 31 MG/DL
CALCIUM SERPL-MCNC: 8.3 MG/DL
CALCIUM SERPL-MCNC: 8.3 MG/DL
CHLORIDE SERPL-SCNC: 110 MMOL/L
CMV DNA SPEC QL NAA+PROBE: NOT DETECTED IU/ML
CO2 SERPL-SCNC: 27 MMOL/L
COLOR: COLORLESS
CREAT SERPL-MCNC: 2.01 MG/DL
CREAT SPEC-SCNC: 29 MG/DL
CREAT/PROT UR: NORMAL RATIO
EOSINOPHIL # BLD AUTO: 0.09 K/UL
EOSINOPHIL NFR BLD AUTO: 1.9 %
GLUCOSE QUALITATIVE U: NEGATIVE
GLUCOSE SERPL-MCNC: 109 MG/DL
HCT VFR BLD CALC: 37 %
HGB BLD-MCNC: 11 G/DL
HYALINE CASTS: 0 /LPF
IMM GRANULOCYTES NFR BLD AUTO: 0.2 %
KETONES URINE: NEGATIVE
LDH SERPL-CCNC: 162 U/L
LEUKOCYTE ESTERASE URINE: NEGATIVE
LOG 10 BK QUANTITATION PCR: NORMAL
LYMPHOCYTES # BLD AUTO: 0.65 K/UL
LYMPHOCYTES NFR BLD AUTO: 13.7 %
MAGNESIUM SERPL-MCNC: 2.2 MG/DL
MAN DIFF?: NORMAL
MCHC RBC-ENTMCNC: 29.7 GM/DL
MCHC RBC-ENTMCNC: 29.9 PG
MCV RBC AUTO: 100.5 FL
MICROSCOPIC-UA: NORMAL
MONOCYTES # BLD AUTO: 0.51 K/UL
MONOCYTES NFR BLD AUTO: 10.7 %
NEUTROPHILS # BLD AUTO: 3.46 K/UL
NEUTROPHILS NFR BLD AUTO: 72.7 %
NITRITE URINE: NEGATIVE
PARATHYROID HORMONE INTACT: 88 PG/ML
PH URINE: 6
PHOSPHATE SERPL-MCNC: 2.4 MG/DL
PLATELET # BLD AUTO: 185 K/UL
POTASSIUM SERPL-SCNC: 5.3 MMOL/L
PROT SERPL-MCNC: 5.9 G/DL
PROT UR-MCNC: <4 MG/DL
PROTEIN URINE: NEGATIVE
RBC # BLD: 3.68 M/UL
RBC # FLD: 13.3 %
RED BLOOD CELLS URINE: 0 /HPF
SODIUM SERPL-SCNC: 144 MMOL/L
SPECIFIC GRAVITY URINE: 1.01
SQUAMOUS EPITHELIAL CELLS: 0 /HPF
TACROLIMUS SERPL-MCNC: 11.7 NG/ML
URATE SERPL-MCNC: 10.4 MG/DL
UROBILINOGEN URINE: NORMAL
WBC # FLD AUTO: 4.76 K/UL
WHITE BLOOD CELLS URINE: 0 /HPF

## 2021-03-25 ENCOUNTER — APPOINTMENT (OUTPATIENT)
Dept: TRANSPLANT | Facility: CLINIC | Age: 76
End: 2021-03-25

## 2021-04-22 ENCOUNTER — APPOINTMENT (OUTPATIENT)
Dept: TRANSPLANT | Facility: CLINIC | Age: 76
End: 2021-04-22

## 2021-04-22 ENCOUNTER — LABORATORY RESULT (OUTPATIENT)
Age: 76
End: 2021-04-22

## 2021-04-23 LAB
ALBUMIN SERPL ELPH-MCNC: 3.8 G/DL
ALP BLD-CCNC: 96 U/L
ALT SERPL-CCNC: 11 U/L
ANION GAP SERPL CALC-SCNC: 10 MMOL/L
APPEARANCE: CLEAR
AST SERPL-CCNC: 16 U/L
BACTERIA: NEGATIVE
BASOPHILS # BLD AUTO: 0.04 K/UL
BASOPHILS NFR BLD AUTO: 0.7 %
BILIRUB SERPL-MCNC: 0.4 MG/DL
BILIRUBIN URINE: NEGATIVE
BLOOD URINE: NEGATIVE
BUN SERPL-MCNC: 20 MG/DL
CALCIUM SERPL-MCNC: 8.7 MG/DL
CHLORIDE SERPL-SCNC: 106 MMOL/L
CO2 SERPL-SCNC: 27 MMOL/L
COLOR: YELLOW
CREAT SERPL-MCNC: 1.42 MG/DL
CREAT SPEC-SCNC: 189 MG/DL
CREAT/PROT UR: 0.2 RATIO
EOSINOPHIL # BLD AUTO: 0.15 K/UL
EOSINOPHIL NFR BLD AUTO: 2.8 %
GLUCOSE QUALITATIVE U: NEGATIVE
GLUCOSE SERPL-MCNC: 111 MG/DL
HCT VFR BLD CALC: 36.7 %
HGB BLD-MCNC: 11.4 G/DL
HYALINE CASTS: 0 /LPF
IMM GRANULOCYTES NFR BLD AUTO: 0.4 %
KETONES URINE: NEGATIVE
LDH SERPL-CCNC: 191 U/L
LEUKOCYTE ESTERASE URINE: NEGATIVE
LYMPHOCYTES # BLD AUTO: 0.77 K/UL
LYMPHOCYTES NFR BLD AUTO: 14.1 %
MAGNESIUM SERPL-MCNC: 2.3 MG/DL
MAN DIFF?: NORMAL
MCHC RBC-ENTMCNC: 29.5 PG
MCHC RBC-ENTMCNC: 31.1 GM/DL
MCV RBC AUTO: 95.1 FL
MICROSCOPIC-UA: NORMAL
MONOCYTES # BLD AUTO: 0.64 K/UL
MONOCYTES NFR BLD AUTO: 11.7 %
NEUTROPHILS # BLD AUTO: 3.83 K/UL
NEUTROPHILS NFR BLD AUTO: 70.3 %
NITRITE URINE: NEGATIVE
PH URINE: 6
PHOSPHATE SERPL-MCNC: 2 MG/DL
PLATELET # BLD AUTO: 197 K/UL
POTASSIUM SERPL-SCNC: 4 MMOL/L
PROT SERPL-MCNC: 6.4 G/DL
PROT UR-MCNC: 33 MG/DL
PROTEIN URINE: ABNORMAL
RBC # BLD: 3.86 M/UL
RBC # FLD: 12.8 %
RED BLOOD CELLS URINE: 1 /HPF
SODIUM SERPL-SCNC: 142 MMOL/L
SPECIFIC GRAVITY URINE: 1.02
SQUAMOUS EPITHELIAL CELLS: 1 /HPF
TACROLIMUS SERPL-MCNC: <2 NG/ML
URATE SERPL-MCNC: 8 MG/DL
UROBILINOGEN URINE: NORMAL
WBC # FLD AUTO: 5.45 K/UL
WHITE BLOOD CELLS URINE: 2 /HPF

## 2021-04-26 LAB
BKV DNA SPEC QL NAA+PROBE: NEGATIVE COPIES/ML
LOG 10 BK QUANTITATION PCR: NORMAL

## 2021-04-26 NOTE — PATIENT PROFILE ADULT - BRADEN FRICTION AND SHEAR
Const: Awake, alert and oriented. In no acute distress. Well appearing.  HEENT: NC/AT. Moist mucous membranes.  Eyes: No scleral icterus. EOMI.  Neck:. Soft and supple. Full ROM without pain.  Cardiac: . +S1/S2. No murmurs. Peripheral pulses 2+ and symmetric. No LE edema.  Resp: Speaking in full sentences. No evidence of respiratory distress. No wheezes, rales or rhonchi.  Abd: Soft, non-tender, non-distended. Normal bowel sounds in all 4 quadrants. No guarding or rebound.  Back: Spine midline and non-tender. No CVAT.  MSK: Tenderness to DIP of left 2nd and 3rd finger, FROM in all extremities neurovasculary intact, radial pulse 2+, hand  5/5   Skin: Rayaunds phenomenon to 2nd 3rd left finger   Lymph: No cervical lymphadenopathy.  Neuro: Awake, alert & oriented x 3. Moves all extremities symmetrically.
(3) no apparent problem

## 2021-05-04 LAB
COVID-19 SPIKE DOMAIN ANTIBODY INTERPRETATION: NEGATIVE
SARS-COV-2 AB SERPL IA-ACNC: 0.4 U/ML

## 2021-05-07 ENCOUNTER — RX RENEWAL (OUTPATIENT)
Age: 76
End: 2021-05-07

## 2021-05-25 ENCOUNTER — LABORATORY RESULT (OUTPATIENT)
Age: 76
End: 2021-05-25

## 2021-05-27 LAB
ALBUMIN SERPL ELPH-MCNC: 3.5 G/DL
ALP BLD-CCNC: 107 U/L
ALT SERPL-CCNC: 16 U/L
ANION GAP SERPL CALC-SCNC: 11 MMOL/L
APPEARANCE: CLEAR
AST SERPL-CCNC: 19 U/L
BACTERIA: NEGATIVE
BASOPHILS # BLD AUTO: 0.06 K/UL
BASOPHILS NFR BLD AUTO: 1.3 %
BILIRUB SERPL-MCNC: 0.2 MG/DL
BILIRUBIN URINE: NEGATIVE
BLOOD URINE: NEGATIVE
BUN SERPL-MCNC: 27 MG/DL
CALCIUM SERPL-MCNC: 8.2 MG/DL
CALCIUM SERPL-MCNC: 8.2 MG/DL
CHLORIDE SERPL-SCNC: 109 MMOL/L
CO2 SERPL-SCNC: 26 MMOL/L
COLOR: YELLOW
CREAT SERPL-MCNC: 1.77 MG/DL
CREAT SPEC-SCNC: 125 MG/DL
CREAT/PROT UR: 0.1 RATIO
EOSINOPHIL # BLD AUTO: 0.21 K/UL
EOSINOPHIL NFR BLD AUTO: 4.5 %
GLUCOSE QUALITATIVE U: NEGATIVE
GLUCOSE SERPL-MCNC: 44 MG/DL
HCT VFR BLD CALC: 37.4 %
HGB BLD-MCNC: 11.1 G/DL
HYALINE CASTS: 0 /LPF
IMM GRANULOCYTES NFR BLD AUTO: 0.4 %
KETONES URINE: NEGATIVE
LDH SERPL-CCNC: 206 U/L
LEUKOCYTE ESTERASE URINE: NEGATIVE
LYMPHOCYTES # BLD AUTO: 1.18 K/UL
LYMPHOCYTES NFR BLD AUTO: 25.2 %
MAGNESIUM SERPL-MCNC: 2.1 MG/DL
MAN DIFF?: NORMAL
MCHC RBC-ENTMCNC: 29.3 PG
MCHC RBC-ENTMCNC: 29.7 GM/DL
MCV RBC AUTO: 98.7 FL
MICROSCOPIC-UA: NORMAL
MONOCYTES # BLD AUTO: 0.48 K/UL
MONOCYTES NFR BLD AUTO: 10.2 %
NEUTROPHILS # BLD AUTO: 2.74 K/UL
NEUTROPHILS NFR BLD AUTO: 58.4 %
NITRITE URINE: NEGATIVE
PARATHYROID HORMONE INTACT: 106 PG/ML
PH URINE: 6
PHOSPHATE SERPL-MCNC: 4.2 MG/DL
PLATELET # BLD AUTO: 204 K/UL
POTASSIUM SERPL-SCNC: 4.2 MMOL/L
PROT SERPL-MCNC: 6.4 G/DL
PROT UR-MCNC: 17 MG/DL
PROTEIN URINE: NORMAL
RBC # BLD: 3.79 M/UL
RBC # FLD: 13.3 %
RED BLOOD CELLS URINE: 1 /HPF
SODIUM SERPL-SCNC: 146 MMOL/L
SPECIFIC GRAVITY URINE: 1.02
SQUAMOUS EPITHELIAL CELLS: 0 /HPF
TACROLIMUS SERPL-MCNC: <2 NG/ML
URATE SERPL-MCNC: 9.4 MG/DL
UROBILINOGEN URINE: NORMAL
WBC # FLD AUTO: 4.69 K/UL
WHITE BLOOD CELLS URINE: 2 /HPF

## 2021-06-01 LAB
BKV DNA SPEC QL NAA+PROBE: NEGATIVE COPIES/ML
CMV DNA SPEC QL NAA+PROBE: NOT DETECTED IU/ML
LOG 10 BK QUANTITATION PCR: NORMAL

## 2021-06-10 NOTE — CONSULT NOTE ADULT - PROVIDER SPECIALTY LIST ADULT
Infectious Disease
HPI:  87 y/o F PMHx significant for early dementia, lymphedema of lower extremities, atrial fibrillation on Coumadin (last INR2.6), hx of proximal femur fracture in January 2021, Hx L hip long IMN with Dr. Kitchen 2017, hearing loss, cystitis, hx of HTN, spinal stenosis, anxiety was BIBA s/p witnessed fall at home with resultant severe bilateral leg pain and inability to ambulate. Of note the patient's daughter states that the patient has not had a bowel movement in 4 days therefore decided to administer a dose of Miralax at home. She subsequently attempted to assist patient to the bathroom at night with the assistance the patient's walker. Despite all the daughter's efforts the patient was reportedly too weak on her feet resulting in the patient's fall onto her knees. The patient's daughter states that the patient did not strike her head, or lose consciousness, but did note considerable ecchymoses overlying her bilateral knees and lower legs. The patient denies any numbness/tingling/burning in the BLLE. No other bone/joint complaints. At baseline the patient walks minimally at home with the use of a walker and assist. Occasionally uses her wheelchair.   Daughter reports a decline in her overall ambulatory status over the past year. Imaging in the ED revealed bilateral proximal 1/3 tibia fractures with displacement and possible left patella fracture. In the ED Orthopedics was consulted.   (04 Jun 2021 02:34)      Patient is a 88y old  Female who presents with a chief complaint of s/p fall c/o severe bilateral leg pain and inability to ambulate (06 Jun 2021 09:45) Pt found to have  Bilateral tib-fib fractures. Comminuted fractures of the proximal tibia and fibular shafts. Comminuted left patellar fracture.  Comminuted, mildly displaced fracture at the proximal left  tibia. Comminuted, impacted fracture at the left fibular neck.  Partiallyincluded anterior hematoma, Partially included hardware fixation of the left femur with the most distal interlocking screw backed out 0.4 cm laterally.  Anterior subcutaneous hemorrhage.      Consulted by Dr. Deepali De La Torre  for VTE prophylaxis, risk stratification, and anticoagulation management.    PAST MEDICAL & SURGICAL HISTORY:  Lymphedema of both lower extremities    Venous insufficiency    Monoclonal gammopathies    Paroxysmal atrial fibrillation on coumaidn    Acute cystitis without hematuria  septic  4/2016    Essential hypertension    Spinal stenosis    Spondyloarthritis    Vaginal prolapse    Kiana (hard of hearing), bilateral    Hypertension    S/P kyphoplasty  2014    S/P thyroidectomy  partial   1975    History of vaginal surgery    History of fracture of femur  hx of proximal femur fracture in January 2021    History of repair of left hip joint  Hx L hip long IMN with Dr. Kitchen 2017        FAMILY HISTORY:  No pertinent family history in first degree relatives        Interval Note:   6-6-2021 Pt seen at bedside on 3 [address], talking toher daughter Jazmine.  I spoke with daughter on phone and she states her mother normally take 3mg daily of coumadin but recently it was increased to 4.5mg once a week.  States her mother's plts are normally low about 100,000.  Mad her aware of her mothers labs and that today we will hold her ac and resume it tomorrow as long as her mother is stable. Pt is alert and oriented to person and place,  but not able to understand the information given to her.    6-7-2021 Pt seen at bedside on 3 [address], awake H&H still low no overt signs of bleeding, pending transfusion,  6-8-2021: pt seen at bedside, awake, pleasantly confused, H&H better after transfusion, INR still subtherapeutic will continue on Coumadin.  6-9-2021: pt seen at bedside, was really confused overnight, stays near nurse's station, H&H is stable , INR still subtherapeutic will increase Coumadin  6/10/21: Patient seen at bedside OOB to chair talking on phone to daughter Lina. Handed me the phone. Explained to both current INR and coumadin dosing. Daughter seems upset that her Mom may be dc'ed to rehab today and plans on appealing. She "Does not think she should go with her INR not in range."       IMPROVE VTE Individual Risk Assessment      [  ] Previous VTE                                                  3    [  ] Thrombophilia                                               2    [  ] Lower limb paralysis                                      2        (unable to hold up >15 seconds)      [  ] Current Cancer                                              2         (within 6 months)    [x  ] Immobilization > 24 hrs                                1    [  ] ICU/CCU stay > 24 hours                              1    [ x ] Age > 60                                                      1    IMPROVE VTE Score ___2______    IMPROVE Score 0-1: Low Risk, No VTE prophylaxis required for most patients, encourage ambulation.   IMPROVE Score 2-3: At risk, pharmacologic VTE prophylaxis is indicated for most patients (in the absence of a contraindication)  IMPROVE Score > or = 4: High Risk, pharmacologic VTE prophylaxis is indicated for most patients (in the absence of a contraindication)      [id number] Score: 5    IMPROVE Bleeding Risk Score:3.5    Falls Risk:   High (x  )  Mod (  )  Low (  )  crcl: 60        cr:   .66       BMI   23.1                Denies any personal or familial history of clotting or bleeding disorders.    Allergies    No Known Allergies    Intolerances        REVIEW OF SYSTEMS    (  )Fever	     (  )Constipation	(  )SOB				(  )Headache	(  )Dysuria  (  )Chills	     (  )Melena	(  )Dyspnea present on exertion	                    (  )Dizziness                    (  )Polyuria  (  )Nausea	     (  )Hematochezia	(  )Cough			                    (  )Syncope   	(  )Hematuria  (  )Vomiting    (  )Chest Pain	(  )Wheezing			(  )Weakness  (x) bl leg pain  (  )Diarrhea     (  )Palpitations	(  )Anorexia			( x )Myalgia    Pertinent positives in HPI and daily subjective.  All other ROS negative.    Vital Signs Last 24 Hrs  T(C): 36.3 (06-10-21 @ 08:57), Max: 37.1 (06-10-21 @ 05:00)  T(F): 97.3 (06-10-21 @ 08:57), Max: 98.8 (06-10-21 @ 05:00)  HR: 73 (06-10-21 @ 08:57) (73 - 88)  BP: 130/57 (06-10-21 @ 08:57) (123/45 - 136/79)  BP(mean): --  RR: 16 (06-10-21 @ 08:57) (16 - 18)  SpO2: 96% (06-10-21 @ 08:57) (95% - 96%)    PHYSICAL EXAM:    Constitutional: Appears Well    Neurological: Awake, confused    Skin: Warm    Respiratory and Thorax: normal effort; Breath sounds: normal; No rales/wheezing/rhonchi  	  Cardiovascular: S1, S2, regular, NMBR	    Gastrointestinal: BS + x 4Q, nontender	    Genitourinary:  Bladder nondistended, nontender    Musculoskeletal:   General Right:   no muscle/joint tenderness,   normal tone, no joint swelling,   ROM: limited	    General Left:   no muscle/joint tenderness,   normal tone, no joint swelling,   ROM: limited    Hip:  Right: Dressing CDI            Left: Dressing CDI    LE:  Right and left : Dressing CDI ace wrap with splinting, moving all toes, good capillary refill, +sensation     Lower extrems:   Right: no calf tenderness              negative christopher's sign               + pedal pulses    Left:   no calf tenderness              negative christopher's sign               + pedal pulses                          8.9    9.27  )-----------( 148      ( 10 Danny 2021 06:31 )             27.0       06-09    132<L>  |  101  |  19  ----------------------------<  88  4.4   |  26  |  0.66    Ca    8.6      09 Jun 2021 08:28        PT/INR - ( 10 Danny 2021 06:31 )   PT: 15.2 sec;   INR: 1.32 ratio         PTT - ( 10 Danny 2021 06:31 )  PTT:28.3 sec                            9.1    9.04  )-----------( 128      ( 09 Jun 2021 08:28 )             28.3       06-09    132<L>  |  101  |  19  ----------------------------<  88  4.4   |  26  |  0.66    Ca    8.6      09 Jun 2021 08:28         PTT - ( 09 Jun 2021 08:28 )  PTT:27.0 sec                    9.0    9.41  )-----------( 104      ( 08 Jun 2021 07:52 )             27.3       06-08    132<L>  |  101  |  18  ----------------------------<  92  4.1   |  26  |  0.60    Ca    8.6      08 Jun 2021 07:52    TPro  5.2<L>  /  Alb  2.6<L>  /  TBili  0.9  /  DBili  x   /  AST  37  /  ALT  37  /  AlkPhos  84  06-07              PTT - ( 07 Jun 2021 07:50 )  PTT:19.5 sec                      8.0    10.89 )-----------( 73       ( 07 Jun 2021 07:50 )             24.6     PT/INR - ( 10 Danny 2021 06:31 )   PT: 15.2 sec;   INR: 1.32 ratio    PT/INR - ( 09 Jun 2021 08:28 )   PT: 13.4 sec;   INR: 1.16 ratio    PT/INR - ( 08 Jun 2021 07:52 )   PT: 13.7 sec;   INR: 1.18 ratio   PT/INR - ( 07 Jun 2021 07:50 )   PT: 14.2 sec;   INR: 1.24 ratio      MEDICATIONS  (STANDING):  acetaminophen   Tablet .. 650 milliGRAM(s) Oral every 8 hours  losartan 25 milliGRAM(s) Oral daily  metoprolol succinate ER 25 milliGRAM(s) Oral daily  mirtazapine 15 milliGRAM(s) Oral at bedtime  polyethylene glycol 3350 17 Gram(s) Oral daily  senna 2 Tablet(s) Oral at bedtime  warfarin 6 milliGRAM(s) Oral once      < from: CT Knee No Cont, Right (06.04.21 @ 03:44) >  IMPRESSION:    Comminuted fractures of the proximal tibia and fibular shafts.  Anterior subcutaneous hemorrhage.    < end of copied text >  < from: CT Knee No Cont, Left (06.04.21 @ 03:41) >  Impression:  Comminuted patellar fracture.  Comminuted, mildly displaced fracture at the proximal tibia.  Comminuted, impacted fracture at the fibular neck.  Partiallyincluded anterior hematoma.  Partially included hardware fixation of the left femur with the most distal interlocking screw backed out 0.4 cm laterally.    < end of copied text >      DVT Prophylaxis:  LMWH                   (  )  Heparin SQ           (  )  Coumadin             (x )  Xarelto                  (  )  Eliquis                   (  )  Venodynes           (x )  Ambulation          (  )  UFH                       (  )  Contraindicated  (  )  EC Aspirin             (  )        
Nephrology

## 2022-01-01 ENCOUNTER — NON-APPOINTMENT (OUTPATIENT)
Age: 77
End: 2022-01-01

## 2022-01-01 ENCOUNTER — APPOINTMENT (OUTPATIENT)
Dept: TRANSPLANT | Facility: CLINIC | Age: 77
End: 2022-01-01

## 2022-01-01 ENCOUNTER — APPOINTMENT (OUTPATIENT)
Dept: OPHTHALMOLOGY | Facility: HOSPITAL | Age: 77
End: 2022-01-01

## 2022-01-01 ENCOUNTER — APPOINTMENT (OUTPATIENT)
Dept: OPHTHALMOLOGY | Facility: CLINIC | Age: 77
End: 2022-01-01

## 2022-01-01 ENCOUNTER — APPOINTMENT (OUTPATIENT)
Dept: OPHTHALMOLOGY | Facility: CLINIC | Age: 77
End: 2022-01-01
Payer: MEDICARE

## 2022-01-01 ENCOUNTER — APPOINTMENT (OUTPATIENT)
Dept: NEPHROLOGY | Facility: CLINIC | Age: 77
End: 2022-01-01
Payer: MEDICARE

## 2022-01-01 ENCOUNTER — RESULT REVIEW (OUTPATIENT)
Age: 77
End: 2022-01-01

## 2022-01-01 ENCOUNTER — APPOINTMENT (OUTPATIENT)
Dept: OPHTHALMOLOGY | Facility: HOSPITAL | Age: 77
End: 2022-01-01
Payer: MEDICARE

## 2022-01-01 ENCOUNTER — LABORATORY RESULT (OUTPATIENT)
Age: 77
End: 2022-01-01

## 2022-01-01 ENCOUNTER — RX RENEWAL (OUTPATIENT)
Age: 77
End: 2022-01-01

## 2022-01-01 DIAGNOSIS — I10 ESSENTIAL (PRIMARY) HYPERTENSION: ICD-10-CM

## 2022-01-01 DIAGNOSIS — Z94.0 OTHER LONG TERM (CURRENT) DRUG THERAPY: ICD-10-CM

## 2022-01-01 DIAGNOSIS — E11.9 TYPE 2 DIABETES MELLITUS W/OUT COMPLICATIONS: ICD-10-CM

## 2022-01-01 DIAGNOSIS — Z94.0 KIDNEY TRANSPLANT STATUS: ICD-10-CM

## 2022-01-01 DIAGNOSIS — Z79.899 OTHER LONG TERM (CURRENT) DRUG THERAPY: ICD-10-CM

## 2022-01-01 LAB
24R-OH-CALCIDIOL SERPL-MCNC: 30.5 PG/ML
25(OH)D3 SERPL-MCNC: 21.3 NG/ML
ALBUMIN SERPL ELPH-MCNC: 2.9 G/DL
ALBUMIN SERPL ELPH-MCNC: 3 G/DL
ALBUMIN SERPL ELPH-MCNC: 3.4 G/DL
ALBUMIN SERPL ELPH-MCNC: 3.8 G/DL
ALP BLD-CCNC: 100 U/L
ALP BLD-CCNC: 75 U/L
ALP BLD-CCNC: 90 U/L
ALP BLD-CCNC: 92 U/L
ALT SERPL-CCNC: 12 U/L
ALT SERPL-CCNC: 18 U/L
ALT SERPL-CCNC: 22 U/L
ALT SERPL-CCNC: 9 U/L
ANION GAP SERPL CALC-SCNC: 10 MMOL/L
ANION GAP SERPL CALC-SCNC: 12 MMOL/L
ANION GAP SERPL CALC-SCNC: 15 MMOL/L
ANION GAP SERPL CALC-SCNC: 6 MMOL/L
APPEARANCE: CLEAR
AST SERPL-CCNC: 18 U/L
AST SERPL-CCNC: 19 U/L
AST SERPL-CCNC: 23 U/L
AST SERPL-CCNC: 25 U/L
BACTERIA: ABNORMAL
BACTERIA: NEGATIVE
BACTERIA: NEGATIVE
BASOPHILS # BLD AUTO: 0.04 K/UL
BASOPHILS # BLD AUTO: 0.05 K/UL
BASOPHILS # BLD AUTO: 0.07 K/UL
BASOPHILS # BLD AUTO: 0.08 K/UL
BASOPHILS NFR BLD AUTO: 0.8 %
BASOPHILS NFR BLD AUTO: 1 %
BASOPHILS NFR BLD AUTO: 1.2 %
BASOPHILS NFR BLD AUTO: 1.3 %
BILIRUB SERPL-MCNC: 0.3 MG/DL
BILIRUB SERPL-MCNC: <0.2 MG/DL
BILIRUBIN URINE: NEGATIVE
BKV DNA SPEC QL NAA+PROBE: 104 COPIES/ML
BKV DNA SPEC QL NAA+PROBE: 51 COPIES/ML
BKV DNA SPEC QL NAA+PROBE: NOT DETECTED COPIES/ML
BLOOD URINE: NEGATIVE
BUN SERPL-MCNC: 14 MG/DL
BUN SERPL-MCNC: 14 MG/DL
BUN SERPL-MCNC: 17 MG/DL
BUN SERPL-MCNC: 22 MG/DL
CALCIUM SERPL-MCNC: 7.7 MG/DL
CALCIUM SERPL-MCNC: 7.7 MG/DL
CALCIUM SERPL-MCNC: 7.8 MG/DL
CALCIUM SERPL-MCNC: 7.8 MG/DL
CALCIUM SERPL-MCNC: 8.2 MG/DL
CALCIUM SERPL-MCNC: 8.2 MG/DL
CALCIUM SERPL-MCNC: 8.5 MG/DL
CHLORIDE SERPL-SCNC: 105 MMOL/L
CHLORIDE SERPL-SCNC: 107 MMOL/L
CHLORIDE SERPL-SCNC: 110 MMOL/L
CHLORIDE SERPL-SCNC: 114 MMOL/L
CHOLEST SERPL-MCNC: 133 MG/DL
CHOLEST SERPL-MCNC: 174 MG/DL
CMV DNA SPEC QL NAA+PROBE: NOT DETECTED IU/ML
CMV DNA SPEC QL NAA+PROBE: NOT DETECTED IU/ML
CO2 SERPL-SCNC: 22 MMOL/L
CO2 SERPL-SCNC: 23 MMOL/L
CO2 SERPL-SCNC: 23 MMOL/L
CO2 SERPL-SCNC: 24 MMOL/L
COLOR: YELLOW
COVID-19 SPIKE DOMAIN ANTIBODY INTERPRETATION: POSITIVE
COVID-19 SPIKE DOMAIN ANTIBODY INTERPRETATION: POSITIVE
CREAT SERPL-MCNC: 1.41 MG/DL
CREAT SERPL-MCNC: 1.42 MG/DL
CREAT SERPL-MCNC: 1.51 MG/DL
CREAT SERPL-MCNC: 1.59 MG/DL
CREAT SPEC-SCNC: 223 MG/DL
CREAT SPEC-SCNC: 69 MG/DL
CREAT SPEC-SCNC: 94 MG/DL
CREAT/PROT UR: 0.1 RATIO
CREAT/PROT UR: 0.2 RATIO
CREAT/PROT UR: 0.2 RATIO
EGFR: 44 ML/MIN/1.73M2
EGFR: 47 ML/MIN/1.73M2
EGFR: 51 ML/MIN/1.73M2
EOSINOPHIL # BLD AUTO: 0.06 K/UL
EOSINOPHIL # BLD AUTO: 0.12 K/UL
EOSINOPHIL # BLD AUTO: 0.13 K/UL
EOSINOPHIL # BLD AUTO: 0.17 K/UL
EOSINOPHIL NFR BLD AUTO: 1.2 %
EOSINOPHIL NFR BLD AUTO: 2.1 %
EOSINOPHIL NFR BLD AUTO: 2.5 %
EOSINOPHIL NFR BLD AUTO: 3 %
ESTIMATED AVERAGE GLUCOSE: 134 MG/DL
ESTIMATED AVERAGE GLUCOSE: 137 MG/DL
GLUCOSE QUALITATIVE U: NEGATIVE
GLUCOSE SERPL-MCNC: 111 MG/DL
GLUCOSE SERPL-MCNC: 127 MG/DL
GLUCOSE SERPL-MCNC: 148 MG/DL
GLUCOSE SERPL-MCNC: 163 MG/DL
HBA1C MFR BLD HPLC: 6.3 %
HBA1C MFR BLD HPLC: 6.4 %
HCT VFR BLD CALC: 29.5 %
HCT VFR BLD CALC: 30.5 %
HCT VFR BLD CALC: 34.6 %
HCT VFR BLD CALC: 38.5 %
HDLC SERPL-MCNC: 46 MG/DL
HDLC SERPL-MCNC: 66 MG/DL
HGB BLD-MCNC: 10.2 G/DL
HGB BLD-MCNC: 11.6 G/DL
HGB BLD-MCNC: 8.9 G/DL
HGB BLD-MCNC: 9.1 G/DL
HYALINE CASTS: 0 /LPF
HYALINE CASTS: 1 /LPF
HYALINE CASTS: 1 /LPF
IMM GRANULOCYTES NFR BLD AUTO: 0.2 %
IMM GRANULOCYTES NFR BLD AUTO: 0.3 %
IMM GRANULOCYTES NFR BLD AUTO: 0.3 %
IMM GRANULOCYTES NFR BLD AUTO: 0.4 %
KETONES URINE: NEGATIVE
KETONES URINE: NEGATIVE
KETONES URINE: NORMAL
LDH SERPL-CCNC: 150 U/L
LDH SERPL-CCNC: 179 U/L
LDLC SERPL CALC-MCNC: 69 MG/DL
LDLC SERPL CALC-MCNC: 86 MG/DL
LEUKOCYTE ESTERASE URINE: NEGATIVE
LYMPHOCYTES # BLD AUTO: 0.56 K/UL
LYMPHOCYTES # BLD AUTO: 0.74 K/UL
LYMPHOCYTES # BLD AUTO: 1.43 K/UL
LYMPHOCYTES # BLD AUTO: 1.59 K/UL
LYMPHOCYTES NFR BLD AUTO: 11.2 %
LYMPHOCYTES NFR BLD AUTO: 15.3 %
LYMPHOCYTES NFR BLD AUTO: 22.6 %
LYMPHOCYTES NFR BLD AUTO: 27.6 %
MAGNESIUM SERPL-MCNC: 1.6 MG/DL
MAGNESIUM SERPL-MCNC: 1.6 MG/DL
MAGNESIUM SERPL-MCNC: 1.7 MG/DL
MAGNESIUM SERPL-MCNC: 1.8 MG/DL
MAN DIFF?: NORMAL
MCHC RBC-ENTMCNC: 27.1 PG
MCHC RBC-ENTMCNC: 27.5 PG
MCHC RBC-ENTMCNC: 28.1 PG
MCHC RBC-ENTMCNC: 29.5 GM/DL
MCHC RBC-ENTMCNC: 29.8 GM/DL
MCHC RBC-ENTMCNC: 30.1 GM/DL
MCHC RBC-ENTMCNC: 30.2 GM/DL
MCHC RBC-ENTMCNC: 30.8 PG
MCV RBC AUTO: 103.4 FL
MCV RBC AUTO: 91 FL
MCV RBC AUTO: 92 FL
MCV RBC AUTO: 93.2 FL
MICROSCOPIC-UA: NORMAL
MONOCYTES # BLD AUTO: 0.28 K/UL
MONOCYTES # BLD AUTO: 0.32 K/UL
MONOCYTES # BLD AUTO: 0.46 K/UL
MONOCYTES # BLD AUTO: 0.52 K/UL
MONOCYTES NFR BLD AUTO: 5.6 %
MONOCYTES NFR BLD AUTO: 6.6 %
MONOCYTES NFR BLD AUTO: 8 %
MONOCYTES NFR BLD AUTO: 8.2 %
NEUTROPHILS # BLD AUTO: 3.45 K/UL
NEUTROPHILS # BLD AUTO: 3.61 K/UL
NEUTROPHILS # BLD AUTO: 4.03 K/UL
NEUTROPHILS # BLD AUTO: 4.16 K/UL
NEUTROPHILS NFR BLD AUTO: 59.9 %
NEUTROPHILS NFR BLD AUTO: 65.5 %
NEUTROPHILS NFR BLD AUTO: 74.4 %
NEUTROPHILS NFR BLD AUTO: 80.8 %
NITRITE URINE: NEGATIVE
NONHDLC SERPL-MCNC: 107 MG/DL
NONHDLC SERPL-MCNC: 87 MG/DL
PARATHYROID HORMONE INTACT: 117 PG/ML
PARATHYROID HORMONE INTACT: 77 PG/ML
PARATHYROID HORMONE INTACT: 88 PG/ML
PH URINE: 5.5
PH URINE: 6
PH URINE: 6
PHOSPHATE SERPL-MCNC: 2.4 MG/DL
PHOSPHATE SERPL-MCNC: 2.4 MG/DL
PHOSPHATE SERPL-MCNC: 2.7 MG/DL
PHOSPHATE SERPL-MCNC: 3 MG/DL
PLATELET # BLD AUTO: 183 K/UL
PLATELET # BLD AUTO: 195 K/UL
PLATELET # BLD AUTO: 201 K/UL
PLATELET # BLD AUTO: 212 K/UL
POTASSIUM SERPL-SCNC: 3.7 MMOL/L
POTASSIUM SERPL-SCNC: 4 MMOL/L
POTASSIUM SERPL-SCNC: 5.5 MMOL/L
POTASSIUM SERPL-SCNC: 5.7 MMOL/L
PROT SERPL-MCNC: 5.4 G/DL
PROT SERPL-MCNC: 5.5 G/DL
PROT SERPL-MCNC: 5.9 G/DL
PROT SERPL-MCNC: 6.5 G/DL
PROT UR-MCNC: 12 MG/DL
PROT UR-MCNC: 13 MG/DL
PROT UR-MCNC: 40 MG/DL
PROTEIN URINE: NEGATIVE
PROTEIN URINE: NORMAL
PROTEIN URINE: NORMAL
RBC # BLD: 2.95 M/UL
RBC # BLD: 3.24 M/UL
RBC # BLD: 3.76 M/UL
RBC # BLD: 4.13 M/UL
RBC # FLD: 14.8 %
RBC # FLD: 16 %
RBC # FLD: 16.4 %
RBC # FLD: 17.3 %
RED BLOOD CELLS URINE: 0 /HPF
RED BLOOD CELLS URINE: 1 /HPF
RED BLOOD CELLS URINE: 2 /HPF
SARS-COV-2 AB SERPL IA-ACNC: >250 U/ML
SARS-COV-2 AB SERPL IA-ACNC: >250 U/ML
SIROLIMUS BLD-MCNC: 3.7 NG/ML
SIROLIMUS BLD-MCNC: 6.2 NG/ML
SIROLIMUS BLD-MCNC: <2 NG/ML
SODIUM SERPL-SCNC: 143 MMOL/L
SPECIFIC GRAVITY URINE: 1.01
SPECIFIC GRAVITY URINE: 1.02
SPECIFIC GRAVITY URINE: 1.03
SQUAMOUS EPITHELIAL CELLS: 0 /HPF
SQUAMOUS EPITHELIAL CELLS: 0 /HPF
SQUAMOUS EPITHELIAL CELLS: 1 /HPF
TACROLIMUS SERPL-MCNC: 10.2 NG/ML
TACROLIMUS SERPL-MCNC: 3.7 NG/ML
TACROLIMUS SERPL-MCNC: <2 NG/ML
TRIGL SERPL-MCNC: 105 MG/DL
TRIGL SERPL-MCNC: 91 MG/DL
URATE SERPL-MCNC: 6.3 MG/DL
URATE SERPL-MCNC: 7.5 MG/DL
URATE SERPL-MCNC: 7.9 MG/DL
UROBILINOGEN URINE: NORMAL
WBC # FLD AUTO: 4.85 K/UL
WBC # FLD AUTO: 4.99 K/UL
WBC # FLD AUTO: 5.76 K/UL
WBC # FLD AUTO: 6.34 K/UL
WHITE BLOOD CELLS URINE: 1 /HPF
WHITE BLOOD CELLS URINE: 1 /HPF
WHITE BLOOD CELLS URINE: 4 /HPF

## 2022-01-01 PROCEDURE — 99024 POSTOP FOLLOW-UP VISIT: CPT

## 2022-01-01 PROCEDURE — 92014 COMPRE OPH EXAM EST PT 1/>: CPT

## 2022-01-01 PROCEDURE — 66984 XCAPSL CTRC RMVL W/O ECP: CPT | Mod: LT

## 2022-01-01 PROCEDURE — 92134 CPTRZ OPH DX IMG PST SGM RTA: CPT

## 2022-01-01 PROCEDURE — 92136 OPHTHALMIC BIOMETRY: CPT

## 2022-01-01 PROCEDURE — 99214 OFFICE O/P EST MOD 30 MIN: CPT | Mod: 95

## 2022-01-01 PROCEDURE — 92004 COMPRE OPH EXAM NEW PT 1/>: CPT

## 2022-01-01 RX ORDER — LEVOTHYROXINE SODIUM 0.03 MG/1
25 TABLET ORAL
Qty: 30 | Refills: 11 | Status: ACTIVE | COMMUNITY
Start: 2018-09-28 | End: 1900-01-01

## 2022-01-01 RX ORDER — SODIUM BICARBONATE 650 MG/1
650 TABLET ORAL TWICE DAILY
Qty: 120 | Refills: 11 | Status: ACTIVE | COMMUNITY
Start: 2019-02-12 | End: 1900-01-01

## 2022-01-01 RX ORDER — SIROLIMUS 2 MG/1
2 TABLET, FILM COATED ORAL DAILY
Qty: 60 | Refills: 11 | Status: ACTIVE | COMMUNITY
Start: 2021-03-11 | End: 1900-01-01

## 2022-01-01 RX ORDER — SULFAMETHOXAZOLE AND TRIMETHOPRIM 400; 80 MG/1; MG/1
400-80 TABLET ORAL
Qty: 90 | Refills: 3 | Status: ACTIVE | COMMUNITY
Start: 2019-02-05 | End: 1900-01-01

## 2022-01-01 RX ORDER — PREDNISONE 5 MG/1
5 TABLET ORAL
Qty: 90 | Refills: 3 | Status: ACTIVE | COMMUNITY
Start: 2019-02-05 | End: 1900-01-01

## 2022-01-01 RX ORDER — PANTOPRAZOLE 40 MG/1
40 TABLET, DELAYED RELEASE ORAL
Qty: 60 | Refills: 11 | Status: ACTIVE | COMMUNITY
Start: 2019-01-24 | End: 1900-01-01

## 2022-01-01 RX ORDER — TACROLIMUS 0.5 MG/1
0.5 CAPSULE ORAL
Qty: 180 | Refills: 3 | Status: DISCONTINUED | COMMUNITY
Start: 2021-01-01 | End: 2022-01-01

## 2022-01-01 RX ORDER — LEVOTHYROXINE SODIUM 0.2 MG/1
200 TABLET ORAL DAILY
Qty: 30 | Refills: 11 | Status: ACTIVE | COMMUNITY
Start: 2018-09-28 | End: 1900-01-01

## 2022-01-01 RX ORDER — TACROLIMUS 1 MG/1
1 CAPSULE ORAL TWICE DAILY
Qty: 540 | Refills: 3 | Status: ACTIVE | COMMUNITY
Start: 2020-09-06 | End: 1900-01-01

## 2022-01-01 RX ORDER — FOLIC ACID/VIT B COMPLEX AND C 0.8 MG
TABLET ORAL
Qty: 90 | Refills: 3 | Status: ACTIVE | COMMUNITY
Start: 2022-01-01 | End: 1900-01-01

## 2022-01-01 RX ORDER — FUROSEMIDE 40 MG/1
40 TABLET ORAL
Qty: 90 | Refills: 3 | Status: ACTIVE | COMMUNITY
Start: 2019-02-19 | End: 1900-01-01

## 2022-01-01 RX ORDER — GABAPENTIN 300 MG/1
300 CAPSULE ORAL
Qty: 90 | Refills: 3 | Status: ACTIVE | COMMUNITY
Start: 2019-12-18 | End: 1900-01-01

## 2022-06-23 PROBLEM — I10 HYPERTENSION: Status: ACTIVE | Noted: 2017-05-16

## 2022-06-23 PROBLEM — Z94.0 KIDNEY TRANSPLANT RECIPIENT: Status: ACTIVE | Noted: 2019-02-05

## 2022-06-23 PROBLEM — Z79.899 IMMUNOSUPPRESSIVE MANAGEMENT ENCOUNTER FOLLOWING KIDNEY TRANSPLANT: Status: ACTIVE | Noted: 2019-02-12

## 2022-06-23 PROBLEM — E11.9 DIABETES MELLITUS: Status: ACTIVE | Noted: 2017-05-16

## 2022-06-23 NOTE — HISTORY OF PRESENT ILLNESS
[FreeTextEntry1] : This visit is provided by telehealth using real time 2 way communication technology. This patient is located at home and the provider is located at the [facility] medical office at [address] [place] [address]. Patient  participated in this visit.\par Verbal consent for this visit was given by patient.\par Total duration of this visit which included conversation, lab review, medication review and clinical assessment and advice lasted approximately 25-30 minutes.\par \par Patient reports he is now feeling better, able to drive, walks with [place]. \par Home vital signs: Weight 205 Lbs, blood pressure: 120/70  Glucose controlled.AM: 114 mg/dl \par Has been   independent for ADL. Has a house keeper to help him.\par \par Currently taking Tacrolimus 3 mg twice daily and prednisone 5 mg daily for immunosuppression. \par \par Currently:\par \par Patient feels tired overall. Fels his symptoms are from anemia and the lung resection surgery. He has no ankle swelling but is taking lasix 40 mg daily. \par \par Oncologist Dr. Wheatley is treating him ()\par He is following him, being followed for spots on his lung.\par \par Last labs reviewed.\par Reviewed for acute symptoms-currently has none, on neurontin for neuropathic pain\par Reports no acute pain.\par Taking precautions to prevent COVID exposure\par I reviewed current  medications.\par \par \par On Telehealth visit:\par Patient appears comfortable\par No acute distress, not dyspneic\par Conscious, alert, oriented X 3\par Speech: Normal\par Other observations- He is ambulatory\par Reviewed last lab data from May 2022\par \par He plans to see Nephrologist Dr. Núñez [Home] : at home, [unfilled] , at the time of the visit. [Medical Office: ([facility])___] : at the medical office located in  [Verbal consent obtained from patient] : the patient, [unfilled]

## 2022-06-23 NOTE — ASSESSMENT
[FreeTextEntry1] : Clinical Impression:\par Kidney Transplant recipient, functioning allograft, elevated creatinine. On lasix, no edema, not on metoprolol blood pressure borderline elevated. Has local follow up since he is unable to visit us. He is advised to consult his local physician Dr. Wheatley whom he is seeing frequently regarding lasix/ antihypertensive adjustments. Gave mu contact information including cell no. Also need to get transplant lab work done locally. Will arrange.\par Lung Cancer, s/p surgery- reported cancer free not scheduled for chemo or radiation. now being treated for anemia by hematology/oncology team. Has repeat PET scan next week.\par Immunosuppression- Reviewed . On modified immunosuppression in view of  diagnosis of squamous cell lung cancer.  \par DM Controlled.\par \par Plan:\par Immunosuppression reviewed, noted reported creatinine\par Will repeat labs locally.\par Continue current medications\par additional changes after next visit\par Labs and follow up visit to be scheduled.\par Discussed COVID infection prevention strategies including handwashing, social distancing and monitoring for any signs or symptoms.\par Follow up: Labs and follow up every 4 weeks.

## 2022-12-13 ENCOUNTER — APPOINTMENT (OUTPATIENT)
Dept: OPHTHALMOLOGY | Facility: CLINIC | Age: 77
End: 2022-12-13

## 2023-03-23 NOTE — PATIENT PROFILE ADULT - HAS THE PATIENT BEEN ADMITTED FROM SHORT TERM REHAB?
reach me, try the office phone number first - [phone]. If you cannot reach me at the office call medical dental at [phone]. Thank you,    Jayde Adames.  Jessica Disla M.D. no

## 2024-09-06 NOTE — DISCHARGE NOTE ADULT - PAIN LEVEL
"Principal Problem:Open knee wound    Principal Orthopedic Problem: as above; now s/p I&D (9/3)    Interval History: POD1, afebrile overnight.  Pain has been reportedly well controlled. Currently receiving Cipro, cultures ngtd       Review of patient's allergies indicates:   Allergen Reactions    Hydrocortisone Rash     To the cream       Current Facility-Administered Medications   Medication    acetaminophen tablet 650 mg    ciprofloxacin HCl tablet 500 mg    HYDROmorphone injection 0.2 mg    ibuprofen tablet 400 mg    ondansetron injection 4 mg    oxyCODONE immediate release tablet 5 mg    polyethylene glycol packet 17 g     Objective:     Vital Signs (Most Recent):  Temp: 97 °F (36.1 °C) (09/06/24 1609)  Pulse: 89 (09/06/24 1609)  Resp: 19 (09/06/24 1609)  BP: 109/61 (09/06/24 1609)  SpO2: 99 % (09/06/24 1609) Vital Signs (24h Range):  Temp:  [97 °F (36.1 °C)-99.3 °F (37.4 °C)] 97 °F (36.1 °C)  Pulse:  [] 89  Resp:  [16-21] 19  SpO2:  [96 %-100 %] 99 %  BP: (109-126)/(57-75) 109/61     Weight: 70.3 kg (154 lb 15.7 oz)  Height: 5' 3" (160 cm)  Body mass index is 27.45 kg/m².      Intake/Output Summary (Last 24 hours) at 9/6/2024 1650  Last data filed at 9/6/2024 1236  Gross per 24 hour   Intake 450 ml   Output 1550 ml   Net -1100 ml        Ortho/SPM Exam  AAOx4  NAD  Reg rate  No increased WOB    RLE:  Dressing c/d/i, knee immobilizer in place   Penrose drain with viscous, cloudy-yellow drainage   SILT T/SP/DP/Loo/Sa  Motor intact T/SP/DP  WWP extremities  FCDs in place and functioning      Significant Labs: All pertinent labs within the past 24 hours have been reviewed.    Significant Imaging: I have reviewed all pertinent imaging results/findings.  " 4-8